# Patient Record
Sex: FEMALE | Race: WHITE | NOT HISPANIC OR LATINO | Employment: OTHER | URBAN - METROPOLITAN AREA
[De-identification: names, ages, dates, MRNs, and addresses within clinical notes are randomized per-mention and may not be internally consistent; named-entity substitution may affect disease eponyms.]

---

## 2017-12-28 ENCOUNTER — TRANSCRIBE ORDERS (OUTPATIENT)
Dept: LAB | Facility: HOSPITAL | Age: 77
End: 2017-12-28

## 2017-12-28 ENCOUNTER — LAB (OUTPATIENT)
Dept: LAB | Facility: HOSPITAL | Age: 77
End: 2017-12-28

## 2017-12-28 DIAGNOSIS — Z01.812 PRE-OPERATIVE LABORATORY EXAMINATION: Primary | ICD-10-CM

## 2017-12-28 DIAGNOSIS — Z01.812 PRE-OPERATIVE LABORATORY EXAMINATION: ICD-10-CM

## 2017-12-28 LAB
ANION GAP SERPL CALCULATED.3IONS-SCNC: 9 MMOL/L (ref 3–11)
BUN BLD-MCNC: 20 MG/DL (ref 9–23)
BUN/CREAT SERPL: 28.6 (ref 7–25)
CALCIUM SPEC-SCNC: 9 MG/DL (ref 8.7–10.4)
CHLORIDE SERPL-SCNC: 109 MMOL/L (ref 99–109)
CO2 SERPL-SCNC: 23 MMOL/L (ref 20–31)
CREAT BLD-MCNC: 0.7 MG/DL (ref 0.6–1.3)
DEPRECATED RDW RBC AUTO: 44.9 FL (ref 37–54)
ERYTHROCYTE [DISTWIDTH] IN BLOOD BY AUTOMATED COUNT: 13.3 % (ref 11.3–14.5)
GFR SERPL CREATININE-BSD FRML MDRD: 81 ML/MIN/1.73
GLUCOSE BLD-MCNC: 121 MG/DL (ref 70–100)
HCT VFR BLD AUTO: 42.3 % (ref 34.5–44)
HGB BLD-MCNC: 14 G/DL (ref 11.5–15.5)
MCH RBC QN AUTO: 30.6 PG (ref 27–31)
MCHC RBC AUTO-ENTMCNC: 33.1 G/DL (ref 32–36)
MCV RBC AUTO: 92.6 FL (ref 80–99)
PLATELET # BLD AUTO: 370 10*3/MM3 (ref 150–450)
PMV BLD AUTO: 10.8 FL (ref 6–12)
POTASSIUM BLD-SCNC: 4 MMOL/L (ref 3.5–5.5)
RBC # BLD AUTO: 4.57 10*6/MM3 (ref 3.89–5.14)
SODIUM BLD-SCNC: 141 MMOL/L (ref 132–146)
WBC NRBC COR # BLD: 8.77 10*3/MM3 (ref 3.5–10.8)

## 2017-12-28 PROCEDURE — 80048 BASIC METABOLIC PNL TOTAL CA: CPT

## 2017-12-28 PROCEDURE — 36415 COLL VENOUS BLD VENIPUNCTURE: CPT | Performed by: SURGERY

## 2017-12-28 PROCEDURE — 85027 COMPLETE CBC AUTOMATED: CPT | Performed by: SURGERY

## 2018-09-06 ENCOUNTER — TRANSCRIBE ORDERS (OUTPATIENT)
Dept: ADMINISTRATIVE | Facility: HOSPITAL | Age: 78
End: 2018-09-06

## 2018-09-06 DIAGNOSIS — Z12.31 VISIT FOR SCREENING MAMMOGRAM: Primary | ICD-10-CM

## 2018-10-11 ENCOUNTER — HOSPITAL ENCOUNTER (OUTPATIENT)
Dept: MAMMOGRAPHY | Facility: HOSPITAL | Age: 78
Discharge: HOME | End: 2018-10-11
Attending: INTERNAL MEDICINE | Admitting: INTERNAL MEDICINE

## 2018-10-11 DIAGNOSIS — Z12.31 VISIT FOR SCREENING MAMMOGRAM: ICD-10-CM

## 2018-10-11 PROCEDURE — 77067 SCR MAMMO BI INCL CAD: CPT

## 2018-10-11 PROCEDURE — 77063 BREAST TOMOSYNTHESIS BI: CPT

## 2018-10-11 PROCEDURE — 77067 SCR MAMMO BI INCL CAD: CPT | Performed by: RADIOLOGY

## 2018-10-11 PROCEDURE — 77063 BREAST TOMOSYNTHESIS BI: CPT | Performed by: RADIOLOGY

## 2018-10-25 ENCOUNTER — TRANSCRIBE ORDERS (OUTPATIENT)
Dept: ADMINISTRATIVE | Facility: HOSPITAL | Age: 78
End: 2018-10-25

## 2018-10-25 DIAGNOSIS — R29.898 RIGHT ARM WEAKNESS: Primary | ICD-10-CM

## 2018-10-29 ENCOUNTER — HOSPITAL ENCOUNTER (OUTPATIENT)
Dept: CT IMAGING | Facility: HOSPITAL | Age: 78
Discharge: HOME | End: 2018-10-29
Attending: INTERNAL MEDICINE | Admitting: INTERNAL MEDICINE

## 2018-10-29 DIAGNOSIS — R29.898 RIGHT ARM WEAKNESS: ICD-10-CM

## 2018-10-29 PROCEDURE — 72125 CT NECK SPINE W/O DYE: CPT

## 2018-11-28 RX ORDER — SULFAMETHOXAZOLE AND TRIMETHOPRIM 800; 160 MG/1; MG/1
TABLET ORAL EVERY 12 HOURS
COMMUNITY
Start: 2018-10-24 | End: 2018-11-30

## 2018-11-28 RX ORDER — MULTIVIT-MIN/IRON/FOLIC ACID/K 18-600-40
2000 CAPSULE ORAL DAILY
COMMUNITY
Start: 2009-11-11 | End: 2021-09-27

## 2018-11-28 RX ORDER — METRONIDAZOLE 7.5 MG/G
LOTION TOPICAL EVERY 12 HOURS SCHEDULED
COMMUNITY
Start: 2017-05-18 | End: 2018-11-30

## 2018-11-28 RX ORDER — SENNOSIDES 8.6 MG
650 CAPSULE ORAL EVERY 12 HOURS PRN
COMMUNITY
Start: 2016-08-29

## 2018-11-28 RX ORDER — HYDROCHLOROTHIAZIDE 12.5 MG/1
12.5 TABLET ORAL DAILY
COMMUNITY
Start: 2018-05-14 | End: 2019-05-13 | Stop reason: SDUPTHER

## 2018-11-28 RX ORDER — MULTIVIT-MIN/IRON/FOLIC/HRB186 3.3 MG-25
1 TABLET ORAL 2 TIMES DAILY
COMMUNITY
Start: 2015-05-13 | End: 2019-11-01

## 2018-11-28 RX ORDER — LOSARTAN POTASSIUM 25 MG/1
25 TABLET ORAL DAILY
COMMUNITY
Start: 2018-05-14 | End: 2019-05-13 | Stop reason: SDUPTHER

## 2018-11-30 ENCOUNTER — OFFICE VISIT (OUTPATIENT)
Dept: NEUROSURGERY | Facility: CLINIC | Age: 78
End: 2018-11-30

## 2018-11-30 VITALS — BODY MASS INDEX: 31.36 KG/M2 | TEMPERATURE: 98.5 F | WEIGHT: 177 LBS | HEIGHT: 63 IN

## 2018-11-30 DIAGNOSIS — G56.03 BILATERAL CARPAL TUNNEL SYNDROME: Primary | ICD-10-CM

## 2018-11-30 DIAGNOSIS — M47.812 CERVICAL SPONDYLOSIS WITHOUT MYELOPATHY: ICD-10-CM

## 2018-11-30 DIAGNOSIS — M25.511 PAIN IN JOINT OF RIGHT SHOULDER: ICD-10-CM

## 2018-11-30 PROCEDURE — 99203 OFFICE O/P NEW LOW 30 MIN: CPT | Performed by: NEUROLOGICAL SURGERY

## 2018-11-30 NOTE — PROGRESS NOTES
Patient: Heidi Youssef  : 1940    Primary Care Provider: Kyle Piedra MD    Requesting Provider: As above        History    Chief Complaint: Bilateral hand numbness.    History of Present Illness: Ms. Youssef is a 78-year-old retired woman who for 6 months has had progressive numbness involving her hands.  In  she underwent right carpal tunnel release by Dr. Hough.  Even after surgery she continued to have some numbness in her fingers but that seems to have increased.  She is also now having symptoms on the left side.  She is right-handed.  Her symptoms bother her more at night.  She's also had some biceps region pain on the right that has settled down since she's backed off of some physical activities at home.  In the past she's had right rotator cuff surgery.  She has some low-grade neck pain.  She describes no radicular symptoms.    Review of Systems   Constitutional: Negative for activity change, appetite change, chills, diaphoresis, fatigue, fever and unexpected weight change.   HENT: Negative for congestion, dental problem, drooling, ear discharge, ear pain, facial swelling, hearing loss, mouth sores, nosebleeds, postnasal drip, rhinorrhea, sinus pressure, sneezing, sore throat, tinnitus, trouble swallowing and voice change.    Eyes: Negative for photophobia, pain, discharge, redness, itching and visual disturbance.   Respiratory: Negative for apnea, cough, choking, chest tightness, shortness of breath, wheezing and stridor.    Cardiovascular: Negative for chest pain, palpitations and leg swelling.   Gastrointestinal: Negative for abdominal distention, abdominal pain, anal bleeding, blood in stool, constipation, diarrhea, nausea, rectal pain and vomiting.   Musculoskeletal: Positive for neck pain. Negative for arthralgias, back pain, gait problem, joint swelling, myalgias and neck stiffness.   Skin: Negative for color change, pallor, rash and wound.   Allergic/Immunologic: Negative for  "environmental allergies, food allergies and immunocompromised state.   Neurological: Positive for weakness and numbness. Negative for dizziness, tremors, seizures, syncope, facial asymmetry, speech difficulty, light-headedness and headaches.   Hematological: Negative for adenopathy. Does not bruise/bleed easily.   Psychiatric/Behavioral: Positive for sleep disturbance. Negative for agitation, behavioral problems, confusion, decreased concentration, dysphoric mood, self-injury and suicidal ideas. The patient is not nervous/anxious and is not hyperactive.        The patient's past medical history, past surgical history, family history, and social history have been reviewed at length in the electronic medical record.    Physical Exam:   Temp 98.5 °F (36.9 °C)   Ht 160 cm (63\")   Wt 80.3 kg (177 lb)   BMI 31.35 kg/m²   CONSTITUTIONAL: Patient is well-nourished, pleasant and appears stated age.  CV: Heart regular rate and rhythm without murmur, rub, or gallop.  PULMONARY: Lungs are clear to ascultation.  MUSCULOSKELETAL:  Neck tenderness to palpation is not observed.   ROM in neck is normal.  Ranging the right shoulder induces discomfort.  She is uncomfortable to palpation in the right bicep region.  Tinel sign is positive bilaterally.  NEUROLOGICAL:  Orientation, memory, attention span, language function, and cognition have been examined and are intact.  Strength is intact in the upper and lower extremities to direct testing.  Muscle tone is normal throughout.  Station and gait are normal.  Sensation is intact to light touch testing throughout.  Deep tendon reflexes are 1+ and symmetrical.  Yareli's Sign is negative bilaterally. No clonus is elicited.  Coordination is intact.      Medical Decision Making    Data Review:   CT of the cervical spine demonstrates some mild diffuse spondylosis.  Electrodiagnostic studies demonstrate moderate median neuropathy at the wrists, worse on the left    Diagnosis:   1.  " Bilateral carpal tunnel syndrome, worse on the left, recurrent on the right.  2.  Cervical spondylosis with mild mechanical neck pain.    Treatment Options:   I have prescribed wrist splints that I like her to wear at night.  If this is not helpful then I would probably consider staged carpal tunnel release beginning on the left.  Once again she's already had intervention done at the right wrist.  She is leaving for [place] very soon and will be back until April.  I will be available.       Diagnosis Plan   1. Bilateral carpal tunnel syndrome   Wrist Hand Orthosis, Wrist Extension Control Cock-up   2. Cervical spondylosis without myelopathy     3. Pain in joint of right shoulder             I, Dr. Wu, personally performed the services described in the documentation, as scribed in my presence, and it is both accurate and complete.  Scribed for Woo Wu MD by Carter Villafana CMA on 11/30/2018 at 2:51 PM

## 2019-05-01 ENCOUNTER — LAB (OUTPATIENT)
Dept: LAB | Facility: HOSPITAL | Age: 79
End: 2019-05-01

## 2019-05-01 ENCOUNTER — OFFICE VISIT (OUTPATIENT)
Dept: INTERNAL MEDICINE | Facility: CLINIC | Age: 79
End: 2019-05-01

## 2019-05-01 VITALS
DIASTOLIC BLOOD PRESSURE: 78 MMHG | WEIGHT: 169 LBS | HEART RATE: 72 BPM | SYSTOLIC BLOOD PRESSURE: 132 MMHG | BODY MASS INDEX: 29.94 KG/M2 | TEMPERATURE: 97.4 F

## 2019-05-01 DIAGNOSIS — R19.5 DARK STOOLS: ICD-10-CM

## 2019-05-01 DIAGNOSIS — R82.90 ABNORMAL FINDING IN URINE: Primary | ICD-10-CM

## 2019-05-01 LAB
BASOPHILS # BLD AUTO: 0.09 10*3/MM3 (ref 0–0.2)
BASOPHILS NFR BLD AUTO: 1.2 % (ref 0–1.5)
BILIRUB BLD-MCNC: NEGATIVE MG/DL
CLARITY, POC: CLEAR
COLOR UR: YELLOW
DEPRECATED RDW RBC AUTO: 43.7 FL (ref 37–54)
EOSINOPHIL # BLD AUTO: 0.07 10*3/MM3 (ref 0–0.4)
EOSINOPHIL NFR BLD AUTO: 0.9 % (ref 0.3–6.2)
ERYTHROCYTE [DISTWIDTH] IN BLOOD BY AUTOMATED COUNT: 12.6 % (ref 12.3–15.4)
GLUCOSE UR STRIP-MCNC: NEGATIVE MG/DL
HCT VFR BLD AUTO: 43 % (ref 34–46.6)
HGB BLD-MCNC: 13.8 G/DL (ref 12–15.9)
IMM GRANULOCYTES # BLD AUTO: 0.03 10*3/MM3 (ref 0–0.05)
IMM GRANULOCYTES NFR BLD AUTO: 0.4 % (ref 0–0.5)
KETONES UR QL: NEGATIVE
LEUKOCYTE EST, POC: NEGATIVE
LYMPHOCYTES # BLD AUTO: 3.03 10*3/MM3 (ref 0.7–3.1)
LYMPHOCYTES NFR BLD AUTO: 40 % (ref 19.6–45.3)
MCH RBC QN AUTO: 30.3 PG (ref 26.6–33)
MCHC RBC AUTO-ENTMCNC: 32.1 G/DL (ref 31.5–35.7)
MCV RBC AUTO: 94.3 FL (ref 79–97)
MONOCYTES # BLD AUTO: 0.59 10*3/MM3 (ref 0.1–0.9)
MONOCYTES NFR BLD AUTO: 7.8 % (ref 5–12)
NEUTROPHILS # BLD AUTO: 3.76 10*3/MM3 (ref 1.7–7)
NEUTROPHILS NFR BLD AUTO: 49.7 % (ref 42.7–76)
NITRITE UR-MCNC: NEGATIVE MG/ML
NRBC BLD AUTO-RTO: 0.1 /100 WBC (ref 0–0.2)
PH UR: 5.5 [PH] (ref 5–8)
PLATELET # BLD AUTO: 446 10*3/MM3 (ref 140–450)
PMV BLD AUTO: 10 FL (ref 6–12)
PROT UR STRIP-MCNC: NEGATIVE MG/DL
RBC # BLD AUTO: 4.56 10*6/MM3 (ref 3.77–5.28)
RBC # UR STRIP: NEGATIVE /UL
SP GR UR: 1.02 (ref 1–1.03)
UROBILINOGEN UR QL: NORMAL
WBC NRBC COR # BLD: 7.57 10*3/MM3 (ref 3.4–10.8)

## 2019-05-01 PROCEDURE — 85025 COMPLETE CBC W/AUTO DIFF WBC: CPT

## 2019-05-01 PROCEDURE — 81003 URINALYSIS AUTO W/O SCOPE: CPT | Performed by: PHYSICIAN ASSISTANT

## 2019-05-01 PROCEDURE — 99214 OFFICE O/P EST MOD 30 MIN: CPT | Performed by: PHYSICIAN ASSISTANT

## 2019-05-01 PROCEDURE — 36415 COLL VENOUS BLD VENIPUNCTURE: CPT

## 2019-05-01 NOTE — PATIENT INSTRUCTIONS
Increase water consumption.  Call if dark urine persists.    CBC and FOBT ordered today.  She is to call if the dark stool persist.

## 2019-05-01 NOTE — PROGRESS NOTES
Patient Care Team:  Kyle Piedra MD as PCP - General  Kyle Piedra MD as PCP - Family Medicine  Kyle Piedra MD as Referring Physician (Internal Medicine)    Chief Complaint;:   Chief Complaint   Patient presents with   • Urinary Tract Infection     dark urine, odor x 1 week         Subjective     HPI  78-year-old white female presents to the office today complaining of odorous urine and dark color.  She does drink a lot of water.  She has had no frequency or dysuria.  UA in the office today was normal.  She denies any new vitamins or supplements.    She did mention over the last several weeks she has had a couple of episodes of dark stool.  No epigastric pain.  She will occasionally have bright red blood due to her hemorrhoids.  Past Medical History:   Diagnosis Date   • Arthritis    • Breast injury    • Hyperlipidemia    • Hypertension        Social History     Socioeconomic History   • Marital status:      Spouse name: Not on file   • Number of children: Not on file   • Years of education: Not on file   • Highest education level: Not on file   Tobacco Use   • Smoking status: Never Smoker   • Smokeless tobacco: Never Used   Substance and Sexual Activity   • Alcohol use: Yes     Comment: occasional   • Drug use: No       Allergies   Allergen Reactions   • Bactrim [Sulfamethoxazole-Trimethoprim] Other (See Comments)     Severe joint pain   • Aspirin Palpitations   • Nsaids Palpitations       Review of Systems:     Review of Systems   Constitutional: Negative.    Gastrointestinal: Positive for blood in stool. Negative for abdominal pain, constipation, diarrhea, nausea, rectal pain, vomiting, GERD and indigestion.   Genitourinary: Negative for dysuria, flank pain, frequency and urgency.       Vital Signs  Vitals:    05/01/19 1530   BP: 132/78   BP Location: Left arm   Patient Position: Sitting   Cuff Size: Adult   Pulse: 72   Temp: 97.4 °F (36.3 °C)   TempSrc: Temporal   Weight:  76.7 kg (169 lb)   PainSc: 0-No pain         Current Outpatient Medications:   •  acetaminophen (TYLENOL) 650 MG 8 hr tablet, Take 650 mg by mouth Every 12 (Twelve) Hours As Needed for Moderate Pain ., Disp: , Rfl:   •  Cholecalciferol (VITAMIN D) 2000 units capsule, Take 2,000 Units by mouth Daily., Disp: , Rfl:   •  Glucosamine-Chondroitin 250-200 MG capsule, Take 1 capsule by mouth 2 (Two) Times a Day., Disp: , Rfl:   •  hydrochlorothiazide (HYDRODIURIL) 12.5 MG tablet, Take 12.5 mg by mouth Daily., Disp: , Rfl:   •  losartan (COZAAR) 25 MG tablet, Take 25 mg by mouth Daily., Disp: , Rfl:     Physical Exam:    Physical Exam   Constitutional: She is oriented to person, place, and time. She appears well-developed and well-nourished.   Neck: Normal range of motion. Neck supple.   Cardiovascular: Normal rate, regular rhythm and normal heart sounds.   Pulmonary/Chest: Effort normal and breath sounds normal.   Abdominal: Soft. Bowel sounds are normal. She exhibits no distension and no mass. There is no tenderness. There is no guarding.   Neurological: She is alert and oriented to person, place, and time.   Psychiatric: She has a normal mood and affect. Her behavior is normal. Judgment and thought content normal.   Nursing note and vitals reviewed.      Procedures      Assessment/Plan   Problem List Items Addressed This Visit     None      Visit Diagnoses     Abnormal finding in urine    -  Primary    She was encouraged to drink more water    Relevant Orders    POC Urinalysis Dipstick, Automated (Completed)    Dark stools        Relevant Orders    CBC w AUTO Differential    POCT Occult blood x 3, stool        Patient Instructions   Increase water consumption.  Call if dark urine persists.    CBC and FOBT ordered today.  She is to call if the dark stool persist.      Plan of care reviewed with patient at the conclusion of today's visit. Education was provided regarding diagnosis, management, and any prescribed or  recommended OTC medications.Patient verbalizes understanding of and agreement with management plan.     Bel Ferguson PA-C

## 2019-05-03 ENCOUNTER — LAB (OUTPATIENT)
Dept: LAB | Facility: HOSPITAL | Age: 79
End: 2019-05-03

## 2019-05-03 DIAGNOSIS — R19.5 DARK STOOLS: ICD-10-CM

## 2019-05-03 LAB — HEMOCCULT STL QL: NEGATIVE

## 2019-05-03 PROCEDURE — 82270 OCCULT BLOOD FECES: CPT

## 2019-05-13 RX ORDER — LOSARTAN POTASSIUM 25 MG/1
25 TABLET ORAL DAILY
Qty: 30 TABLET | Refills: 11 | Status: SHIPPED | OUTPATIENT
Start: 2019-05-13 | End: 2020-05-11

## 2019-05-13 RX ORDER — HYDROCHLOROTHIAZIDE 12.5 MG/1
TABLET ORAL
Qty: 15 TABLET | Refills: 11 | Status: SHIPPED | OUTPATIENT
Start: 2019-05-13 | End: 2020-05-11

## 2019-05-22 PROBLEM — L72.3 SEBACEOUS CYST: Status: ACTIVE | Noted: 2019-05-22

## 2019-05-22 PROBLEM — Z78.9 ALCOHOL USE: Status: ACTIVE | Noted: 2019-05-22

## 2019-05-22 PROBLEM — E78.2 MIXED HYPERLIPIDEMIA: Status: ACTIVE | Noted: 2019-05-22

## 2019-05-22 PROBLEM — M19.90 OSTEOARTHRITIS: Status: ACTIVE | Noted: 2019-05-22

## 2019-05-22 PROBLEM — R20.2 PARESTHESIAS IN LEFT HAND: Status: ACTIVE | Noted: 2019-05-22

## 2019-05-22 PROBLEM — R20.2 PARESTHESIAS IN RIGHT HAND: Status: ACTIVE | Noted: 2019-05-22

## 2019-05-22 PROBLEM — N39.0 UTI (URINARY TRACT INFECTION): Status: ACTIVE | Noted: 2019-05-22

## 2019-05-22 PROBLEM — I10 BENIGN ESSENTIAL HYPERTENSION: Status: ACTIVE | Noted: 2019-05-22

## 2019-05-22 PROBLEM — F10.90 ALCOHOL USE: Status: ACTIVE | Noted: 2019-05-22

## 2019-05-24 ENCOUNTER — OFFICE VISIT (OUTPATIENT)
Dept: INTERNAL MEDICINE | Facility: CLINIC | Age: 79
End: 2019-05-24

## 2019-05-24 ENCOUNTER — LAB (OUTPATIENT)
Dept: LAB | Facility: HOSPITAL | Age: 79
End: 2019-05-24

## 2019-05-24 VITALS
SYSTOLIC BLOOD PRESSURE: 118 MMHG | BODY MASS INDEX: 30.12 KG/M2 | HEIGHT: 63 IN | HEART RATE: 80 BPM | WEIGHT: 170 LBS | TEMPERATURE: 100.1 F | DIASTOLIC BLOOD PRESSURE: 62 MMHG

## 2019-05-24 DIAGNOSIS — N39.0 ACUTE UTI: ICD-10-CM

## 2019-05-24 DIAGNOSIS — R82.998 DARK URINE: Primary | ICD-10-CM

## 2019-05-24 LAB
BILIRUB BLD-MCNC: ABNORMAL MG/DL
CLARITY, POC: ABNORMAL
COLOR UR: ABNORMAL
GLUCOSE UR STRIP-MCNC: NEGATIVE MG/DL
KETONES UR QL: ABNORMAL
LEUKOCYTE EST, POC: ABNORMAL
NITRITE UR-MCNC: POSITIVE MG/ML
PH UR: 5.5 [PH] (ref 5–8)
PROT UR STRIP-MCNC: ABNORMAL MG/DL
RBC # UR STRIP: ABNORMAL /UL
SP GR UR: 1.02 (ref 1–1.03)
UROBILINOGEN UR QL: NORMAL

## 2019-05-24 PROCEDURE — 87186 SC STD MICRODIL/AGAR DIL: CPT

## 2019-05-24 PROCEDURE — 87086 URINE CULTURE/COLONY COUNT: CPT

## 2019-05-24 PROCEDURE — 99213 OFFICE O/P EST LOW 20 MIN: CPT | Performed by: NURSE PRACTITIONER

## 2019-05-24 PROCEDURE — 81003 URINALYSIS AUTO W/O SCOPE: CPT | Performed by: NURSE PRACTITIONER

## 2019-05-24 RX ORDER — CIPROFLOXACIN 500 MG/1
500 TABLET, FILM COATED ORAL 2 TIMES DAILY
Qty: 10 TABLET | Refills: 0 | Status: SHIPPED | OUTPATIENT
Start: 2019-05-24 | End: 2019-09-09

## 2019-05-24 RX ORDER — ONDANSETRON 4 MG/1
4 TABLET, FILM COATED ORAL EVERY 8 HOURS PRN
Qty: 6 TABLET | Refills: 0 | Status: SHIPPED | OUTPATIENT
Start: 2019-05-24 | End: 2019-09-30

## 2019-05-24 NOTE — PROGRESS NOTES
Heidi Youssef  1940  [id number]  Patient Care Team:  Kyle Piedra MD as PCP - General  Kyle Piedra MD as PCP - Family Medicine  Kyle Piedra MD as Referring Physician (Internal Medicine)    Heidi Youssef is a pleasant 78 y.o. female who presents for evaluation of Chills and Nausea    Chief Complaint   Patient presents with   • Chills   • Nausea       HPI:   5 days week history of low back pain, chills, nausea, urinary symptoms.  Was here on May 1 and had negative UA.  She has been traveling.  No abdominal pain.  Dark urine, no hematuria.  Drinking extra water.  Pain developed while on 7-hour flight home from [place] Monday.  That has already resolved.    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       Past Surgical History:   Procedure Laterality Date   • CARPAL TUNNEL RELEASE  12/16/2015    Right   • JOINT REPLACEMENT      rt knee partial replacement   • REPLACEMENT TOTAL KNEE  2014   • TONSILLECTOMY  1952   • TUBAL ABDOMINAL LIGATION Bilateral 1975       Family History   Problem Relation Age of Onset   • Breast cancer Maternal Aunt 45   • Arthritis Mother         OA   • Hypertension Mother    • Diabetes Mother    • Coronary artery disease Mother    • Coronary artery disease Father    • Diabetes Maternal Uncle        Social History     Tobacco Use   Smoking Status Never Smoker   Smokeless Tobacco Never Used       Allergies   Allergen Reactions   • Bactrim [Sulfamethoxazole-Trimethoprim] Other (See Comments)     Severe joint pain   • Aspirin Palpitations   • Nsaids Palpitations       Review of Systems   Constitutional: Positive for chills, fatigue and fever.   HENT: Negative for congestion, ear pain and sinus pressure.    Respiratory: Negative for cough, chest tightness, shortness of breath and wheezing.    Cardiovascular: Negative for chest pain and  "palpitations.   Gastrointestinal: Negative for abdominal pain, blood in stool and constipation.   Musculoskeletal: Positive for back pain.   Skin: Negative for color change.   Allergic/Immunologic: Negative for environmental allergies.   Neurological: Negative for dizziness, speech difficulty and headache.   Psychiatric/Behavioral: Negative for decreased concentration. The patient is not nervous/anxious.        Vitals:    05/24/19 1309   BP: 118/62   BP Location: Left arm   Patient Position: Sitting   Cuff Size: Adult   Pulse: 80   Temp: 100.1 °F (37.8 °C)   TempSrc: Temporal   Weight: 77.1 kg (170 lb)   Height: 160 cm (63\")         Current Outpatient Medications:   •  acetaminophen (TYLENOL) 650 MG 8 hr tablet, Take 650 mg by mouth Every 12 (Twelve) Hours As Needed for Moderate Pain ., Disp: , Rfl:   •  Cholecalciferol (VITAMIN D) 2000 units capsule, Take 2,000 Units by mouth Daily., Disp: , Rfl:   •  Glucosamine-Chondroitin 250-200 MG capsule, Take 1 capsule by mouth 2 (Two) Times a Day., Disp: , Rfl:   •  hydrochlorothiazide (HYDRODIURIL) 12.5 MG tablet, TAKE 1/2 TABLET QD, Disp: 15 tablet, Rfl: 11  •  losartan (COZAAR) 25 MG tablet, Take 1 tablet by mouth Daily., Disp: 30 tablet, Rfl: 11  •  ciprofloxacin (CIPRO) 500 MG tablet, Take 1 tablet by mouth 2 (Two) Times a Day., Disp: 10 tablet, Rfl: 0  •  ondansetron (ZOFRAN) 4 MG tablet, Take 1 tablet by mouth Every 8 (Eight) Hours As Needed for Nausea or Vomiting., Disp: 6 tablet, Rfl: 0    Physical Exam   Constitutional: She appears well-developed and well-nourished.   HENT:   Head: Normocephalic and atraumatic.   Right Ear: External ear normal.   Left Ear: External ear normal.   Mouth/Throat: Oropharynx is clear and moist.   Eyes: Conjunctivae and EOM are normal.   Neck: Normal range of motion. Neck supple.   Cardiovascular: Normal rate, regular rhythm and normal heart sounds.   Pulmonary/Chest: Effort normal and breath sounds normal.   Abdominal: Soft. Bowel " sounds are normal. There is tenderness in the suprapubic area.   Musculoskeletal: Normal range of motion.   Lymphadenopathy:     She has no cervical adenopathy.   Neurological: She is alert.   Skin: Skin is warm and dry.   Psychiatric: She has a normal mood and affect. Her behavior is normal. Thought content normal.       Results Review:    I reviewed the patient's new clinical results.    Assessment/Plan:    Problem List Items Addressed This Visit     None      Visit Diagnoses     Dark urine    -  Primary    Relevant Orders    POC Urinalysis Dipstick, Automated (Completed)    Acute UTI        Relevant Medications    ciprofloxacin (CIPRO) 500 MG tablet    Other Relevant Orders    Urine Culture - Urine, Urine, Clean Catch          Plan of care reviewed with patient at the conclusion of today's visit. Education was provided regarding diagnosis, management and any prescribed or recommended OTC medications.  Patient verbalizes understanding of and agreement with management plan.    Return if symptoms worsen or fail to improve.    *Note that portions of this note were completed with a voice recognition program.  Efforts were made to edit the dictation but occasionally words are transcribed.    DELORES Andersen

## 2019-05-26 LAB — BACTERIA SPEC AEROBE CULT: ABNORMAL

## 2019-05-27 ENCOUNTER — TELEPHONE (OUTPATIENT)
Dept: INTERNAL MEDICINE | Facility: CLINIC | Age: 79
End: 2019-05-27

## 2019-08-26 PROBLEM — E66.9 OBESITY: Status: ACTIVE | Noted: 2019-08-26

## 2019-08-26 PROBLEM — Z00.00 ANNUAL PHYSICAL EXAM: Status: ACTIVE | Noted: 2019-08-26

## 2019-09-09 ENCOUNTER — LAB (OUTPATIENT)
Dept: LAB | Facility: HOSPITAL | Age: 79
End: 2019-09-09

## 2019-09-09 ENCOUNTER — OFFICE VISIT (OUTPATIENT)
Dept: INTERNAL MEDICINE | Facility: CLINIC | Age: 79
End: 2019-09-09

## 2019-09-09 VITALS
HEART RATE: 72 BPM | BODY MASS INDEX: 30.48 KG/M2 | HEIGHT: 63 IN | SYSTOLIC BLOOD PRESSURE: 144 MMHG | DIASTOLIC BLOOD PRESSURE: 78 MMHG | WEIGHT: 172 LBS

## 2019-09-09 DIAGNOSIS — Z00.00 WELL ADULT EXAM: Primary | ICD-10-CM

## 2019-09-09 DIAGNOSIS — Z00.00 WELL ADULT EXAM: ICD-10-CM

## 2019-09-09 DIAGNOSIS — E78.2 MIXED HYPERLIPIDEMIA: ICD-10-CM

## 2019-09-09 DIAGNOSIS — I10 BENIGN ESSENTIAL HYPERTENSION: ICD-10-CM

## 2019-09-09 LAB
ALBUMIN SERPL-MCNC: 4.3 G/DL (ref 3.5–5.2)
ALBUMIN UR-MCNC: 4 MG/DL
ALBUMIN/GLOB SERPL: 1.4 G/DL
ALP SERPL-CCNC: 66 U/L (ref 39–117)
ALT SERPL W P-5'-P-CCNC: 15 U/L (ref 1–33)
ANION GAP SERPL CALCULATED.3IONS-SCNC: 11.5 MMOL/L (ref 5–15)
AST SERPL-CCNC: 16 U/L (ref 1–32)
BASOPHILS # BLD AUTO: 0.08 10*3/MM3 (ref 0–0.2)
BASOPHILS NFR BLD AUTO: 1.3 % (ref 0–1.5)
BILIRUB SERPL-MCNC: 0.4 MG/DL (ref 0.2–1.2)
BUN BLD-MCNC: 22 MG/DL (ref 8–23)
BUN/CREAT SERPL: 31.9 (ref 7–25)
CALCIUM SPEC-SCNC: 9.3 MG/DL (ref 8.6–10.5)
CHLORIDE SERPL-SCNC: 108 MMOL/L (ref 98–107)
CHOLEST SERPL-MCNC: 175 MG/DL (ref 0–200)
CO2 SERPL-SCNC: 20.5 MMOL/L (ref 22–29)
CREAT BLD-MCNC: 0.69 MG/DL (ref 0.57–1)
DEPRECATED RDW RBC AUTO: 45.1 FL (ref 37–54)
EOSINOPHIL # BLD AUTO: 0.13 10*3/MM3 (ref 0–0.4)
EOSINOPHIL NFR BLD AUTO: 2.1 % (ref 0.3–6.2)
ERYTHROCYTE [DISTWIDTH] IN BLOOD BY AUTOMATED COUNT: 13.2 % (ref 12.3–15.4)
GFR SERPL CREATININE-BSD FRML MDRD: 82 ML/MIN/1.73
GLOBULIN UR ELPH-MCNC: 3 GM/DL
GLUCOSE BLD-MCNC: 94 MG/DL (ref 65–99)
HCT VFR BLD AUTO: 43.1 % (ref 34–46.6)
HDLC SERPL-MCNC: 38 MG/DL (ref 40–60)
HGB BLD-MCNC: 13.9 G/DL (ref 12–15.9)
IMM GRANULOCYTES # BLD AUTO: 0.01 10*3/MM3 (ref 0–0.05)
IMM GRANULOCYTES NFR BLD AUTO: 0.2 % (ref 0–0.5)
LDLC SERPL CALC-MCNC: 111 MG/DL (ref 0–100)
LDLC/HDLC SERPL: 2.93 {RATIO}
LYMPHOCYTES # BLD AUTO: 2.39 10*3/MM3 (ref 0.7–3.1)
LYMPHOCYTES NFR BLD AUTO: 38.4 % (ref 19.6–45.3)
MCH RBC QN AUTO: 30.3 PG (ref 26.6–33)
MCHC RBC AUTO-ENTMCNC: 32.3 G/DL (ref 31.5–35.7)
MCV RBC AUTO: 94.1 FL (ref 79–97)
MONOCYTES # BLD AUTO: 0.51 10*3/MM3 (ref 0.1–0.9)
MONOCYTES NFR BLD AUTO: 8.2 % (ref 5–12)
NEUTROPHILS # BLD AUTO: 3.11 10*3/MM3 (ref 1.7–7)
NEUTROPHILS NFR BLD AUTO: 49.8 % (ref 42.7–76)
NRBC BLD AUTO-RTO: 0 /100 WBC (ref 0–0.2)
PLATELET # BLD AUTO: 357 10*3/MM3 (ref 140–450)
PMV BLD AUTO: 10.1 FL (ref 6–12)
POTASSIUM BLD-SCNC: 4 MMOL/L (ref 3.5–5.2)
PROT SERPL-MCNC: 7.3 G/DL (ref 6–8.5)
RBC # BLD AUTO: 4.58 10*6/MM3 (ref 3.77–5.28)
SODIUM BLD-SCNC: 140 MMOL/L (ref 136–145)
TRIGL SERPL-MCNC: 129 MG/DL (ref 0–150)
VLDLC SERPL-MCNC: 25.8 MG/DL (ref 5–40)
WBC NRBC COR # BLD: 6.23 10*3/MM3 (ref 3.4–10.8)

## 2019-09-09 PROCEDURE — 80061 LIPID PANEL: CPT

## 2019-09-09 PROCEDURE — 82043 UR ALBUMIN QUANTITATIVE: CPT

## 2019-09-09 PROCEDURE — G0439 PPPS, SUBSEQ VISIT: HCPCS | Performed by: PHYSICIAN ASSISTANT

## 2019-09-09 PROCEDURE — 85025 COMPLETE CBC W/AUTO DIFF WBC: CPT

## 2019-09-09 PROCEDURE — 80053 COMPREHEN METABOLIC PANEL: CPT

## 2019-09-09 NOTE — PATIENT INSTRUCTIONS
Medicare Wellness  Personal Prevention Plan of Service     Date of Office Visit:  2019  Encounter Provider:  Bel Ferguson PA-C  Place of Service:  [facility]  Patient Name: Heidi Youssef  :  1940    As part of the Medicare Wellness portion of your visit today, we are providing you with this personalized preventive plan of services (PPPS). This plan is based upon recommendations of the [place] Preventive Services Task Force (USPSTF) and the Advisory Committee on Immunization Practices (ACIP).    This lists the preventive care services that should be considered, and provides dates of when you are due. Items listed as completed are up-to-date and do not require any further intervention.    Health Maintenance   Topic Date Due   • ZOSTER VACCINE (1 of 2) 1991   • LIPID PANEL  2018   • MEDICARE ANNUAL WELLNESS  2019   • INFLUENZA VACCINE  2019   • TDAP/TD VACCINES (2 - Td) 10/25/2022   • PNEUMOCOCCAL VACCINES (65+ LOW/MEDIUM RISK)  Completed       No orders of the defined types were placed in this encounter.      Return for Next scheduled follow up, labs today.

## 2019-09-09 NOTE — PROGRESS NOTES
Subsequent Medicare Wellness Visit   The ABC's of the Annual Wellness Visit    Chief Complaint   Patient presents with   • Medicare Wellness-subsequent     She is fasting       HPI:  Heidi Youssef, -1940, is a 78 y.o. female who presents for a Subsequent Medicare Wellness Visit.    Recent Hospitalizations:  No hospitalization(s) within the last year..    Current Medical Providers:  Patient Care Team:  Kyle Piedra MD as PCP - General  Kyle Piedra MD as PCP - Family Medicine  Kyle Piedra MD as Referring Physician (Internal Medicine)    Health Habits and Functional and Cognitive Screening and Depression Screening:  Functional & Cognitive Status 2019   Do you have difficulty preparing food and eating? No   Do you have difficulty bathing yourself, getting dressed or grooming yourself? No   Do you have difficulty using the toilet? No   Do you have difficulty moving around from place to place? No   Do you have trouble with steps or getting out of a bed or a chair? No   Current Diet Well Balanced Diet   Dental Exam Up to date   Eye Exam Up to date   Exercise (times per week) 0 times per week   Current Exercise Activities Include None   Do you need help using the phone?  No   Are you deaf or do you have serious difficulty hearing?  No   Do you need help with transportation? No   Do you need help shopping? No   Do you need help preparing meals?  No   Do you need help with housework?  No   Do you need help with laundry? No   Do you need help taking your medications? No   Do you need help managing money? No   Do you ever drive or ride in a car without wearing a seat belt? No   Have you felt unusual stress, anger or loneliness in the last month? Yes   Who do you live with? Alone   If you need help, do you have trouble finding someone available to you? No   Have you been bothered in the last four weeks by sexual problems? No   Do you have difficulty concentrating, remembering or  making decisions? No       Compared to one year ago, the patient feels her physical health is the same and her mental health is the same.    Depression Screen:  PHQ-2/PHQ-9 Depression Screening 2019   Little interest or pleasure in doing things 0   Feeling down, depressed, or hopeless 0   Total Score 0       MATTHEW Fall Risk Assessment was completed, and patient is at LOW risk for falls.Assessment completed on:2019    ATTENTION  What is the year: correct  What is the month of the year: correct  What is the day of the week?: correct  What is the date?: correct  MEMORY  Repeat address three times, only score third attempt: [address] [place], [place]: 7  HOW MANY ANIMALS DID THE PATIENT NAME  Verbal Fluency -- Animal Names (0-25): 22+  CLOCK DRAWING  Clock Drawing: All Correct  MEMORY RECALL  Tell me what you remember about that name and address we were repeating at the beginnin  ACE TOTAL SCORE  Total ACE Score - <25/30 strongly suggests cognitive impairment; <21/30 almost certainly shows dementia: 30    Past Medical/Family/Social History:  The following portions of the patient's history were reviewed and updated as appropriate: allergies, current medications, past family history, past medical history, past social history and past surgical history.    Allergies   Allergen Reactions   • Bactrim [Sulfamethoxazole-Trimethoprim] Other (See Comments)     Severe joint pain   • Aspirin Palpitations   • Nsaids Palpitations         Current Outpatient Medications:   •  acetaminophen (TYLENOL) 650 MG 8 hr tablet, Take 650 mg by mouth Every 12 (Twelve) Hours As Needed for Moderate Pain ., Disp: , Rfl:   •  Cholecalciferol (VITAMIN D) 2000 units capsule, Take 2,000 Units by mouth Daily., Disp: , Rfl:   •  Glucosamine-Chondroitin 250-200 MG capsule, Take 1 capsule by mouth 2 (Two) Times a Day., Disp: , Rfl:   •  Histamine Dihydrochloride (AUSTRALIAN DREAM ARTHRITIS EX), Apply  topically every  "night at bedtime., Disp: , Rfl:   •  hydrochlorothiazide (HYDRODIURIL) 12.5 MG tablet, TAKE 1/2 TABLET QD, Disp: 15 tablet, Rfl: 11  •  losartan (COZAAR) 25 MG tablet, Take 1 tablet by mouth Daily., Disp: 30 tablet, Rfl: 11  •  ondansetron (ZOFRAN) 4 MG tablet, Take 1 tablet by mouth Every 8 (Eight) Hours As Needed for Nausea or Vomiting., Disp: 6 tablet, Rfl: 0    Aspirin use counseling: Does not need ASA (and currently is not on it)    Current medication list contains no high risk medications.  No harmful drug interactions have been identified.     Family History   Problem Relation Age of Onset   • Breast cancer Maternal Aunt 45   • Arthritis Mother         OA   • Hypertension Mother    • Diabetes Mother    • Coronary artery disease Mother    • Coronary artery disease Father    • Diabetes Maternal Uncle        Social History     Tobacco Use   • Smoking status: Never Smoker   • Smokeless tobacco: Never Used   Substance Use Topics   • Alcohol use: Yes     Frequency: 2-4 times a month     Drinks per session: 1 or 2     Binge frequency: Never     Comment: occasional       Past Surgical History:   Procedure Laterality Date   • CARPAL TUNNEL RELEASE  12/16/2015    Right   • JOINT REPLACEMENT      rt knee partial replacement   • REPLACEMENT TOTAL KNEE  2014   • TONSILLECTOMY  1952   • TUBAL ABDOMINAL LIGATION Bilateral 1975       Patient Active Problem List   Diagnosis   • Alcohol use   • Benign essential hypertension   • Paresthesias in right hand   • Paresthesias in left hand   • UTI (urinary tract infection)   • Mixed hyperlipidemia   • Sebaceous cyst   • Osteoarthritis   • Obesity   • Annual physical exam       Review of Systems    Objective     Vitals:    09/09/19 0958   BP: 144/78   BP Location: Left arm   Patient Position: Sitting   Cuff Size: Adult   Pulse: 72   Weight: 78 kg (172 lb)   Height: 160 cm (63\")   PainSc: 0-No pain       Patient's Body mass index is 30.47 kg/m². BMI is above normal parameters. " Recommendations include: exercise counseling.      No exam data present    The patient has no evidence of cognitve impairment.   ATTENTION  What is the year: correct  What is the month of the year: correct  What is the day of the week?: correct  What is the date?: correct  MEMORY  Repeat address three times, only score third attempt: [address] [place], [place]: 7  HOW MANY ANIMALS DID THE PATIENT NAME  Verbal Fluency -- Animal Names (0-25): 22+  CLOCK DRAWING  Clock Drawing: All Correct  MEMORY RECALL  Tell me what you remember about that name and address we were repeating at the beginnin  ACE TOTAL SCORE  Total ACE Score - <25/30 strongly suggests cognitive impairment; <21/30 almost certainly shows dementia: 30    Physical Exam    Recent Lab Results:          Assessment/Plan   Age-appropriate Screening Schedule:  Refer to the list below for future screening recommendations based on patient's age, sex and/or medical conditions.      Health Maintenance   Topic Date Due   • ZOSTER VACCINE (1 of 2) 1991   • LIPID PANEL  2018   • INFLUENZA VACCINE  2019   • TDAP/TD VACCINES (2 - Td) 10/25/2022   • PNEUMOCOCCAL VACCINES (65+ LOW/MEDIUM RISK)  Completed       Medicare Risks and Personalized Health Plan:  Cardiovascular risk      CMS-Preventive Services Quick Reference  Medicare Preventive Services Addressed:  Annual Wellness Visit (AWV)    Advance Care Planning:  Patient does not have an advance directive - not interested in additional information    Diagnoses and all orders for this visit:    1. Well adult exam (Primary)  Comments:  Fasting for labs.    Normal cognitive assessment ACE score 30/30.  Vaccines up to date.  Orders:  -     CBC & Differential; Future    2. Benign essential hypertension  -     CBC & Differential; Future  -     MicroAlbumin, Urine, Random - Urine, Clean Catch; Future    3. Mixed hyperlipidemia  -     Comprehensive Metabolic Panel; Future  -     Lipid  Panel; Future        An After Visit Summary and PPPS with all of these plans were given to the patient.      Follow Up:  Return for Next scheduled follow up, labs today.

## 2019-09-30 ENCOUNTER — OFFICE VISIT (OUTPATIENT)
Dept: INTERNAL MEDICINE | Facility: CLINIC | Age: 79
End: 2019-09-30

## 2019-09-30 VITALS
HEIGHT: 63 IN | BODY MASS INDEX: 29.77 KG/M2 | SYSTOLIC BLOOD PRESSURE: 142 MMHG | DIASTOLIC BLOOD PRESSURE: 84 MMHG | HEART RATE: 64 BPM | WEIGHT: 168 LBS

## 2019-09-30 DIAGNOSIS — E66.09 CLASS 1 OBESITY DUE TO EXCESS CALORIES WITHOUT SERIOUS COMORBIDITY WITH BODY MASS INDEX (BMI) OF 30.0 TO 30.9 IN ADULT: ICD-10-CM

## 2019-09-30 DIAGNOSIS — G56.03 BILATERAL CARPAL TUNNEL SYNDROME: ICD-10-CM

## 2019-09-30 DIAGNOSIS — M51.36 LUMBAR DEGENERATIVE DISC DISEASE: ICD-10-CM

## 2019-09-30 DIAGNOSIS — E78.2 MIXED HYPERLIPIDEMIA: ICD-10-CM

## 2019-09-30 DIAGNOSIS — I10 BENIGN ESSENTIAL HYPERTENSION: Primary | ICD-10-CM

## 2019-09-30 PROCEDURE — G0008 ADMIN INFLUENZA VIRUS VAC: HCPCS | Performed by: INTERNAL MEDICINE

## 2019-09-30 PROCEDURE — 99214 OFFICE O/P EST MOD 30 MIN: CPT | Performed by: INTERNAL MEDICINE

## 2019-09-30 PROCEDURE — 90653 IIV ADJUVANT VACCINE IM: CPT | Performed by: INTERNAL MEDICINE

## 2020-05-11 RX ORDER — HYDROCHLOROTHIAZIDE 12.5 MG/1
TABLET ORAL
Qty: 15 TABLET | Refills: 4 | Status: SHIPPED | OUTPATIENT
Start: 2020-05-11 | End: 2020-09-28 | Stop reason: SDUPTHER

## 2020-05-11 RX ORDER — LOSARTAN POTASSIUM 25 MG/1
25 TABLET ORAL DAILY
Qty: 30 TABLET | Refills: 4 | Status: SHIPPED | OUTPATIENT
Start: 2020-05-11 | End: 2020-09-28 | Stop reason: SDUPTHER

## 2020-09-10 ENCOUNTER — LAB (OUTPATIENT)
Dept: LAB | Facility: HOSPITAL | Age: 80
End: 2020-09-10

## 2020-09-10 ENCOUNTER — OFFICE VISIT (OUTPATIENT)
Dept: INTERNAL MEDICINE | Facility: CLINIC | Age: 80
End: 2020-09-10

## 2020-09-10 VITALS
BODY MASS INDEX: 31.08 KG/M2 | DIASTOLIC BLOOD PRESSURE: 74 MMHG | WEIGHT: 175.4 LBS | HEART RATE: 72 BPM | SYSTOLIC BLOOD PRESSURE: 138 MMHG | OXYGEN SATURATION: 98 % | HEIGHT: 63 IN | TEMPERATURE: 97.4 F

## 2020-09-10 DIAGNOSIS — Z12.31 BREAST CANCER SCREENING BY MAMMOGRAM: ICD-10-CM

## 2020-09-10 DIAGNOSIS — E55.9 VITAMIN D DEFICIENCY: ICD-10-CM

## 2020-09-10 DIAGNOSIS — I10 BENIGN ESSENTIAL HYPERTENSION: ICD-10-CM

## 2020-09-10 DIAGNOSIS — G89.29 CHRONIC BILATERAL LOW BACK PAIN WITH RIGHT-SIDED SCIATICA: ICD-10-CM

## 2020-09-10 DIAGNOSIS — M54.2 NECK PAIN: ICD-10-CM

## 2020-09-10 DIAGNOSIS — R53.83 FATIGUE, UNSPECIFIED TYPE: ICD-10-CM

## 2020-09-10 DIAGNOSIS — E78.2 MIXED HYPERLIPIDEMIA: ICD-10-CM

## 2020-09-10 DIAGNOSIS — M54.41 CHRONIC BILATERAL LOW BACK PAIN WITH RIGHT-SIDED SCIATICA: ICD-10-CM

## 2020-09-10 DIAGNOSIS — R20.2 PARESTHESIA: ICD-10-CM

## 2020-09-10 DIAGNOSIS — Z00.00 MEDICARE ANNUAL WELLNESS VISIT, SUBSEQUENT: Primary | ICD-10-CM

## 2020-09-10 LAB
25(OH)D3 SERPL-MCNC: 49.9 NG/ML (ref 30–100)
ALBUMIN SERPL-MCNC: 4.4 G/DL (ref 3.5–5.2)
ALBUMIN UR-MCNC: 8.3 MG/DL
ALBUMIN/GLOB SERPL: 1.5 G/DL
ALP SERPL-CCNC: 72 U/L (ref 39–117)
ALT SERPL W P-5'-P-CCNC: 15 U/L (ref 1–33)
ANION GAP SERPL CALCULATED.3IONS-SCNC: 10.7 MMOL/L (ref 5–15)
AST SERPL-CCNC: 19 U/L (ref 1–32)
BASOPHILS # BLD AUTO: 0.1 10*3/MM3 (ref 0–0.2)
BASOPHILS NFR BLD AUTO: 1.5 % (ref 0–1.5)
BILIRUB SERPL-MCNC: 0.6 MG/DL (ref 0–1.2)
BUN SERPL-MCNC: 17 MG/DL (ref 8–23)
BUN/CREAT SERPL: 30.4 (ref 7–25)
CALCIUM SPEC-SCNC: 9.2 MG/DL (ref 8.6–10.5)
CHLORIDE SERPL-SCNC: 108 MMOL/L (ref 98–107)
CHOLEST SERPL-MCNC: 190 MG/DL (ref 0–200)
CO2 SERPL-SCNC: 21.3 MMOL/L (ref 22–29)
CREAT SERPL-MCNC: 0.56 MG/DL (ref 0.57–1)
DEPRECATED RDW RBC AUTO: 42.4 FL (ref 37–54)
EOSINOPHIL # BLD AUTO: 0.13 10*3/MM3 (ref 0–0.4)
EOSINOPHIL NFR BLD AUTO: 1.9 % (ref 0.3–6.2)
ERYTHROCYTE [DISTWIDTH] IN BLOOD BY AUTOMATED COUNT: 12.5 % (ref 12.3–15.4)
GFR SERPL CREATININE-BSD FRML MDRD: 105 ML/MIN/1.73
GLOBULIN UR ELPH-MCNC: 3 GM/DL
GLUCOSE SERPL-MCNC: 89 MG/DL (ref 65–99)
HCT VFR BLD AUTO: 44.1 % (ref 34–46.6)
HDLC SERPL-MCNC: 34 MG/DL (ref 40–60)
HGB BLD-MCNC: 14.6 G/DL (ref 12–15.9)
IMM GRANULOCYTES # BLD AUTO: 0.01 10*3/MM3 (ref 0–0.05)
IMM GRANULOCYTES NFR BLD AUTO: 0.1 % (ref 0–0.5)
LDLC SERPL CALC-MCNC: 122 MG/DL (ref 0–100)
LDLC/HDLC SERPL: 3.6 {RATIO}
LYMPHOCYTES # BLD AUTO: 2.47 10*3/MM3 (ref 0.7–3.1)
LYMPHOCYTES NFR BLD AUTO: 36 % (ref 19.6–45.3)
MCH RBC QN AUTO: 30.5 PG (ref 26.6–33)
MCHC RBC AUTO-ENTMCNC: 33.1 G/DL (ref 31.5–35.7)
MCV RBC AUTO: 92.3 FL (ref 79–97)
MONOCYTES # BLD AUTO: 0.55 10*3/MM3 (ref 0.1–0.9)
MONOCYTES NFR BLD AUTO: 8 % (ref 5–12)
NEUTROPHILS NFR BLD AUTO: 3.61 10*3/MM3 (ref 1.7–7)
NEUTROPHILS NFR BLD AUTO: 52.5 % (ref 42.7–76)
NRBC BLD AUTO-RTO: 0 /100 WBC (ref 0–0.2)
PLATELET # BLD AUTO: 360 10*3/MM3 (ref 140–450)
PMV BLD AUTO: 10.4 FL (ref 6–12)
POTASSIUM SERPL-SCNC: 4 MMOL/L (ref 3.5–5.2)
PROT SERPL-MCNC: 7.4 G/DL (ref 6–8.5)
RBC # BLD AUTO: 4.78 10*6/MM3 (ref 3.77–5.28)
SODIUM SERPL-SCNC: 140 MMOL/L (ref 136–145)
T4 FREE SERPL-MCNC: 1.1 NG/DL (ref 0.93–1.7)
TRIGL SERPL-MCNC: 168 MG/DL (ref 0–150)
TSH SERPL DL<=0.05 MIU/L-ACNC: 2.29 UIU/ML (ref 0.27–4.2)
VIT B12 BLD-MCNC: 242 PG/ML (ref 211–946)
VLDLC SERPL-MCNC: 33.6 MG/DL (ref 5–40)
WBC # BLD AUTO: 6.87 10*3/MM3 (ref 3.4–10.8)

## 2020-09-10 PROCEDURE — G0008 ADMIN INFLUENZA VIRUS VAC: HCPCS | Performed by: PHYSICIAN ASSISTANT

## 2020-09-10 PROCEDURE — 82306 VITAMIN D 25 HYDROXY: CPT

## 2020-09-10 PROCEDURE — 84443 ASSAY THYROID STIM HORMONE: CPT

## 2020-09-10 PROCEDURE — 80053 COMPREHEN METABOLIC PANEL: CPT

## 2020-09-10 PROCEDURE — 82043 UR ALBUMIN QUANTITATIVE: CPT

## 2020-09-10 PROCEDURE — 80061 LIPID PANEL: CPT

## 2020-09-10 PROCEDURE — 82607 VITAMIN B-12: CPT

## 2020-09-10 PROCEDURE — 84439 ASSAY OF FREE THYROXINE: CPT

## 2020-09-10 PROCEDURE — 85025 COMPLETE CBC W/AUTO DIFF WBC: CPT

## 2020-09-10 PROCEDURE — G0439 PPPS, SUBSEQ VISIT: HCPCS | Performed by: PHYSICIAN ASSISTANT

## 2020-09-10 PROCEDURE — 90694 VACC AIIV4 NO PRSRV 0.5ML IM: CPT | Performed by: PHYSICIAN ASSISTANT

## 2020-09-10 RX ORDER — MV-MIN/FA/VIT K/LUTEIN/ZEAXANT 200MCG-5MG
2 CAPSULE ORAL DAILY
COMMUNITY

## 2020-09-10 NOTE — PATIENT INSTRUCTIONS
Advance Directive    Advance directives are legal documents that let you make choices ahead of time about your health care and medical treatment in case you become unable to communicate for yourself. Advance directives are a way for you to communicate your wishes to family, friends, and health care providers. This can help convey your decisions about end-of-life care if you become unable to communicate.  Discussing and writing advance directives should happen over time rather than all at once. Advance directives can be changed depending on your situation and what you want, even after you have signed the advance directives.  If you do not have an advance directive, some states assign family decision makers to act on your behalf based on how closely you are related to them. Each state has its own laws regarding advance directives. You may want to check with your health care provider, , or state representative about the laws in your state. There are different types of advance directives, such as:  · Medical power of .  · Living will.  · Do not resuscitate (DNR) or do not attempt resuscitation (DNAR) order.  Health care proxy and medical power of   A health care proxy, also called a health care agent, is a person who is appointed to make medical decisions for you in cases in which you are unable to make the decisions yourself. Generally, people choose someone they know well and trust to represent their preferences. Make sure to ask this person for an agreement to act as your proxy. A proxy may have to exercise judgment in the event of a medical decision for which your wishes are not known.  A medical power of  is a legal document that names your health care proxy. Depending on the laws in your state, after the document is written, it may also need to be:  · Signed.  · Notarized.  · Dated.  · Copied.  · Witnessed.  · Incorporated into your medical record.  You may also want to appoint  someone to manage your financial affairs in a situation in which you are unable to do so. This is called a durable power of  for finances. It is a separate legal document from the durable power of  for health care. You may choose the same person or someone different from your health care proxy to act as your agent in financial matters.  If you do not appoint a proxy, or if there is a concern that the proxy is not acting in your best interests, a court-appointed guardian may be designated to act on your behalf.  Living will  A living will is a set of instructions documenting your wishes about medical care when you cannot express them yourself. Health care providers should keep a copy of your living will in your medical record. You may want to give a copy to family members or friends. To alert caregivers in case of an emergency, you can place a card in your wallet to let them know that you have a living will and where they can find it. A living will is used if you become:  · Terminally ill.  · Incapacitated.  · Unable to communicate or make decisions.  Items to consider in your living will include:  · The use or non-use of life-sustaining equipment, such as dialysis machines and breathing machines (ventilators).  · A DNR or DNAR order, which is the instruction not to use cardiopulmonary resuscitation (CPR) if breathing or heartbeat stops.  · The use or non-use of tube feeding.  · Withholding of food and fluids.  · Comfort (palliative) care when the goal becomes comfort rather than a cure.  · Organ and tissue donation.  A living will does not give instructions for distributing your money and property if you should pass away. It is recommended that you seek the advice of a  when writing a will. Decisions about taxes, beneficiaries, and asset distribution will be legally binding. This process can relieve your family and friends of any concerns surrounding disputes or questions that may come up about  the distribution of your assets.  DNR or DNAR  A DNR or DNAR order is a request not to have CPR in the event that your heart stops beating or you stop breathing. If a DNR or DNAR order has not been made and shared, a health care provider will try to help any patient whose heart has stopped or who has stopped breathing. If you plan to have surgery, talk with your health care provider about how your DNR or DNAR order will be followed if problems occur.  Summary  · Advance directives are the legal documents that allow you to make choices ahead of time about your health care and medical treatment in case you become unable to communicate for yourself.  · The process of discussing and writing advance directives should happen over time. You can change the advance directives, even after you have signed them.  · Advance directives include DNR or DNAR orders, living orr, and designating an agent as your medical power of .  This information is not intended to replace advice given to you by your health care provider. Make sure you discuss any questions you have with your health care provider.  Document Released: 2009 Document Revised: 2020 Document Reviewed: 2017  [employer] Patient Education ©  [employer]    Medicare Wellness  Personal Prevention Plan of Service     Date of Office Visit:  09/10/2020  Encounter Provider:  Deja Benoit PA-C  Place of Service:  [facility] INTERNAL MEDICINE  Patient Name: Heidi Youssef  :  1940    As part of the Medicare Wellness portion of your visit today, we are providing you with this personalized preventive plan of services (PPPS). This plan is based upon recommendations of the [place] Preventive Services Task Force (USPSTF) and the Advisory Committee on Immunization Practices (ACIP).    This lists the preventive care services that should be considered, and provides dates of when you are due. Items listed as completed  are up-to-date and do not require any further intervention.    Health Maintenance   Topic Date Due   • ZOSTER VACCINE (1 of 2) 11/02/1991   • HEPATITIS C SCREENING  11/28/2018   • INFLUENZA VACCINE  08/01/2020   • MEDICARE ANNUAL WELLNESS  09/09/2020   • LIPID PANEL  09/09/2020   • TDAP/TD VACCINES (2 - Td) 10/25/2022   • Pneumococcal Vaccine Once at 65 Years Old  Completed       Orders Placed This Encounter   Procedures   • Mammo Screening Digital Tomosynthesis Bilateral With CAD     Standing Status:   Future     Standing Expiration Date:   9/10/2021     Order Specific Question:   Reason for Exam:     Answer:   SCREENING FOR BREAST CANCER   • Fluad Quad >65 years   • Comprehensive Metabolic Panel     Standing Status:   Future   • Lipid Panel     Standing Status:   Future   • MicroAlbumin, Urine, Random - Urine, Clean Catch     Standing Status:   Future   • Vitamin D 25 Hydroxy     Standing Status:   Future   • Vitamin B12     Standing Status:   Future   • TSH     Standing Status:   Future   • T4, Free     Standing Status:   Future   • Ambulatory Referral to Physical Therapy Evaluate and treat     Referral Priority:   Routine     Referral Type:   Therapy     Referral Reason:   Specialty Services Required     Referral Location:   [facility]     Requested Specialty:   Physical Therapy     Number of Visits Requested:   1   • CBC & Differential     Standing Status:   Future     Order Specific Question:   Manual Differential     Answer:   No       No follow-ups on file.

## 2020-09-10 NOTE — PROGRESS NOTES
The ABCs of the Annual Wellness Visit  Subsequent Medicare Wellness Visit    Chief Complaint   Patient presents with   • Medicare Wellness-subsequent     She is fasting       Subjective   History of Present Illness:  Heidi Youssef is a 79 y.o. female who presents for a Subsequent Medicare Wellness Visit. Had multiple eye issues this past year.  Sees Dr. Wiggins and Azar.       HEALTH RISK ASSESSMENT    Recent Hospitalizations:  No hospitalization(s) within the last year.    Current Medical Providers:  Patient Care Team:  Kyle Piedra MD as PCP - General  Kyle Piedra MD as PCP - Family Medicine  Aroldo Wiggins MD as PCP - Claims Attributed  Kyle Piedra MD as Referring Physician (Internal Medicine)  Shabbir Askew MD as Consulting Physician (Ophthalmology)  Charanjit Rousseau as Consulting Physician (Orthopedic Surgery)    Smoking Status:  Social History     Tobacco Use   Smoking Status Never Smoker   Smokeless Tobacco Never Used       Alcohol Consumption:  Social History     Substance and Sexual Activity   Alcohol Use Yes   • Frequency: Monthly or less   • Drinks per session: 1 or 2   • Binge frequency: Never    Comment: occasional       Depression Screen:   PHQ-2/PHQ-9 Depression Screening 9/10/2020   Little interest or pleasure in doing things 0   Feeling down, depressed, or hopeless 0   Total Score 0       Fall Risk Screen:  STEADI Fall Risk Assessment was completed, and patient is at LOW risk for falls.Assessment completed on:9/10/2020    Health Habits and Functional and Cognitive Screening:  Functional & Cognitive Status 9/10/2020   Do you have difficulty preparing food and eating? No   Do you have difficulty bathing yourself, getting dressed or grooming yourself? No   Do you have difficulty using the toilet? No   Do you have difficulty moving around from place to place? No   Do you have trouble with steps or getting out of a bed or a chair? No   Current  Diet Well Balanced Diet   Dental Exam Up to date   Eye Exam Up to date   Exercise (times per week) 0 times per week   Current Exercise Activities Include None   Do you need help using the phone?  No   Are you deaf or do you have serious difficulty hearing?  No   Do you need help with transportation? No   Do you need help shopping? No   Do you need help preparing meals?  No   Do you need help with housework?  Yes   Do you need help with laundry? No   Do you need help taking your medications? No   Do you need help managing money? No   Do you ever drive or ride in a car without wearing a seat belt? No   Have you felt unusual stress, anger or loneliness in the last month? -   Who do you live with? Alone   If you need help, do you have trouble finding someone available to you? No   Have you been bothered in the last four weeks by sexual problems? No   Do you have difficulty concentrating, remembering or making decisions? No         Does the patient have evidence of cognitive impairment? No    Asprin use counseling:Does not need ASA (and currently is not on it)    Age-appropriate Screening Schedule:  Refer to the list below for future screening recommendations based on patient's age, sex and/or medical conditions. Orders for these recommended tests are listed in the plan section. The patient has been provided with a written plan.    Health Maintenance   Topic Date Due   • ZOSTER VACCINE (1 of 2) 11/02/1991   • INFLUENZA VACCINE  08/01/2020   • LIPID PANEL  09/09/2020   • TDAP/TD VACCINES (2 - Td) 10/25/2022          The following portions of the patient's history were reviewed and updated as appropriate: allergies, current medications, past family history, past medical history, past social history, past surgical history and problem list.    Outpatient Medications Prior to Visit   Medication Sig Dispense Refill   • acetaminophen (TYLENOL) 650 MG 8 hr tablet Take 650 mg by mouth Every 12 (Twelve) Hours As Needed for  Moderate Pain .     • Cholecalciferol (VITAMIN D) 2000 units capsule Take 2,000 Units by mouth Daily.     • Histamine Dihydrochloride (AUSTRALIAN DREAM ARTHRITIS EX) Apply  topically every night at bedtime.     • hydroCHLOROthiazide (HYDRODIURIL) 12.5 MG tablet TAKE 1/2 TABLET BY MOUTH EVERY DAY 15 tablet 4   • losartan (COZAAR) 25 MG tablet TAKE 1 TABLET BY MOUTH DAILY 30 tablet 4   • Misc Natural Products (GLUCOSAMINE CHONDROITIN TRIPLE) tablet Take 2 tablets by mouth Daily.     • Multiple Vitamins-Minerals (PRESERVISION AREDS 2+MULTI VIT) capsule Take 2 capsules by mouth Daily.       No facility-administered medications prior to visit.        Patient Active Problem List   Diagnosis   • Alcohol use   • Benign essential hypertension   • Paresthesias in right hand   • Paresthesias in left hand   • UTI (urinary tract infection)   • Mixed hyperlipidemia   • Sebaceous cyst   • Osteoarthritis   • Obesity   • Annual physical exam   • Chronic bilateral low back pain with right-sided sciatica   • Neck pain   • Breast cancer screening by mammogram   • Fatigue   • Medicare annual wellness visit, subsequent       Advanced Care Planning:  ACP discussion was held with the patient during this visit. Patient does not have an advance directive, information provided.    Review of Systems   Constitutional: Negative for chills, fatigue and fever.   HENT: Negative for congestion, ear pain and sinus pressure.    Eyes: Positive for visual disturbance.   Respiratory: Positive for shortness of breath. Negative for cough, chest tightness and wheezing.    Cardiovascular: Negative for chest pain and palpitations.   Gastrointestinal: Negative for abdominal pain, blood in stool and constipation.   Musculoskeletal: Positive for back pain.   Skin: Negative for color change.   Allergic/Immunologic: Negative for environmental allergies.   Neurological: Negative for dizziness, speech difficulty and headaches.   Psychiatric/Behavioral: Negative for  "confusion. The patient is not nervous/anxious.        Compared to one year ago, the patient feels her physical health is worse.  Compared to one year ago, the patient feels her mental health is the same.    Reviewed chart for potential of high risk medication in the elderly: yes  Reviewed chart for potential of harmful drug interactions in the elderly:yes    Objective         Vitals:    09/10/20 1004   BP: 138/74   BP Location: Left arm   Patient Position: Sitting   Cuff Size: Adult   Pulse: 72   Temp: 97.4 °F (36.3 °C)   TempSrc: Temporal   SpO2: 98%   Weight: 79.6 kg (175 lb 6.4 oz)   Height: 160 cm (62.99\")   PainSc:   1   PainLoc: Generalized       Body mass index is 31.08 kg/m².  Discussed the patient's BMI with her. The BMI is above average; BMI management plan is completed.    Physical Exam   Constitutional: She is oriented to person, place, and time. She appears well-developed and well-nourished.   HENT:   Head: Normocephalic and atraumatic.   Eyes: Pupils are equal, round, and reactive to light. Conjunctivae and EOM are normal.   Neck: Normal range of motion. Neck supple.   Neurological: She is alert and oriented to person, place, and time.   Psychiatric: She has a normal mood and affect. Her behavior is normal. Judgment and thought content normal.   Nursing note and vitals reviewed.            Assessment/Plan   Medicare Risks and Personalized Health Plan  CMS Preventative Services Quick Reference  Inactivity/Sedentary    The above risks/problems have been discussed with the patient.  Pertinent information has been shared with the patient in the After Visit Summary.  Follow up plans and orders are seen below in the Assessment/Plan Section.    Diagnoses and all orders for this visit:    1. Medicare annual wellness visit, subsequent (Primary)  Assessment & Plan:  Fasting labs today.  Mammogram ordered.  Declines DEXA  Colonoscopy 2025  Flu shot today      2. Mixed hyperlipidemia  -     Lipid Panel; " Future    3. Benign essential hypertension  -     CBC & Differential; Future  -     Comprehensive Metabolic Panel; Future  -     MicroAlbumin, Urine, Random - Urine, Clean Catch; Future    4. Vitamin D deficiency  -     Vitamin D 25 Hydroxy; Future    5. Chronic bilateral low back pain with right-sided sciatica  -     Cancel: Ambulatory Referral to Physical Therapy Evaluate and treat  -     Ambulatory Referral to Physical Therapy Evaluate and treat    6. Neck pain  -     Cancel: Ambulatory Referral to Physical Therapy Evaluate and treat  -     Ambulatory Referral to Physical Therapy Evaluate and treat    7. Breast cancer screening by mammogram  -     Mammo Screening Digital Tomosynthesis Bilateral With CAD; Future    8. Fatigue, unspecified type  -     TSH; Future  -     T4, Free; Future    9. Paresthesia  -     Vitamin B12; Future    Other orders  -     Fluad Quad >65 years    Follow Up:  Return in about 1 week (around 9/17/2020) for Recheck.     An After Visit Summary and PPPS were given to the patient.

## 2020-09-14 ENCOUNTER — TELEPHONE (OUTPATIENT)
Dept: INTERNAL MEDICINE | Facility: CLINIC | Age: 80
End: 2020-09-14

## 2020-09-14 NOTE — TELEPHONE ENCOUNTER
She can come in for an EKG today or in the morning.  Have her sit down and try and relax and see if deep breathing helps.  Drink some water.  If it increases and/or she has chest pain, have her go to ER

## 2020-09-14 NOTE — TELEPHONE ENCOUNTER
Patient called in stating that she has an irregular pulse. She was wondering if Peggy wanted her to come in this afternoon to discuss it. Please advise.    Patient Callback is: [phone]

## 2020-09-14 NOTE — TELEPHONE ENCOUNTER
Called and spoke with patient. She has not had chest pain and it has not gotten worse. Her daughter has checked on her. She understands that if it gets worse or chest pain starts to go to the ER. She reports her BP has been normal today. Appointment scheduled for tomorrow.

## 2020-09-15 ENCOUNTER — OFFICE VISIT (OUTPATIENT)
Dept: INTERNAL MEDICINE | Facility: CLINIC | Age: 80
End: 2020-09-15

## 2020-09-15 VITALS
DIASTOLIC BLOOD PRESSURE: 72 MMHG | WEIGHT: 175.4 LBS | BODY MASS INDEX: 31.08 KG/M2 | HEART RATE: 79 BPM | TEMPERATURE: 96.9 F | HEIGHT: 63 IN | SYSTOLIC BLOOD PRESSURE: 130 MMHG

## 2020-09-15 DIAGNOSIS — I49.9 IRREGULAR HEARTBEAT: Primary | ICD-10-CM

## 2020-09-15 PROCEDURE — 93000 ELECTROCARDIOGRAM COMPLETE: CPT | Performed by: NURSE PRACTITIONER

## 2020-09-15 PROCEDURE — 99214 OFFICE O/P EST MOD 30 MIN: CPT | Performed by: NURSE PRACTITIONER

## 2020-09-15 NOTE — PATIENT INSTRUCTIONS
SUSPECTED  Atrial Fibrillation    Atrial fibrillation is a type of heartbeat that is irregular or fast (rapid). If you have this condition, your heart beats without any order. This makes it hard for your heart to pump blood in a normal way. Having this condition gives you more risk for stroke, heart failure, and other heart problems.  Atrial fibrillation may start all of a sudden and then stop on its own, or it may become a long-lasting problem.  What are the causes?  This condition may be caused by heart conditions, such as:  · High blood pressure.  · Heart failure.  · Heart valve disease.  · Heart surgery.  Other causes include:  · Pneumonia.  · Obstructive sleep apnea.  · Lung cancer.  · Thyroid disease.  · Drinking too much alcohol.  Sometimes the cause is not known.  What increases the risk?  You are more likely to develop this condition if:  · You smoke.  · You are older.  · You have diabetes.  · You are overweight.  · You have a family history of this condition.  · You exercise often and hard.  What are the signs or symptoms?  Common symptoms of this condition include:  · A feeling like your heart is beating very fast.  · Chest pain.  · Feeling short of breath.  · Feeling light-headed or weak.  · Getting tired easily.  Follow these instructions at home:  Medicines  · Take over-the-counter and prescription medicines only as told by your doctor.  · If your doctor gives you a blood-thinning medicine, take it exactly as told. Taking too much of it can cause bleeding. Taking too little of it does not protect you against clots. Clots can cause a stroke.  Lifestyle         · Do not use any tobacco products. These include cigarettes, chewing tobacco, and e-cigarettes. If you need help quitting, ask your doctor.  · Do not drink alcohol.  · Do not drink beverages that have caffeine. These include coffee, soda, and tea.  · Follow diet instructions as told by your doctor.  · Exercise regularly as told by your  "doctor.  General instructions  · If you have a condition that causes breathing to stop for a short period of time (apnea), treat it as told by your doctor.  · Keep a healthy weight. Do not use diet pills unless your doctor says they are safe for you. Diet pills may make heart problems worse.  · Keep all follow-up visits as told by your doctor. This is important.  Contact a doctor if:  · You notice a change in the speed, rhythm, or strength of your heartbeat.  · You are taking a blood-thinning medicine and you see more bruising.  · You get tired more easily when you move or exercise.  · You have a sudden change in weight.  Get help right away if:    · You have pain in your chest or your belly (abdomen).  · You have trouble breathing.  · You have blood in your vomit, poop, or pee (urine).  · You have any signs of a stroke. \"BE FAST\" is an easy way to remember the main warning signs:  ? B - Balance. Signs are dizziness, sudden trouble walking, or loss of balance.  ? E - Eyes. Signs are trouble seeing or a change in how you see.  ? F - Face. Signs are sudden weakness or loss of feeling in the face, or the face or eyelid drooping on one side.  ? A - Arms. Signs are weakness or loss of feeling in an arm. This happens suddenly and usually on one side of the body.  ? S - Speech. Signs are sudden trouble speaking, slurred speech, or trouble understanding what people say.  ? T - Time. Time to call emergency services. Write down what time symptoms started.  · You have other signs of a stroke, such as:  ? A sudden, very bad headache with no known cause.  ? Feeling sick to your stomach (nausea).  ? Throwing up (vomiting).  ? Jerky movements you cannot control (seizure).  These symptoms may be an emergency. Do not wait to see if the symptoms will go away. Get medical help right away. Call your local emergency services (911 in the [place] Do not drive yourself to the hospital.  Summary  · Atrial fibrillation is a type of heartbeat " that is irregular or fast (rapid).  · You are at higher risk of this condition if you smoke, are older, have diabetes, or are overweight.  · Follow your doctor's instructions about medicines, diet, exercise, and follow-up visits.  · Get help right away if you think that you have signs of a stroke.  This information is not intended to replace advice given to you by your health care provider. Make sure you discuss any questions you have with your health care provider.  Document Released: 09/26/2009 Document Revised: 02/21/2019 Document Reviewed: 02/08/2019  [employer] Patient Education © 2020 [employer]

## 2020-09-15 NOTE — PROGRESS NOTES
Heidi Youssef  1940  [id number]  Patient Care Team:  Kyle Piedra MD as PCP - General  Kyle Piedra MD as PCP - Family Medicine  Aroldo Wiggins MD as PCP - Claims Attributed  Kyle Piedra MD as Referring Physician (Internal Medicine)  Shabbir Askew MD as Consulting Physician (Ophthalmology)  Charanjit Rousseau as Consulting Physician (Orthopedic Surgery)    Heidi Youssef is a pleasant 79 y.o. female who presents for evaluation of Irregular Heart Beat      Chief Complaint   Patient presents with   • Irregular Heart Beat       HPI:   Irregular heartbeat yest accompanied by fatigue and sob with walking around the house.  So tired she napped for 30 min.  Her radial pulse was irregular, not fast.  Her daughter came and checked bp and it detected irregular rhythm aslo , HR 80s and bp.  No leg swelling. No recent syncopal episodes.  avg bp 110/60 and was in this range yesterday also.  She admits she is anxious here today, her  passed 8 yrs ago and had long hx of afib and multiple CVAs.  Has had several episodes like this in the past year.   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   • Ocular histoplasmosis 1992   • Osteoarthritis      Past Surgical History:   Procedure Laterality Date   • CARPAL TUNNEL RELEASE  12/16/2015    Right   • JOINT REPLACEMENT      rt knee partial replacement   • REPLACEMENT TOTAL KNEE  2014   • TONSILLECTOMY  1952   • TUBAL ABDOMINAL LIGATION Bilateral 1975     Family History   Problem Relation Age of Onset   • Breast cancer Maternal Aunt 45   • Arthritis Mother         OA   • Hypertension Mother    • Diabetes Mother    • Coronary artery disease Mother    • Coronary artery disease Father    • Diabetes Maternal Uncle      Social History     Tobacco Use   Smoking Status Never Smoker   Smokeless Tobacco Never Used  "    Allergies   Allergen Reactions   • Bactrim [Sulfamethoxazole-Trimethoprim] Other (See Comments)     Severe joint pain   • Aspirin Palpitations   • Nsaids Palpitations       Current Outpatient Medications:   •  acetaminophen (TYLENOL) 650 MG 8 hr tablet, Take 650 mg by mouth Every 12 (Twelve) Hours As Needed for Moderate Pain ., Disp: , Rfl:   •  Cholecalciferol (VITAMIN D) 2000 units capsule, Take 2,000 Units by mouth Daily., Disp: , Rfl:   •  Histamine Dihydrochloride (AUSTRALIAN DREAM ARTHRITIS EX), Apply  topically every night at bedtime., Disp: , Rfl:   •  hydroCHLOROthiazide (HYDRODIURIL) 12.5 MG tablet, TAKE 1/2 TABLET BY MOUTH EVERY DAY, Disp: 15 tablet, Rfl: 4  •  losartan (COZAAR) 25 MG tablet, TAKE 1 TABLET BY MOUTH DAILY, Disp: 30 tablet, Rfl: 4  •  Misc Natural Products (GLUCOSAMINE CHONDROITIN TRIPLE) tablet, Take 2 tablets by mouth Daily., Disp: , Rfl:   •  Multiple Vitamins-Minerals (PRESERVISION AREDS 2+MULTI VIT) capsule, Take 2 capsules by mouth Daily., Disp: , Rfl:   •  rivaroxaban (Xarelto) 20 MG tablet, Take 1 tablet by mouth Daily., Disp: 30 tablet, Rfl: 2    Review of Systems   Constitutional: Positive for fatigue. Negative for chills and fever.   HENT: Negative for congestion, ear pain and sinus pressure.    Respiratory: Positive for shortness of breath. Negative for cough, chest tightness and wheezing.    Cardiovascular: Positive for palpitations. Negative for chest pain.   Gastrointestinal: Negative for abdominal pain, blood in stool and constipation.   Skin: Negative for color change.   Allergic/Immunologic: Negative for environmental allergies.   Neurological: Negative for dizziness, speech difficulty and headache.   Psychiatric/Behavioral: Negative for decreased concentration. The patient is nervous/anxious.      /72 Comment: edilma  Pulse 79   Temp 96.9 °F (36.1 °C) (Temporal)   Ht 160 cm (62.99\")   Wt 79.6 kg (175 lb 6.4 oz)   BMI 31.08 kg/m²       Physical " Exam  Constitutional:       Appearance: Normal appearance.   HENT:      Head: Normocephalic.      Comments: *wearing mask  Eyes:      Pupils: Pupils are equal, round, and reactive to light.   Cardiovascular:      Rate and Rhythm: Normal rate and regular rhythm.      Pulses: Normal pulses.   Pulmonary:      Effort: Pulmonary effort is normal.      Breath sounds: Normal breath sounds.   Neurological:      Mental Status: She is alert and oriented to person, place, and time.   Psychiatric:         Mood and Affect: Mood normal.         Behavior: Behavior normal.         Thought Content: Thought content normal.         Judgment: Judgment normal.         ECG 12 Lead    Date/Time: 9/15/2020 9:38 AM  Performed by: Bethany Bello APRN  Authorized by: Bethany Bello APRN   Comparison: compared with previous ECG from 6/7/2016  Similar to previous ECG  Rhythm: sinus rhythm  Rate: normal  BPM: 76    Clinical impression: normal ECG          Results Review:  I reviewed the patient's new clinical results.    Assessment/Plan:  Heidi was seen today for irregular heart beat.    Diagnoses and all orders for this visit:    Irregular heartbeat  Comments:  suspect PAF, refer cards, start xaralto 20mg daily, hold on adding beta blocker at this time  Orders:  -     Ambulatory Referral to [facility] [place] [facility] [place]  -     Holter Monitor - 72 Hour Up To 21 Days; Future  -     ECG 12 Lead    Other orders  -     rivaroxaban (Xarelto) 20 MG tablet; Take 1 tablet by mouth Daily.       Patient Instructions   SUSPECTED  Atrial Fibrillation    Atrial fibrillation is a type of heartbeat that is irregular or fast (rapid). If you have this condition, your heart beats without any order. This makes it hard for your heart to pump blood in a normal way. Having this condition gives you more risk for stroke, heart failure, and other heart problems.  Atrial fibrillation may start all of a sudden and then stop on its own, or it may  become a long-lasting problem.  What are the causes?  This condition may be caused by heart conditions, such as:  · High blood pressure.  · Heart failure.  · Heart valve disease.  · Heart surgery.  Other causes include:  · Pneumonia.  · Obstructive sleep apnea.  · Lung cancer.  · Thyroid disease.  · Drinking too much alcohol.  Sometimes the cause is not known.  What increases the risk?  You are more likely to develop this condition if:  · You smoke.  · You are older.  · You have diabetes.  · You are overweight.  · You have a family history of this condition.  · You exercise often and hard.  What are the signs or symptoms?  Common symptoms of this condition include:  · A feeling like your heart is beating very fast.  · Chest pain.  · Feeling short of breath.  · Feeling light-headed or weak.  · Getting tired easily.  Follow these instructions at home:  Medicines  · Take over-the-counter and prescription medicines only as told by your doctor.  · If your doctor gives you a blood-thinning medicine, take it exactly as told. Taking too much of it can cause bleeding. Taking too little of it does not protect you against clots. Clots can cause a stroke.  Lifestyle         · Do not use any tobacco products. These include cigarettes, chewing tobacco, and e-cigarettes. If you need help quitting, ask your doctor.  · Do not drink alcohol.  · Do not drink beverages that have caffeine. These include coffee, soda, and tea.  · Follow diet instructions as told by your doctor.  · Exercise regularly as told by your doctor.  General instructions  · If you have a condition that causes breathing to stop for a short period of time (apnea), treat it as told by your doctor.  · Keep a healthy weight. Do not use diet pills unless your doctor says they are safe for you. Diet pills may make heart problems worse.  · Keep all follow-up visits as told by your doctor. This is important.  Contact a doctor if:  · You notice a change in the speed,  "rhythm, or strength of your heartbeat.  · You are taking a blood-thinning medicine and you see more bruising.  · You get tired more easily when you move or exercise.  · You have a sudden change in weight.  Get help right away if:    · You have pain in your chest or your belly (abdomen).  · You have trouble breathing.  · You have blood in your vomit, poop, or pee (urine).  · You have any signs of a stroke. \"BE FAST\" is an easy way to remember the main warning signs:  ? B - Balance. Signs are dizziness, sudden trouble walking, or loss of balance.  ? E - Eyes. Signs are trouble seeing or a change in how you see.  ? F - Face. Signs are sudden weakness or loss of feeling in the face, or the face or eyelid drooping on one side.  ? A - Arms. Signs are weakness or loss of feeling in an arm. This happens suddenly and usually on one side of the body.  ? S - Speech. Signs are sudden trouble speaking, slurred speech, or trouble understanding what people say.  ? T - Time. Time to call emergency services. Write down what time symptoms started.  · You have other signs of a stroke, such as:  ? A sudden, very bad headache with no known cause.  ? Feeling sick to your stomach (nausea).  ? Throwing up (vomiting).  ? Jerky movements you cannot control (seizure).  These symptoms may be an emergency. Do not wait to see if the symptoms will go away. Get medical help right away. Call your local emergency services (911 in the [place] Do not drive yourself to the hospital.  Summary  · Atrial fibrillation is a type of heartbeat that is irregular or fast (rapid).  · You are at higher risk of this condition if you smoke, are older, have diabetes, or are overweight.  · Follow your doctor's instructions about medicines, diet, exercise, and follow-up visits.  · Get help right away if you think that you have signs of a stroke.  This information is not intended to replace advice given to you by your health care provider. Make sure you discuss any " questions you have with your health care provider.  Document Released: 09/26/2009 Document Revised: 02/21/2019 Document Reviewed: 02/08/2019  [employer] Patient Education © 2020 [employer]      Plan of care reviewed with patient at the conclusion of today's visit. Education was provided regarding diagnosis, management and any prescribed or recommended OTC medications.  Patient verbalizes understanding of and agreement with management plan.    Return for Next scheduled follow up.    *Note that portions of this note were completed with a voice recognition program.  Efforts were made to edit the dictation but occasionally words are transcribed.    Bethany Bello, APRN

## 2020-09-17 ENCOUNTER — OFFICE VISIT (OUTPATIENT)
Dept: INTERNAL MEDICINE | Facility: CLINIC | Age: 80
End: 2020-09-17

## 2020-09-17 VITALS
SYSTOLIC BLOOD PRESSURE: 136 MMHG | HEART RATE: 76 BPM | TEMPERATURE: 97.5 F | WEIGHT: 176.8 LBS | HEIGHT: 63 IN | BODY MASS INDEX: 31.33 KG/M2 | DIASTOLIC BLOOD PRESSURE: 84 MMHG

## 2020-09-17 DIAGNOSIS — I10 BENIGN ESSENTIAL HYPERTENSION: Primary | ICD-10-CM

## 2020-09-17 DIAGNOSIS — E66.09 CLASS 1 OBESITY DUE TO EXCESS CALORIES WITH SERIOUS COMORBIDITY AND BODY MASS INDEX (BMI) OF 31.0 TO 31.9 IN ADULT: ICD-10-CM

## 2020-09-17 DIAGNOSIS — M15.9 PRIMARY OSTEOARTHRITIS INVOLVING MULTIPLE JOINTS: ICD-10-CM

## 2020-09-17 DIAGNOSIS — E78.2 MIXED HYPERLIPIDEMIA: ICD-10-CM

## 2020-09-17 PROCEDURE — 99214 OFFICE O/P EST MOD 30 MIN: CPT | Performed by: INTERNAL MEDICINE

## 2020-09-17 NOTE — PROGRESS NOTES
[facility]     Heidi Youssef  1940   [id number]      Patient Care Team:  Kyle Piedra MD as PCP - General  Kyle Piedra MD as PCP - Family Medicine  Aroldo Wiggins MD as PCP - Claims Attributed  Kyle Piedra MD as Referring Physician (Internal Medicine)  Shabbir Askew MD as Consulting Physician (Ophthalmology)  Charanjit Rousseau as Consulting Physician (Orthopedic Surgery)    Chief Complaint::   Chief Complaint   Patient presents with   • Hyperlipidemia   • Hypertension        HPI  Ms. Youssef is now 79.  She comes in for follow-up of her hypertension, hyperlipidemia, obesity and arthritis.  Earlier this week she had an episode of irregular heartbeat, weakness and shortness of breath.  She was evaluated here in the office and had a normal EKG but the episode was highly suspicious for paroxysmal atrial fibrillation so she was started on Xarelto.  She has an appointment for a heart monitor.  She has had no further episodes.  Her major complaints now are arthritic pain in her knees her back and neck.  She also has carpal tunnel symptoms in her hands which are about the same as they were last year.  She is carefully taken precautions against COVID.  There is no fever or cough.  She admits that she has difficulty losing weight.  She is unable to exercise much because of knee and back pain.  She also admits that she is been eating well.    Chronic Conditions:      Patient Active Problem List   Diagnosis   • Alcohol use   • Benign essential hypertension   • Paresthesias in right hand   • Paresthesias in left hand   • UTI (urinary tract infection)   • Mixed hyperlipidemia   • Sebaceous cyst   • Osteoarthritis   • Obesity   • Annual physical exam   • Chronic bilateral low back pain with right-sided sciatica   • Neck pain   • Breast cancer screening by mammogram   • Fatigue   • Medicare annual wellness visit, subsequent        Past Medical History:    Diagnosis Date   • Arthritis    • Breast injury    • Hyperlipidemia    • Hypertension    • 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   • Ocular histoplasmosis 1992   • Osteoarthritis        Past Surgical History:   Procedure Laterality Date   • CARPAL TUNNEL RELEASE  12/16/2015    Right   • JOINT REPLACEMENT      rt knee partial replacement   • REPLACEMENT TOTAL KNEE  2014   • TONSILLECTOMY  1952   • TUBAL ABDOMINAL LIGATION Bilateral 1975       Family History   Problem Relation Age of Onset   • Breast cancer Maternal Aunt 45   • Arthritis Mother         OA   • Hypertension Mother    • Diabetes Mother    • Coronary artery disease Mother    • Coronary artery disease Father    • Diabetes Maternal Uncle        Social History     Socioeconomic History   • Marital status:      Spouse name: Not on file   • Number of children: Not on file   • Years of education: Not on file   • Highest education level: Not on file   Tobacco Use   • Smoking status: Never Smoker   • Smokeless tobacco: Never Used   Substance and Sexual Activity   • Alcohol use: Yes     Frequency: Monthly or less     Drinks per session: 1 or 2     Binge frequency: Never     Comment: occasional   • Drug use: No   • Sexual activity: Defer       Allergies   Allergen Reactions   • Bactrim [Sulfamethoxazole-Trimethoprim] Other (See Comments)     Severe joint pain   • Aspirin Palpitations   • Nsaids Palpitations         Current Outpatient Medications:   •  acetaminophen (TYLENOL) 650 MG 8 hr tablet, Take 650 mg by mouth Every 12 (Twelve) Hours As Needed for Moderate Pain ., Disp: , Rfl:   •  Cholecalciferol (VITAMIN D) 2000 units capsule, Take 2,000 Units by mouth Daily., Disp: , Rfl:   •  Histamine Dihydrochloride (AUSTRALIAN DREAM ARTHRITIS EX), Apply  topically every night at bedtime., Disp: , Rfl:   •  hydroCHLOROthiazide (HYDRODIURIL) 12.5 MG tablet, TAKE 1/2 TABLET BY MOUTH EVERY DAY, Disp: 15 tablet, Rfl: 4  •  losartan (COZAAR) 25 MG tablet,  "TAKE 1 TABLET BY MOUTH DAILY, Disp: 30 tablet, Rfl: 4  •  Misc Natural Products (GLUCOSAMINE CHONDROITIN TRIPLE) tablet, Take 2 tablets by mouth Daily., Disp: , Rfl:   •  Multiple Vitamins-Minerals (PRESERVISION AREDS 2+MULTI VIT) capsule, Take 2 capsules by mouth Daily., Disp: , Rfl:   •  rivaroxaban (Xarelto) 20 MG tablet, Take 1 tablet by mouth Daily., Disp: 30 tablet, Rfl: 2    Review of Systems   Constitutional: Positive for fatigue. Negative for chills and fever.   HENT: Negative for congestion, ear pain and sinus pressure.    Respiratory: Positive for shortness of breath. Negative for cough, chest tightness and wheezing.    Cardiovascular: Negative for chest pain and palpitations.   Gastrointestinal: Negative for abdominal pain, blood in stool and constipation.   Skin: Negative for color change.   Allergic/Immunologic: Negative for environmental allergies.   Neurological: Negative for dizziness, speech difficulty and headache.   Psychiatric/Behavioral: Negative for decreased concentration. The patient is not nervous/anxious.         Vital Signs  Vitals:    09/17/20 1037   BP: 136/84   BP Location: Left arm   Patient Position: Sitting   Cuff Size: Adult   Pulse: 76   Temp: 97.5 °F (36.4 °C)   Weight: 80.2 kg (176 lb 12.8 oz)   Height: 160 cm (62.99\")   PainSc:   4   PainLoc: Shoulder  Comment: Collar bone       Physical Exam  Vitals signs and nursing note reviewed.   Constitutional:       Appearance: She is well-developed. She is obese.   HENT:      Head: Normocephalic and atraumatic.      Right Ear: External ear normal.      Left Ear: External ear normal.      Nose: Nose normal.      Mouth/Throat:      Pharynx: No oropharyngeal exudate.   Eyes:      Conjunctiva/sclera: Conjunctivae normal.      Pupils: Pupils are equal, round, and reactive to light.   Neck:      Musculoskeletal: Normal range of motion and neck supple.      Thyroid: No thyromegaly.      Vascular: No JVD.   Cardiovascular:      Rate and Rhythm: " Normal rate and regular rhythm.      Heart sounds: Normal heart sounds. No murmur. No friction rub. No gallop.    Pulmonary:      Effort: Pulmonary effort is normal. No respiratory distress.      Breath sounds: Normal breath sounds. No wheezing or rales.   Abdominal:      General: Bowel sounds are normal. There is no distension.      Palpations: Abdomen is soft. There is no mass.      Tenderness: There is no abdominal tenderness. There is no guarding or rebound.      Hernia: No hernia is present.   Musculoskeletal: Normal range of motion.         General: No tenderness.   Lymphadenopathy:      Cervical: No cervical adenopathy.   Skin:     General: Skin is warm and dry.      Findings: No erythema or rash.   Neurological:      Mental Status: She is alert and oriented to person, place, and time.      Cranial Nerves: No cranial nerve deficit.      Sensory: No sensory deficit.      Motor: No abnormal muscle tone.      Coordination: Coordination normal.      Deep Tendon Reflexes: Reflexes normal.   Psychiatric:         Behavior: Behavior normal.         Thought Content: Thought content normal.         Judgment: Judgment normal.        Procedures    ACE III MINI             Assessment/Plan:    Heidi was seen today for hyperlipidemia and hypertension.    Diagnoses and all orders for this visit:    Benign essential hypertension    Mixed hyperlipidemia    Class 1 obesity due to excess calories with serious comorbidity and body mass index (BMI) of 31.0 to 31.9 in adult    Primary osteoarthritis involving multiple joints    Plan    Colonoscopy was performed 6/24/2015, due at 10 years.  She is up-to-date on mammography.    Blood pressure is well controlled on losartan and hydrochlorothiazide.  I agree that recent symptoms are very suspicious for either atrial fibrillation or coronary artery disease.  She will continue Xarelto until she has her cardiac work-up.    Her LDL is.  This is probably related to weight gain.  For now  she will continue working on reducing calories, fat in her diet and losing weight.    BMI is 31.  See instructions above.    She takes Tylenol infrequently for arthritis pain.  She is apparently allergic to aspirin and reluctant to take NSAIDs.    Plan of care reviewed with patient at the conclusion of today's visit. Education was provided regarding diagnosis, management, and any prescribed or recommended OTC medications.Patient verbalizes understanding of and agreement with management plan.         Kyle Piedra MD

## 2020-09-17 NOTE — PROGRESS NOTES
[facility]  [facility]      09/21/2020         Heidi Youssef  [address] [place] [address]  [unfilled]    1940    Kyle Piedra MD    Heidi Youssef is a 79 y.o. female.      Subjective:     Chief Complaint:  Irregular Heart Beat and Establish Care       HPI   79-year-old female with history of hypertension, hyperlipidemia who presents today for evaluation of palpitations at the request of DELORES Tom.  Patient reports several episodes of an irregular heartbeat.  Last week patient was walking around her house and felt palpitations, fatigue and shortness of breath.  She felt so tired she had taken a nap.  She reports that her radial pulse was irregular but not fast.  Blood pressure cuff also detected a regular rhythm.  She reports heart rates were in the 80s. She believes it lasted intermittently for several hours. Last episode was Friday night and felt very fatigued Saturday.  She was started on Xarelto by her primary care provider due to likelihood of it being atrial fibrillation. No associated dizziness or lightheadedness.  She does note some associated dyspnea on exertion.  Denies any chest pain.  She does report occasionally she will get some intermittent left shoulder discomfort which she believes is related to her arthritis and has been going on for quite some time.  She denies any exertional chest pain.  She admits to being mostly sedentary since the death of her , mostly secondary to severe arthritis    Cardiac risks: HTN, hyperlipidemia, age, obesity  Patient Active Problem List   Diagnosis   • Alcohol use   • Benign essential hypertension   • Paresthesias in right hand   • Paresthesias in left hand   • UTI (urinary tract infection)   • Mixed hyperlipidemia   • Sebaceous cyst   • Osteoarthritis   • Obesity   • Annual physical exam   • Chronic bilateral low back pain with right-sided sciatica   • Neck pain   • Breast cancer screening by mammogram    • Fatigue   • Medicare annual wellness visit, subsequent       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   • Ocular histoplasmosis 1992   • Osteoarthritis        Past Surgical History:   Procedure Laterality Date   • CARPAL TUNNEL RELEASE  12/16/2015    Right   • JOINT REPLACEMENT      rt knee partial replacement   • REPLACEMENT TOTAL KNEE  2014   • TONSILLECTOMY  1952   • TUBAL ABDOMINAL LIGATION Bilateral 1975       Family History   Problem Relation Age of Onset   • Breast cancer Maternal Aunt 45   • Arthritis Mother         OA   • Hypertension Mother    • Diabetes Mother    • Heart attack Mother 75   • Coronary artery disease Father 49   • Diabetes Maternal Uncle    • Atrial fibrillation Brother         Late 60s s/p ablation       Social History     Socioeconomic History   • Marital status:      Spouse name: Not on file   • Number of children: Not on file   • Years of education: Not on file   • Highest education level: Not on file   Tobacco Use   • Smoking status: Never Smoker   • Smokeless tobacco: Never Used   Substance and Sexual Activity   • Alcohol use: Yes     Frequency: Monthly or less     Drinks per session: 1 or 2     Binge frequency: Never     Comment: occasional   • Drug use: No   • Sexual activity: Defer   Social History Narrative    Caffeine 1 serving of coffee in the morning 1 serving tea occasional tea       Allergies   Allergen Reactions   • Bactrim [Sulfamethoxazole-Trimethoprim] Other (See Comments)     Severe joint pain   • Aspirin Palpitations   • Nsaids Palpitations         Current Outpatient Medications:   •  acetaminophen (TYLENOL) 650 MG 8 hr tablet, Take 650 mg by mouth Every 12 (Twelve) Hours As Needed for Moderate Pain ., Disp: , Rfl:   •  Cholecalciferol (VITAMIN D) 2000 units capsule, Take 2,000 Units by mouth Daily., Disp: , Rfl:   •  Histamine  Dihydrochloride (AUSTRALIAN DREAM ARTHRITIS EX), Apply  topically every night at bedtime., Disp: , Rfl:   •  hydroCHLOROthiazide (HYDRODIURIL) 12.5 MG tablet, TAKE 1/2 TABLET BY MOUTH EVERY DAY, Disp: 15 tablet, Rfl: 4  •  losartan (COZAAR) 25 MG tablet, TAKE 1 TABLET BY MOUTH DAILY, Disp: 30 tablet, Rfl: 4  •  Misc Natural Products (GLUCOSAMINE CHONDROITIN TRIPLE) tablet, Take 2 tablets by mouth Daily., Disp: , Rfl:   •  Multiple Vitamins-Minerals (PRESERVISION AREDS 2+MULTI VIT) capsule, Take 2 capsules by mouth Daily., Disp: , Rfl:   •  rivaroxaban (Xarelto) 20 MG tablet, Take 1 tablet by mouth Daily., Disp: 30 tablet, Rfl: 2    The following portions of the patient's history were reviewed today and updated as appropriate: allergies, current medications, past family history, past medical history, past social history, past surgical history and problem list     Review of Systems   Constitution: Positive for malaise/fatigue. Negative for chills and fever.   HENT: Negative.    Eyes: Negative.    Cardiovascular: Positive for dyspnea on exertion. Negative for chest pain, claudication, cyanosis, irregular heartbeat, leg swelling, near-syncope, orthopnea, palpitations, paroxysmal nocturnal dyspnea and syncope.   Respiratory: Negative for cough, shortness of breath and snoring.    Endocrine: Negative.    Hematologic/Lymphatic: Does not bruise/bleed easily.   Skin: Negative for poor wound healing.   Musculoskeletal: Positive for arthritis and back pain.   Gastrointestinal: Negative for abdominal pain, heartburn, hematemesis, melena, nausea and vomiting.   Genitourinary: Negative.  Negative for hematuria.   Neurological: Negative.    Psychiatric/Behavioral: Negative.    Allergic/Immunologic: Negative.        Objective:     Vitals:    09/21/20 1054 09/21/20 1056 09/21/20 1057   BP: 140/72 142/78 135/71   BP Location: Right arm Left arm Left arm   Patient Position: Sitting Standing Sitting   Cuff Size: Adult Adult Adult  "  Pulse: 71 77 71   Resp:   18   Temp:   97.8 °F (36.6 °C)   TempSrc:   Temporal   SpO2: 98% 98% 98%   Weight:   80.7 kg (178 lb)   Height:   160 cm (62.99\")       Body mass index is 31.54 kg/m².    Vitals signs reviewed.   Constitutional:       General: Not in acute distress.     Appearance: Well-developed.   Eyes:      Conjunctiva/sclera: Conjunctivae normal.      Pupils: Pupils are equal, round, and reactive to light.   HENT:      Head: Normocephalic.   Neck:      Musculoskeletal: Neck supple.      Thyroid: No thyromegaly.      Vascular: No JVD.   Pulmonary:      Effort: Pulmonary effort is normal. No respiratory distress.      Breath sounds: Normal breath sounds.   Chest:      Chest wall: Not tender to palpatation.   Cardiovascular:      Normal rate. Occasional ectopic beats. Regular rhythm.      Murmurs: There is a grade 2/6 systolic murmur.      No gallop.   Pulses:     Intact distal pulses.   Abdominal:      General: Bowel sounds are normal.      Palpations: Abdomen is soft.   Musculoskeletal: Normal range of motion.   Skin:     General: Skin is warm and dry.   Neurological:      Mental Status: Alert and oriented to person, place, and time.   Psychiatric:         Behavior: Behavior normal.         Thought Content: Thought content normal.         Lab and Diagnostic Review:  Results for orders placed or performed in visit on 09/10/20   Comprehensive Metabolic Panel    Specimen: Blood   Result Value Ref Range    Glucose 89 65 - 99 mg/dL    BUN 17 8 - 23 mg/dL    Creatinine 0.56 (L) 0.57 - 1.00 mg/dL    Sodium 140 136 - 145 mmol/L    Potassium 4.0 3.5 - 5.2 mmol/L    Chloride 108 (H) 98 - 107 mmol/L    CO2 21.3 (L) 22.0 - 29.0 mmol/L    Calcium 9.2 8.6 - 10.5 mg/dL    Total Protein 7.4 6.0 - 8.5 g/dL    Albumin 4.40 3.50 - 5.20 g/dL    ALT (SGPT) 15 1 - 33 U/L    AST (SGOT) 19 1 - 32 U/L    Alkaline Phosphatase 72 39 - 117 U/L    Total Bilirubin 0.6 0.0 - 1.2 mg/dL    eGFR Non African Amer 105 >60 mL/min/1.73    " Globulin 3.0 gm/dL    A/G Ratio 1.5 g/dL    BUN/Creatinine Ratio 30.4 (H) 7.0 - 25.0    Anion Gap 10.7 5.0 - 15.0 mmol/L   Lipid Panel    Specimen: Blood   Result Value Ref Range    Total Cholesterol 190 0 - 200 mg/dL    Triglycerides 168 (H) 0 - 150 mg/dL    HDL Cholesterol 34 (L) 40 - 60 mg/dL    LDL Cholesterol  122 (H) 0 - 100 mg/dL    VLDL Cholesterol 33.6 5 - 40 mg/dL    LDL/HDL Ratio 3.60    MicroAlbumin, Urine, Random - Urine, Clean Catch    Specimen: Urine, Clean Catch   Result Value Ref Range    Microalbumin, Urine 8.3 mg/dL   Vitamin D 25 Hydroxy    Specimen: Blood   Result Value Ref Range    25 Hydroxy, Vitamin D 49.9 30.0 - 100.0 ng/ml   Vitamin B12    Specimen: Blood   Result Value Ref Range    Vitamin B-12 242 211 - 946 pg/mL   TSH    Specimen: Blood   Result Value Ref Range    TSH 2.290 0.270 - 4.200 uIU/mL   T4, Free    Specimen: Blood   Result Value Ref Range    Free T4 1.10 0.93 - 1.70 ng/dL   CBC Auto Differential    Specimen: Blood   Result Value Ref Range    WBC 6.87 3.40 - 10.80 10*3/mm3    RBC 4.78 3.77 - 5.28 10*6/mm3    Hemoglobin 14.6 12.0 - 15.9 g/dL    Hematocrit 44.1 34.0 - 46.6 %    MCV 92.3 79.0 - 97.0 fL    MCH 30.5 26.6 - 33.0 pg    MCHC 33.1 31.5 - 35.7 g/dL    RDW 12.5 12.3 - 15.4 %    RDW-SD 42.4 37.0 - 54.0 fl    MPV 10.4 6.0 - 12.0 fL    Platelets 360 140 - 450 10*3/mm3    Neutrophil % 52.5 42.7 - 76.0 %    Lymphocyte % 36.0 19.6 - 45.3 %    Monocyte % 8.0 5.0 - 12.0 %    Eosinophil % 1.9 0.3 - 6.2 %    Basophil % 1.5 0.0 - 1.5 %    Immature Grans % 0.1 0.0 - 0.5 %    Neutrophils, Absolute 3.61 1.70 - 7.00 10*3/mm3    Lymphocytes, Absolute 2.47 0.70 - 3.10 10*3/mm3    Monocytes, Absolute 0.55 0.10 - 0.90 10*3/mm3    Eosinophils, Absolute 0.13 0.00 - 0.40 10*3/mm3    Basophils, Absolute 0.10 0.00 - 0.20 10*3/mm3    Immature Grans, Absolute 0.01 0.00 - 0.05 10*3/mm3    nRBC 0.0 0.0 - 0.2 /100 WBC     EKG 9/15/20 Normal sinus rhythm    Assessment and Plan:   1.  Palpitations  Highly suspicious for AF  She was started on Xarelto by her PCP  - Mobile Cardiac Outpatient Telemetry; Future  - Adult Transthoracic Echo Complete W/ Cont if Necessary Per Protocol; Future    2. Dyspnea on exertion  - Adult Transthoracic Echo Complete W/ Cont if Necessary Per Protocol; Future    3. Benign essential hypertension  Appears controlled  Continue to monitor  - Adult Transthoracic Echo Complete W/ Cont if Necessary Per Protocol; Future    4. Heart murmur  Will check echo.  Reviewed echo from 2016 with which showed no significant valve disease    RV 6 weeks        It has been a pleasure to participate in the care of this patient.  Patient was instructed to call the [facility] Center with any questions, concerns, or worsening symptoms.    *Please note that portions of this note were completed with a voice recognition program. Efforts were made to edit the dictations, but occasionally words are mistranscribed.

## 2020-09-21 ENCOUNTER — HOSPITAL ENCOUNTER (OUTPATIENT)
Dept: CARDIOLOGY | Facility: HOSPITAL | Age: 80
Discharge: HOME | End: 2020-09-21

## 2020-09-21 ENCOUNTER — OFFICE VISIT (OUTPATIENT)
Dept: CARDIOLOGY | Facility: HOSPITAL | Age: 80
End: 2020-09-21

## 2020-09-21 ENCOUNTER — APPOINTMENT (OUTPATIENT)
Dept: CARDIOLOGY | Facility: HOSPITAL | Age: 80
End: 2020-09-21

## 2020-09-21 VITALS
DIASTOLIC BLOOD PRESSURE: 71 MMHG | TEMPERATURE: 97.8 F | RESPIRATION RATE: 18 BRPM | HEART RATE: 71 BPM | HEIGHT: 63 IN | SYSTOLIC BLOOD PRESSURE: 135 MMHG | WEIGHT: 178 LBS | OXYGEN SATURATION: 98 % | BODY MASS INDEX: 31.54 KG/M2

## 2020-09-21 DIAGNOSIS — R00.2 PALPITATIONS: ICD-10-CM

## 2020-09-21 DIAGNOSIS — R00.2 PALPITATIONS: Primary | ICD-10-CM

## 2020-09-21 DIAGNOSIS — I10 BENIGN ESSENTIAL HYPERTENSION: ICD-10-CM

## 2020-09-21 DIAGNOSIS — R01.1 HEART MURMUR: ICD-10-CM

## 2020-09-21 DIAGNOSIS — R06.09 DYSPNEA ON EXERTION: ICD-10-CM

## 2020-09-21 PROCEDURE — 99214 OFFICE O/P EST MOD 30 MIN: CPT | Performed by: NURSE PRACTITIONER

## 2020-09-21 NOTE — PROGRESS NOTES
[facility] Heart Monitor Documentation    Heidi TREVIÑO Mikael  1940  [id number]  09/21/20    DELORES Arora    [] ZIO XT Patch  Model [id number] Prescribed for N/A Days    · Serial Number: (N + 9 Digits) N   · Apply-By Date on Box:   · USPS Tracking Number:   · USPS Tracking        [] [employer] MINI PLUS Mobile Cardiac Telemetry  Model BGMINIPLUS Prescribed for 30 Days    · Serial Number: (BGM + 7 Digits) [id number]  · Shipped-By Date on Box: 43171838  · UPS Tracking Number: [id number]  · UPS Tracking      [] [employer] MINI Holter Monitor  Model BGMINIEL Prescribed for N/A Days    · Serial Number: (7 Digits)   · Shipped-By Date on Box:   · UPS Tracking Number: [id number]  · UPS Tracking        This monitor was applied to the patient's chest and checked for proper functioning.  Ms. Heidi Youssef was instructed in the proper use of this monitor.  She was given the opportunity to ask questions and left the office with the device 's instruction manual.    Nidia Gerardo MA, 11:27 EDT, 09/21/20                  [facility]

## 2020-09-23 ENCOUNTER — TELEPHONE (OUTPATIENT)
Dept: INTERNAL MEDICINE | Facility: CLINIC | Age: 80
End: 2020-09-23

## 2020-09-23 NOTE — TELEPHONE ENCOUNTER
Mammogram ordered 9/10/20  Pt. Declined to schedule due to other things going on. Can order be canceled?

## 2020-09-28 ENCOUNTER — TELEPHONE (OUTPATIENT)
Dept: INTERNAL MEDICINE | Facility: CLINIC | Age: 80
End: 2020-09-28

## 2020-09-28 RX ORDER — HYDROCHLOROTHIAZIDE 12.5 MG/1
TABLET ORAL
Qty: 15 TABLET | Refills: 11 | Status: SHIPPED | OUTPATIENT
Start: 2020-09-28 | End: 2020-11-04

## 2020-09-28 RX ORDER — LOSARTAN POTASSIUM 25 MG/1
25 TABLET ORAL DAILY
Qty: 30 TABLET | Refills: 11 | Status: SHIPPED | OUTPATIENT
Start: 2020-09-28 | End: 2021-09-13 | Stop reason: SDUPTHER

## 2020-09-28 NOTE — TELEPHONE ENCOUNTER
I placed 2 weeks samples of xarelto on [facility] (they are 10 mg tabs, so she will need to take 2 a day)

## 2020-09-28 NOTE — TELEPHONE ENCOUNTER
PATIENT REQUESTING A CALL BACK REGARDING    rivaroxaban (Xarelto) 20 MG tablet    PATIENT IS GOING TO RUN OUT OF  THIS RX BEFORE HEART MONITOR IS COMPLETE. DOES SHE NEED TO COME BY OFFICE OR DOES A RX NEED TO BE CALLED IN FOR THIS MEDICATION    PATIENT ALSO NEEDS  REFILLS ON    hydroCHLOROthiazide (HYDRODIURIL) 12.5 MG tablet    losartan (COZAAR) 25 MG tablet    PHARMACY    [id number] [place], [place] - [address] AT [facility] [address]  [phone] [facility] [phone] FX    PATIENT CALL BACK    [phone]

## 2020-10-12 ENCOUNTER — HOSPITAL ENCOUNTER (OUTPATIENT)
Dept: CARDIOLOGY | Facility: HOSPITAL | Age: 80
Discharge: HOME | End: 2020-10-12
Admitting: NURSE PRACTITIONER

## 2020-10-12 VITALS — HEIGHT: 63 IN | WEIGHT: 178 LBS | BODY MASS INDEX: 31.54 KG/M2

## 2020-10-12 DIAGNOSIS — R06.09 DYSPNEA ON EXERTION: ICD-10-CM

## 2020-10-12 DIAGNOSIS — R00.2 PALPITATIONS: ICD-10-CM

## 2020-10-12 DIAGNOSIS — I10 BENIGN ESSENTIAL HYPERTENSION: ICD-10-CM

## 2020-10-12 LAB
BH CV ECHO MEAS - AO MAX PG (FULL): -0.29 MMHG
BH CV ECHO MEAS - AO MAX PG: 7 MMHG
BH CV ECHO MEAS - AO MEAN PG (FULL): 0 MMHG
BH CV ECHO MEAS - AO MEAN PG: 4 MMHG
BH CV ECHO MEAS - AO ROOT AREA (BSA CORRECTED): 1.7
BH CV ECHO MEAS - AO ROOT AREA: 7.5 CM^2
BH CV ECHO MEAS - AO ROOT DIAM: 3.1 CM
BH CV ECHO MEAS - AO V2 MAX: 136 CM/SEC
BH CV ECHO MEAS - AO V2 MEAN: 93.4 CM/SEC
BH CV ECHO MEAS - AO V2 VTI: 30.7 CM
BH CV ECHO MEAS - AVA(I,A): 3.3 CM^2
BH CV ECHO MEAS - AVA(I,D): 3.3 CM^2
BH CV ECHO MEAS - AVA(V,A): 3.1 CM^2
BH CV ECHO MEAS - AVA(V,D): 3.1 CM^2
BH CV ECHO MEAS - BSA(HAYCOCK): 1.9 M^2
BH CV ECHO MEAS - BSA: 1.8 M^2
BH CV ECHO MEAS - BZI_BMI: 31.5 KILOGRAMS/M^2
BH CV ECHO MEAS - BZI_METRIC_HEIGHT: 160 CM
BH CV ECHO MEAS - BZI_METRIC_WEIGHT: 80.7 KG
BH CV ECHO MEAS - EDV(CUBED): 50.7 ML
BH CV ECHO MEAS - EDV(MOD-SP2): 67.1 ML
BH CV ECHO MEAS - EDV(MOD-SP4): 98.4 ML
BH CV ECHO MEAS - EDV(TEICH): 58.1 ML
BH CV ECHO MEAS - EF(CUBED): 69.2 %
BH CV ECHO MEAS - EF(MOD-BP): 61.9 %
BH CV ECHO MEAS - EF(MOD-SP2): 55.1 %
BH CV ECHO MEAS - EF(MOD-SP4): 65.9 %
BH CV ECHO MEAS - EF(TEICH): 61.6 %
BH CV ECHO MEAS - ESV(CUBED): 15.6 ML
BH CV ECHO MEAS - ESV(MOD-SP2): 30.1 ML
BH CV ECHO MEAS - ESV(MOD-SP4): 33.6 ML
BH CV ECHO MEAS - ESV(TEICH): 22.3 ML
BH CV ECHO MEAS - FS: 32.4 %
BH CV ECHO MEAS - IVS/LVPW: 1.1
BH CV ECHO MEAS - IVSD: 0.9 CM
BH CV ECHO MEAS - LA DIMENSION: 3.4 CM
BH CV ECHO MEAS - LA/AO: 1.1
BH CV ECHO MEAS - LAD MAJOR: 4.7 CM
BH CV ECHO MEAS - LAT PEAK E' VEL: 9.2 CM/SEC
BH CV ECHO MEAS - LATERAL E/E' RATIO: 11.4
BH CV ECHO MEAS - LV DIASTOLIC VOL/BSA (35-75): 53.5 ML/M^2
BH CV ECHO MEAS - LV IVRT: 0.09 SEC
BH CV ECHO MEAS - LV MASS(C)D: 89.5 GRAMS
BH CV ECHO MEAS - LV MASS(C)DI: 48.6 GRAMS/M^2
BH CV ECHO MEAS - LV MAX PG: 7.3 MMHG
BH CV ECHO MEAS - LV MEAN PG: 4 MMHG
BH CV ECHO MEAS - LV SYSTOLIC VOL/BSA (12-30): 18.3 ML/M^2
BH CV ECHO MEAS - LV V1 MAX: 135 CM/SEC
BH CV ECHO MEAS - LV V1 MEAN: 86.1 CM/SEC
BH CV ECHO MEAS - LV V1 VTI: 32 CM
BH CV ECHO MEAS - LVIDD: 3.7 CM
BH CV ECHO MEAS - LVIDS: 2.5 CM
BH CV ECHO MEAS - LVLD AP2: 6.8 CM
BH CV ECHO MEAS - LVLD AP4: 7.3 CM
BH CV ECHO MEAS - LVLS AP2: 5.9 CM
BH CV ECHO MEAS - LVLS AP4: 5.6 CM
BH CV ECHO MEAS - LVOT AREA (M): 3.1 CM^2
BH CV ECHO MEAS - LVOT AREA: 3.1 CM^2
BH CV ECHO MEAS - LVOT DIAM: 2 CM
BH CV ECHO MEAS - LVPWD: 0.8 CM
BH CV ECHO MEAS - MED PEAK E' VEL: 7.2 CM/SEC
BH CV ECHO MEAS - MEDIAL E/E' RATIO: 14.5
BH CV ECHO MEAS - MV A MAX VEL: 91.8 CM/SEC
BH CV ECHO MEAS - MV DEC SLOPE: 397 CM/SEC^2
BH CV ECHO MEAS - MV DEC TIME: 0.22 SEC
BH CV ECHO MEAS - MV E MAX VEL: 105 CM/SEC
BH CV ECHO MEAS - MV E/A: 1.1
BH CV ECHO MEAS - MV P1/2T MAX VEL: 113 CM/SEC
BH CV ECHO MEAS - MV P1/2T: 83.4 MSEC
BH CV ECHO MEAS - MVA P1/2T LCG: 1.9 CM^2
BH CV ECHO MEAS - MVA(P1/2T): 2.6 CM^2
BH CV ECHO MEAS - PA ACC TIME: 0.18 SEC
BH CV ECHO MEAS - PA MAX PG: 6.6 MMHG
BH CV ECHO MEAS - PA PR(ACCEL): -0.2 MMHG
BH CV ECHO MEAS - PA V2 MAX: 128 CM/SEC
BH CV ECHO MEAS - RAP SYSTOLE: 8 MMHG
BH CV ECHO MEAS - RVSP: 38 MMHG
BH CV ECHO MEAS - SI(AO): 125.9 ML/M^2
BH CV ECHO MEAS - SI(CUBED): 19 ML/M^2
BH CV ECHO MEAS - SI(LVOT): 54.6 ML/M^2
BH CV ECHO MEAS - SI(MOD-SP2): 20.1 ML/M^2
BH CV ECHO MEAS - SI(MOD-SP4): 35.2 ML/M^2
BH CV ECHO MEAS - SI(TEICH): 19.5 ML/M^2
BH CV ECHO MEAS - SV(AO): 231.7 ML
BH CV ECHO MEAS - SV(CUBED): 35 ML
BH CV ECHO MEAS - SV(LVOT): 100.5 ML
BH CV ECHO MEAS - SV(MOD-SP2): 37 ML
BH CV ECHO MEAS - SV(MOD-SP4): 64.8 ML
BH CV ECHO MEAS - SV(TEICH): 35.8 ML
BH CV ECHO MEAS - TAPSE (>1.6): 1.7 CM
BH CV ECHO MEAS - TR MAX PG: 30 MMHG
BH CV ECHO MEAS - TR MAX VEL: 274 CM/SEC
BH CV ECHO MEASUREMENTS AVERAGE E/E' RATIO: 12.8
BH CV VAS BP LEFT ARM: NORMAL MMHG
BH CV XLRA - RV BASE: 3.3 CM
BH CV XLRA - RV LENGTH: 5.4 CM
BH CV XLRA - RV MID: 2.1 CM
BH CV XLRA - TDI S': 12.7 CM/SEC
LEFT ATRIUM VOLUME INDEX: 23.5 ML/M^2
LEFT ATRIUM VOLUME: 43.2 ML
LV EF 2D ECHO EST: 70 %

## 2020-10-12 PROCEDURE — 93306 TTE W/DOPPLER COMPLETE: CPT | Performed by: INTERNAL MEDICINE

## 2020-10-12 PROCEDURE — 93306 TTE W/DOPPLER COMPLETE: CPT

## 2020-10-14 ENCOUNTER — TELEPHONE (OUTPATIENT)
Dept: INTERNAL MEDICINE | Facility: CLINIC | Age: 80
End: 2020-10-14

## 2020-10-14 NOTE — TELEPHONE ENCOUNTER
PATIENT CALLED REGARDING rivaroxaban (Xarelto) 20 MG tablet, PATIENT WILL BE OUT OF MEDICATION THAT DR. IRIZARRY HAS BEEN GIVING HER ON Monday. HOWEVER, HER APPOINTMENT WITH CARDIOLOGIST ISN'T UNTIL NOV 4TH AND SHE WOULD LIKE TO KNOW IF SHE SHOULD STAY ON MEDICATION SHE WILL NEED SOME MORE. PATIENT STATED SHE WAS GIVEN THE MEDICINE AT THE OFFICE AND WANTS TO KNOW IF SHE CAN STOP AND PICK SOME UP.    PLEASE ADVISE.    Heidi Youssef    [phone]

## 2020-10-14 NOTE — TELEPHONE ENCOUNTER
We do not have samples. Please send in RX for patient. Patient has been informed that RX will be sent

## 2020-11-03 NOTE — PROGRESS NOTES
[facility]  Heart and Valve Center  Telemedicine note    11/04/2020         Heidi Youssef  [address] [place] [address]  [unfilled]    1940    Kyle Piedra MD    Heidi Youssef is a 80 y.o. female.      Subjective:     Chief Complaint:  Palpitations and Follow-up       This was an audio and telephone enabled telemedicine encounter.    HPI   79-year-old female with history of hypertension and hyperlipidemia who presents today as a telemedicine follow-up for palpitations. Her daughter is also present.  Echo showed normal left ventricular systolic function, moderate mitral annular calcification with no significant MR or MS. she wore a 30-day MCOT which was negative for significant arrhythmias.  Triggered events were PACs, PVCs and normal sinus rhythm. She reports ongoing palpitations associated with shortness of breath. She reports that sometimes episodes will last intermittently for hours. She does note that this typically causes her severe shortness of breath and has difficulty walking across the room. Denies chest pain. She notes that her BP monitor will also notify her that her HR is irregular    Cardiac risks: HTN, hyperlipidemia, age, obesity, family history  Patient Active Problem List   Diagnosis   • Alcohol use   • Benign essential hypertension   • Paresthesias in right hand   • Paresthesias in left hand   • UTI (urinary tract infection)   • Mixed hyperlipidemia   • Sebaceous cyst   • Osteoarthritis   • Obesity   • Annual physical exam   • Chronic bilateral low back pain with right-sided sciatica   • Neck pain   • Breast cancer screening by mammogram   • Fatigue   • Medicare annual wellness visit, subsequent       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   • Ocular histoplasmosis 1992   • Osteoarthritis        Past Surgical History:   Procedure Laterality  Date   • CARPAL TUNNEL RELEASE  12/16/2015    Right   • JOINT REPLACEMENT      rt knee partial replacement   • REPLACEMENT TOTAL KNEE  2014   • TONSILLECTOMY  1952   • TUBAL ABDOMINAL LIGATION Bilateral 1975       Family History   Problem Relation Age of Onset   • Breast cancer Maternal Aunt 45   • Arthritis Mother         OA   • Hypertension Mother    • Diabetes Mother    • Heart attack Mother 75   • Coronary artery disease Father 49   • Diabetes Maternal Uncle    • Atrial fibrillation Brother         Late 60s s/p ablation       Social History     Socioeconomic History   • Marital status:      Spouse name: Not on file   • Number of children: Not on file   • Years of education: Not on file   • Highest education level: Not on file   Tobacco Use   • Smoking status: Never Smoker   • Smokeless tobacco: Never Used   Substance and Sexual Activity   • Alcohol use: Yes     Frequency: Monthly or less     Drinks per session: 1 or 2     Binge frequency: Never     Comment: occasional   • Drug use: No   • Sexual activity: Defer   Social History Narrative    Caffeine 1 serving of coffee in the morning 1 serving tea occasional tea       Allergies   Allergen Reactions   • Bactrim [Sulfamethoxazole-Trimethoprim] Other (See Comments)     Severe joint pain   • Aspirin Palpitations   • Nsaids Palpitations         Current Outpatient Medications:   •  acetaminophen (TYLENOL) 650 MG 8 hr tablet, Take 650 mg by mouth Every 12 (Twelve) Hours As Needed for Moderate Pain ., Disp: , Rfl:   •  Cholecalciferol (VITAMIN D) 2000 units capsule, Take 2,000 Units by mouth Daily., Disp: , Rfl:   •  Histamine Dihydrochloride (AUSTRALIAN DREAM ARTHRITIS EX), Apply  topically every night at bedtime., Disp: , Rfl:   •  losartan (COZAAR) 25 MG tablet, Take 1 tablet by mouth Daily., Disp: 30 tablet, Rfl: 11  •  Misc Natural Products (GLUCOSAMINE CHONDROITIN TRIPLE) tablet, Take 2 tablets by mouth Daily., Disp: , Rfl:   •  Multiple Vitamins-Minerals  "(PRESERVISION AREDS 2+MULTI VIT) capsule, Take 2 capsules by mouth Daily., Disp: , Rfl:   •  metoprolol tartrate (LOPRESSOR) 25 MG tablet, Take 1 tablet by mouth 2 (Two) Times a Day., Disp: 60 tablet, Rfl: 3    The following portions of the patient's history were reviewed today and updated as appropriate: allergies, current medications, past family history, past medical history, past social history, past surgical history and problem list     Review of Systems   Constitution: Positive for malaise/fatigue. Negative for chills and fever.   HENT: Negative.    Eyes: Negative.    Cardiovascular: Positive for dyspnea on exertion and palpitations. Negative for chest pain, claudication, cyanosis, irregular heartbeat, leg swelling, near-syncope, orthopnea, paroxysmal nocturnal dyspnea and syncope.   Respiratory: Negative for cough, shortness of breath and snoring.    Endocrine: Negative.    Hematologic/Lymphatic: Does not bruise/bleed easily.   Skin: Negative for poor wound healing.   Musculoskeletal: Positive for arthritis and back pain.   Gastrointestinal: Negative for abdominal pain, heartburn, hematemesis, melena, nausea and vomiting.   Genitourinary: Negative.  Negative for hematuria.   Neurological: Negative.    Psychiatric/Behavioral: Negative.    Allergic/Immunologic: Negative.        Objective:     Vitals:    11/04/20 1027   BP: 123/76   BP Location: Right arm   Patient Position: Sitting   Cuff Size: Adult   Pulse: 80   Weight: 78.5 kg (173 lb)   Height: 160 cm (62.99\")       Body mass index is 30.66 kg/m².    Vitals signs reviewed.   Constitutional:       Appearance: Normal and healthy appearance. Not in distress.   Pulmonary:      Effort: Pulmonary effort is normal. No respiratory distress.   Neurological:      Mental Status: Alert and oriented to person, place and time.   Psychiatric:         Attention and Perception: Attention normal.         Mood and Affect: Mood normal.         Speech: Speech normal.         " Behavior: Behavior normal.         Lab and Diagnostic Review:  Echo 10/12/20  · Left ventricular systolic function is normal. Estimated left ventricular EF = 70%  · Moderate mitral annular calcification is present. No significant mitral regurgitation or mitral stenosis is present.  · Estimated right ventricular systolic pressure from tricuspid regurgitation is mildly elevated (38 mmHg).  30-day MCOT showed an average heart rate of 73, minimum to 51 and max heart of 139 bpm.  Triggered events were PACs PVCs and normal sinus rhythm.  There were rare PACs and PVCs.    Assessment and Plan:   1. Palpitations  Monitor shows episodes of symptomatic PVCs and PACs.  She continues to be very symptomatic.  However, I have reassured her that her PVC and PAC burden is low and there is no evidence of atrial fibrillation.  I told her she could go ahead and stop the Xarelto since she does not have a history of A. Fib and has no other suspicious findings such as left atrial enlargement or frequent PACs.  Due to her ongoing symptoms we will go ahead and start her on metoprolol 25 mg daily.  We will stop her hydrochlorothiazide due to risk for hypotension.  - Stress Test With Myocardial Perfusion (1 Day); Future    2. Dyspnea on exertion  She has significant symptoms that cannot be explained from her heart monitor or echo.  We will proceed with ischemic evaluation due to possible anginal equivalent  - Stress Test With Myocardial Perfusion (1 Day); Future    3. Benign essential hypertension  Well-controlled.  Stop hydrochlorothiazide since starting metoprolol.  Continue to monitor closely and call with any dizziness, lightheadedness or low blood pressures  - Stress Test With Myocardial Perfusion (1 Day); Future    4. Family history of premature CAD  - Stress Test With Myocardial Perfusion (1 Day); Future    Teleheath visit in one month  This visit has been scheduled as a video visit to comply with patient safety concerns in accordance  with CDC recommendations. Total time of discussion was 30 minutes.      It has been a pleasure to participate in the care of this patient.  Patient was instructed to call the [facility] with any questions, concerns, or worsening symptoms.    *Please note that portions of this note were completed with a voice recognition program. Efforts were made to edit the dictations, but occasionally words are mistranscribed.

## 2020-11-04 ENCOUNTER — TELEMEDICINE (OUTPATIENT)
Dept: CARDIOLOGY | Facility: HOSPITAL | Age: 80
End: 2020-11-04

## 2020-11-04 VITALS
HEART RATE: 80 BPM | BODY MASS INDEX: 30.65 KG/M2 | HEIGHT: 63 IN | DIASTOLIC BLOOD PRESSURE: 76 MMHG | SYSTOLIC BLOOD PRESSURE: 123 MMHG | WEIGHT: 173 LBS

## 2020-11-04 DIAGNOSIS — R06.09 DYSPNEA ON EXERTION: ICD-10-CM

## 2020-11-04 DIAGNOSIS — R00.2 PALPITATIONS: Primary | ICD-10-CM

## 2020-11-04 DIAGNOSIS — I10 BENIGN ESSENTIAL HYPERTENSION: ICD-10-CM

## 2020-11-04 DIAGNOSIS — Z82.49 FAMILY HISTORY OF PREMATURE CAD: ICD-10-CM

## 2020-11-04 PROCEDURE — 99214 OFFICE O/P EST MOD 30 MIN: CPT | Performed by: NURSE PRACTITIONER

## 2020-11-04 PROCEDURE — 93228 REMOTE 30 DAY ECG REV/REPORT: CPT | Performed by: INTERNAL MEDICINE

## 2020-12-02 NOTE — PROGRESS NOTES
[facility]  [facility]  Telemedicine note    12/03/2020         Heidi Youssef  [address] [place] [address]  [unfilled]    1940    Kyle Piedra MD    Heidi Youssef is a 80 y.o. female.      Subjective:     Chief Complaint:  Follow-up and Palpitations       This was an audio and telephone enabled telemedicine encounter.    HPI   79-year-old female with history of hypertension and hyperlipidemia who presents today as a telemedicine follow-up for palpitations and shortness of breath. Her daughter is also present.  Sshe wore a 30-day MCOT which was negative for significant arrhythmias.  Triggered events were PACs, PVCs and normal sinus rhythm.  PACs and PVCs <1% burden.  She continues to have ongoing symptoms of palpitations and shortness of breath.  Her echo was unremarkable.  She was started on metoprolol tartrate 25 mg twice daily and her HCTZ was stopped. She reports that her symptoms have improved with metoprolol. She still has intermittent episodes of shortness of breath and fatigue. She will occasionally see irregular heart beat on her BP monitor when she feels these symptoms. She is scheduled to have a stress test 12/11. She reports that she had one episode of right sided chest and shoulder pain at rest associated with shortness of breath. Otherwise, no other chest pain. Overall she feels better on the metoprolol still feels a general decline since the summer that she is very concerned about        Cardiac risks: HTN, hyperlipidemia, age, obesity, family history  Patient Active Problem List   Diagnosis   • Alcohol use   • Benign essential hypertension   • Paresthesias in right hand   • Paresthesias in left hand   • UTI (urinary tract infection)   • Mixed hyperlipidemia   • Sebaceous cyst   • Osteoarthritis   • Obesity   • Annual physical exam   • Chronic bilateral low back pain with right-sided sciatica   • Neck pain   • Breast cancer screening by mammogram   •  Fatigue   • Medicare annual wellness visit, subsequent       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   • Ocular histoplasmosis 1992   • Osteoarthritis        Past Surgical History:   Procedure Laterality Date   • CARPAL TUNNEL RELEASE  12/16/2015    Right   • JOINT REPLACEMENT      rt knee partial replacement   • REPLACEMENT TOTAL KNEE  2014   • TONSILLECTOMY  1952   • TUBAL ABDOMINAL LIGATION Bilateral 1975       Family History   Problem Relation Age of Onset   • Breast cancer Maternal Aunt 45   • Arthritis Mother         OA   • Hypertension Mother    • Diabetes Mother    • Heart attack Mother 75   • Coronary artery disease Father 49   • Diabetes Maternal Uncle    • Atrial fibrillation Brother         Late 60s s/p ablation       Social History     Socioeconomic History   • Marital status:      Spouse name: Not on file   • Number of children: Not on file   • Years of education: Not on file   • Highest education level: Not on file   Tobacco Use   • Smoking status: Never Smoker   • Smokeless tobacco: Never Used   Substance and Sexual Activity   • Alcohol use: Yes     Frequency: Monthly or less     Drinks per session: 1 or 2     Binge frequency: Never     Comment: occasional   • Drug use: No   • Sexual activity: Defer   Social History Narrative    Caffeine 1 serving of coffee in the morning 1 serving tea occasional tea       Allergies   Allergen Reactions   • Bactrim [Sulfamethoxazole-Trimethoprim] Other (See Comments)     Severe joint pain   • Aspirin Palpitations   • Nsaids Palpitations         Current Outpatient Medications:   •  acetaminophen (TYLENOL) 650 MG 8 hr tablet, Take 650 mg by mouth Every 12 (Twelve) Hours As Needed for Moderate Pain ., Disp: , Rfl:   •  Cholecalciferol (VITAMIN D) 2000 units capsule, Take 2,000 Units by mouth Daily., Disp: , Rfl:   •  Histamine Dihydrochloride  "(Norwegian DREAM ARTHRITIS EX), Apply  topically every night at bedtime., Disp: , Rfl:   •  losartan (COZAAR) 25 MG tablet, Take 1 tablet by mouth Daily., Disp: 30 tablet, Rfl: 11  •  metoprolol tartrate (LOPRESSOR) 25 MG tablet, Take 1 tablet by mouth 2 (Two) Times a Day., Disp: 60 tablet, Rfl: 3  •  Misc Natural Products (GLUCOSAMINE CHONDROITIN TRIPLE) tablet, Take 2 tablets by mouth Daily., Disp: , Rfl:   •  Multiple Vitamins-Minerals (PRESERVISION AREDS 2+MULTI VIT) capsule, Take 2 capsules by mouth Daily., Disp: , Rfl:     The following portions of the patient's history were reviewed today and updated as appropriate: allergies, current medications, past family history, past medical history, past social history, past surgical history and problem list     Review of Systems   Constitution: Positive for malaise/fatigue. Negative for chills and fever.   HENT: Negative.    Eyes: Negative.    Cardiovascular: Positive for dyspnea on exertion. Negative for chest pain, claudication, cyanosis, irregular heartbeat, leg swelling, near-syncope, orthopnea, palpitations, paroxysmal nocturnal dyspnea and syncope.   Respiratory: Negative for cough, shortness of breath and snoring.    Endocrine: Negative.    Hematologic/Lymphatic: Does not bruise/bleed easily.   Skin: Negative for poor wound healing.   Musculoskeletal: Positive for arthritis and back pain.   Gastrointestinal: Negative for abdominal pain, heartburn, hematemesis, melena, nausea and vomiting.   Genitourinary: Negative.  Negative for hematuria.   Neurological: Negative.    Psychiatric/Behavioral: Negative.    Allergic/Immunologic: Negative.        Objective:     Vitals:    12/03/20 1019   BP: 136/78   BP Location: Left arm   Patient Position: Sitting   Pulse: 67   Weight: 78.9 kg (174 lb)   Height: 160 cm (62.99\")       Body mass index is 30.83 kg/m².    Vitals signs reviewed.   Constitutional:       Appearance: Normal and healthy appearance. Not in distress. "   Pulmonary:      Effort: Pulmonary effort is normal. No respiratory distress.   Neurological:      Mental Status: Alert and oriented to person, place and time.   Psychiatric:         Attention and Perception: Attention normal.         Mood and Affect: Mood normal.         Speech: Speech normal.         Behavior: Behavior normal.         Lab and Diagnostic Review:  Echo 10/12/20  · Left ventricular systolic function is normal. Estimated left ventricular EF = 70%  · Moderate mitral annular calcification is present. No significant mitral regurgitation or mitral stenosis is present.  · Estimated right ventricular systolic pressure from tricuspid regurgitation is mildly elevated (38 mmHg).  30-day MCOT showed an average heart rate of 73, minimum to 51 and max heart of 139 bpm.  Triggered events were PACs PVCs and normal sinus rhythm.  There were rare PACs and PVCs.    Assessment and Plan:   1. Palpitations  Monitor showed episodes of symptomatic PVCs and PACs, both were low burden.   Continue metoprolol which seems to be helping her symptoms      2. Dyspnea on exertion  She has significant symptoms that cannot be explained from her heart monitor or echo.  Stress test scheduled for 12/11      3. Benign essential hypertension  Well-controlled.  Continue to montior          This visit has been scheduled as a video visit to comply with patient safety concerns in accordance with CDC recommendations. Total time of discussion was 15 minutes.      It has been a pleasure to participate in the care of this patient.  Patient was instructed to call the [facility] with any questions, concerns, or worsening symptoms.    *Please note that portions of this note were completed with a voice recognition program. Efforts were made to edit the dictations, but occasionally words are mistranscribed.

## 2020-12-03 ENCOUNTER — TELEMEDICINE (OUTPATIENT)
Dept: CARDIOLOGY | Facility: HOSPITAL | Age: 80
End: 2020-12-03

## 2020-12-03 VITALS
HEART RATE: 67 BPM | WEIGHT: 174 LBS | DIASTOLIC BLOOD PRESSURE: 78 MMHG | HEIGHT: 63 IN | BODY MASS INDEX: 30.83 KG/M2 | SYSTOLIC BLOOD PRESSURE: 136 MMHG

## 2020-12-03 PROCEDURE — 99213 OFFICE O/P EST LOW 20 MIN: CPT | Performed by: NURSE PRACTITIONER

## 2020-12-08 ENCOUNTER — APPOINTMENT (OUTPATIENT)
Dept: PREADMISSION TESTING | Facility: HOSPITAL | Age: 80
End: 2020-12-08

## 2020-12-08 PROCEDURE — C9803 HOPD COVID-19 SPEC COLLECT: HCPCS

## 2020-12-08 PROCEDURE — U0004 COV-19 TEST NON-CDC HGH THRU: HCPCS

## 2020-12-09 LAB — SARS-COV-2 RNA RESP QL NAA+PROBE: NOT DETECTED

## 2020-12-11 ENCOUNTER — HOSPITAL ENCOUNTER (OUTPATIENT)
Dept: CARDIOLOGY | Facility: HOSPITAL | Age: 80
Discharge: HOME | End: 2020-12-11
Admitting: NURSE PRACTITIONER

## 2020-12-11 VITALS
WEIGHT: 174 LBS | SYSTOLIC BLOOD PRESSURE: 176 MMHG | BODY MASS INDEX: 30.83 KG/M2 | HEART RATE: 67 BPM | DIASTOLIC BLOOD PRESSURE: 88 MMHG | HEIGHT: 63 IN

## 2020-12-11 DIAGNOSIS — R00.2 PALPITATIONS: ICD-10-CM

## 2020-12-11 DIAGNOSIS — R06.09 DYSPNEA ON EXERTION: ICD-10-CM

## 2020-12-11 DIAGNOSIS — I10 BENIGN ESSENTIAL HYPERTENSION: ICD-10-CM

## 2020-12-11 DIAGNOSIS — Z82.49 FAMILY HISTORY OF PREMATURE CAD: ICD-10-CM

## 2020-12-11 LAB
BH CV STRESS BP STAGE 1: NORMAL
BH CV STRESS BP STAGE 3: NORMAL
BH CV STRESS COMMENTS STAGE 1: NORMAL
BH CV STRESS DOSE REGADENOSON STAGE 1: 0.4
BH CV STRESS DURATION MIN STAGE 1: 1
BH CV STRESS DURATION MIN STAGE 2: 1
BH CV STRESS DURATION MIN STAGE 3: 1
BH CV STRESS DURATION MIN STAGE 4: 1
BH CV STRESS DURATION SEC STAGE 1: 0
BH CV STRESS DURATION SEC STAGE 2: 0
BH CV STRESS DURATION SEC STAGE 3: 0
BH CV STRESS DURATION SEC STAGE 4: 0
BH CV STRESS HR STAGE 1: 81
BH CV STRESS HR STAGE 2: 90
BH CV STRESS HR STAGE 3: 90
BH CV STRESS HR STAGE 4: 88
BH CV STRESS O2 STAGE 1: 98
BH CV STRESS O2 STAGE 2: 99
BH CV STRESS O2 STAGE 3: 99
BH CV STRESS O2 STAGE 4: 99
BH CV STRESS PROTOCOL 1: NORMAL
BH CV STRESS RECOVERY BP: NORMAL MMHG
BH CV STRESS RECOVERY HR: 75 BPM
BH CV STRESS RECOVERY O2: 100 %
BH CV STRESS STAGE 1: 1
BH CV STRESS STAGE 2: 2
BH CV STRESS STAGE 3: 3
BH CV STRESS STAGE 4: 4
LV EF NUC BP: 74 %
MAXIMAL PREDICTED HEART RATE: 141 BPM
PERCENT MAX PREDICTED HR: 65.96 %
STRESS BASELINE BP: NORMAL MMHG
STRESS BASELINE HR: 72 BPM
STRESS O2 SAT REST: 98 %
STRESS PERCENT HR: 78 %
STRESS POST ESTIMATED WORKLOAD: 1 METS
STRESS POST EXERCISE DUR MIN: 4 MIN
STRESS POST EXERCISE DUR SEC: 0 SEC
STRESS POST O2 SAT PEAK: 99 %
STRESS POST PEAK BP: NORMAL MMHG
STRESS POST PEAK HR: 93 BPM
STRESS TARGET HR: 120 BPM

## 2020-12-11 PROCEDURE — 93018 CV STRESS TEST I&R ONLY: CPT | Performed by: INTERNAL MEDICINE

## 2020-12-11 PROCEDURE — A9500 TC99M SESTAMIBI: HCPCS | Performed by: NURSE PRACTITIONER

## 2020-12-11 PROCEDURE — 25010000002 REGADENOSON 0.4 MG/5ML SOLUTION: Performed by: NURSE PRACTITIONER

## 2020-12-11 PROCEDURE — 93017 CV STRESS TEST TRACING ONLY: CPT

## 2020-12-11 PROCEDURE — 0 TECHNETIUM SESTAMIBI: Performed by: NURSE PRACTITIONER

## 2020-12-11 PROCEDURE — 78452 HT MUSCLE IMAGE SPECT MULT: CPT

## 2020-12-11 PROCEDURE — 78452 HT MUSCLE IMAGE SPECT MULT: CPT | Performed by: INTERNAL MEDICINE

## 2020-12-11 RX ADMIN — REGADENOSON 0.4 MG: 0.08 INJECTION, SOLUTION INTRAVENOUS at 10:22

## 2020-12-11 RX ADMIN — TECHNETIUM TC 99M SESTAMIBI 1 DOSE: 1 INJECTION INTRAVENOUS at 10:20

## 2020-12-11 RX ADMIN — TECHNETIUM TC 99M SESTAMIBI 1 DOSE: 1 INJECTION INTRAVENOUS at 08:35

## 2020-12-14 ENCOUNTER — TELEPHONE (OUTPATIENT)
Dept: CARDIOLOGY | Facility: HOSPITAL | Age: 80
End: 2020-12-14

## 2020-12-14 DIAGNOSIS — R06.09 DYSPNEA ON EXERTION: Primary | ICD-10-CM

## 2020-12-14 NOTE — TELEPHONE ENCOUNTER
Called patient with stress test results, which were within normal limits. She continues to have episodes of shortness of breath. Palpitations have improved with metoprolol.  She reports a history of histoplasmosis and wonders if it possibly could be her lungs.  I discussed referring her to cardiology for a second opinion.  Right now she would like to pursue further evaluation of her lungs.  Recommend chest x-ray and PFTs.  She would like to start with a chest x-ray and consider PFTs at a later time.  Will call once I receive chest x-ray results

## 2020-12-15 ENCOUNTER — HOSPITAL ENCOUNTER (OUTPATIENT)
Dept: GENERAL RADIOLOGY | Facility: HOSPITAL | Age: 80
Discharge: HOME | End: 2020-12-15
Admitting: NURSE PRACTITIONER

## 2020-12-15 DIAGNOSIS — R06.09 DYSPNEA ON EXERTION: ICD-10-CM

## 2020-12-15 PROCEDURE — 71046 X-RAY EXAM CHEST 2 VIEWS: CPT

## 2020-12-16 ENCOUNTER — TELEPHONE (OUTPATIENT)
Dept: CARDIOLOGY | Facility: HOSPITAL | Age: 80
End: 2020-12-16

## 2020-12-16 DIAGNOSIS — R06.09 DYSPNEA ON EXERTION: Primary | ICD-10-CM

## 2020-12-16 DIAGNOSIS — J44.9 CHRONIC OBSTRUCTIVE PULMONARY DISEASE, UNSPECIFIED COPD TYPE (HCC): ICD-10-CM

## 2020-12-16 NOTE — TELEPHONE ENCOUNTER
Called patient with CXR results which showed possible COPD. She reports that her palpitations have been stable on the metoprolol. She continues to have RAMOS and episodes of SOB sometimes at night. Denies snoring or hx of sleep apnea. She is a nonsmoker, but was exposed to second hand smoke. 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 She does says that she wonders if she had COVID in the spring. Recommend that she see pulmonary. If no significant explanation of ongoing symptoms, I advised her to call.  Patient verbalized understanding with no further questions or concerns

## 2021-01-06 ENCOUNTER — OFFICE VISIT (OUTPATIENT)
Dept: INTERNAL MEDICINE | Facility: CLINIC | Age: 81
End: 2021-01-06

## 2021-01-06 VITALS
TEMPERATURE: 96.9 F | DIASTOLIC BLOOD PRESSURE: 78 MMHG | SYSTOLIC BLOOD PRESSURE: 134 MMHG | BODY MASS INDEX: 31.01 KG/M2 | HEART RATE: 60 BPM | WEIGHT: 175 LBS | HEIGHT: 63 IN

## 2021-01-06 DIAGNOSIS — L72.3 SEBACEOUS CYST: Primary | ICD-10-CM

## 2021-01-06 DIAGNOSIS — I10 BENIGN ESSENTIAL HYPERTENSION: ICD-10-CM

## 2021-01-06 PROCEDURE — 99213 OFFICE O/P EST LOW 20 MIN: CPT | Performed by: NURSE PRACTITIONER

## 2021-01-06 RX ORDER — CEPHALEXIN 500 MG/1
500 CAPSULE ORAL 3 TIMES DAILY
Qty: 21 CAPSULE | Refills: 0 | Status: SHIPPED | OUTPATIENT
Start: 2021-01-06 | End: 2021-09-13

## 2021-01-06 NOTE — PROGRESS NOTES
Heidi Youssef  1940  [id number]  Patient Care Team:  Kyle Piedra MD as PCP - General  Kyle Piedra MD as PCP - Family Medicine  Kyle Piedra MD as Referring Physician (Internal Medicine)  Shabbir Askew MD as Consulting Physician (Ophthalmology)  Charanjit Rousseau as Consulting Physician (Orthopedic Surgery)    Heidi Youssef is a pleasant 80 y.o. female who presents for evaluation of Cyst (red, swollen cyst on lower back )    Chief Complaint   Patient presents with   • Cyst     red, swollen cyst on lower back        HPI:   Cyst on back recurrent.  Last epidsode 3 yrs ago; was scheudled to be removed inpatient but it had resolved by the time she was scheduled  Flared last week. Was uncomfortable by Monday.  Used heat on it Monday which helped, now down by 50%, no drainage.    Home bp; recent cards eval. Off hctz and on losartan 25.  avg home 118/60s  Symptomatic PVCs:  Better on metoprolol  Sob some better as well but has pulm f/u shcedled after inidental finding of COPD. 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   • Ocular histoplasmosis 1992   • Osteoarthritis      Past Surgical History:   Procedure Laterality Date   • CARPAL TUNNEL RELEASE  12/16/2015    Right   • JOINT REPLACEMENT      rt knee partial replacement   • REPLACEMENT TOTAL KNEE  2014   • TONSILLECTOMY  1952   • TUBAL ABDOMINAL LIGATION Bilateral 1975     Family History   Problem Relation Age of Onset   • Breast cancer Maternal Aunt 45   • Arthritis Mother         OA   • Hypertension Mother    • Diabetes Mother    • Heart attack Mother 75   • Coronary artery disease Father 49   • Diabetes Maternal Uncle    • Atrial fibrillation Brother         Late 60s s/p ablation     Social History     Tobacco Use   Smoking Status Never Smoker   Smokeless Tobacco Never Used  "    Allergies   Allergen Reactions   • Bactrim [Sulfamethoxazole-Trimethoprim] Other (See Comments)     Severe joint pain   • Aspirin Palpitations   • Nsaids Palpitations       Current Outpatient Medications:   •  acetaminophen (TYLENOL) 650 MG 8 hr tablet, Take 650 mg by mouth Every 12 (Twelve) Hours As Needed for Moderate Pain ., Disp: , Rfl:   •  Cholecalciferol (VITAMIN D) 2000 units capsule, Take 2,000 Units by mouth Daily., Disp: , Rfl:   •  Histamine Dihydrochloride (AUSTRALIAN DREAM ARTHRITIS EX), Apply  topically every night at bedtime., Disp: , Rfl:   •  losartan (COZAAR) 25 MG tablet, Take 1 tablet by mouth Daily., Disp: 30 tablet, Rfl: 11  •  metoprolol tartrate (LOPRESSOR) 25 MG tablet, Take 1 tablet by mouth 2 (Two) Times a Day., Disp: 60 tablet, Rfl: 3  •  Misc Natural Products (GLUCOSAMINE CHONDROITIN TRIPLE) tablet, Take 2 tablets by mouth Daily., Disp: , Rfl:   •  Multiple Vitamins-Minerals (PRESERVISION AREDS 2+MULTI VIT) capsule, Take 2 capsules by mouth Daily., Disp: , Rfl:   •  cephalexin (Keflex) 500 MG capsule, Take 1 capsule by mouth 3 (Three) Times a Day., Disp: 21 capsule, Rfl: 0    Review of Systems   Constitutional: Negative for chills, fatigue and fever.   HENT: Negative for congestion, ear pain and sinus pressure.    Respiratory: Negative for cough, chest tightness, shortness of breath and wheezing.    Cardiovascular: Negative for chest pain and palpitations.   Gastrointestinal: Negative for abdominal pain, blood in stool and constipation.   Skin: Negative for color change.   Allergic/Immunologic: Negative for environmental allergies.   Neurological: Negative for dizziness, speech difficulty and headache.   Psychiatric/Behavioral: Negative for decreased concentration. The patient is not nervous/anxious.      /78   Pulse 60   Temp 96.9 °F (36.1 °C) (Temporal)   Ht 160 cm (62.99\")   Wt 79.4 kg (175 lb)   BMI 31.01 kg/m²     Physical Exam  Vitals signs reviewed. "   Constitutional:       Appearance: She is well-developed.   Pulmonary:      Effort: Pulmonary effort is normal.   Skin:     General: Skin is warm and dry.          Neurological:      Mental Status: She is alert.   Psychiatric:         Behavior: Behavior normal.         Procedures    Results Review:  None    PHQ-9 Total Score: 0    Assessment/Plan:  Diagnoses and all orders for this visit:    1. Sebaceous cyst (Primary)  Comments:  recurrent, continue warm moist compressess, refer to derm, only start abx if worse  Orders:  -     Ambulatory Referral to Dermatology    2. Benign essential hypertension  Comments:  continue same meds and monitor at home    Other orders  -     cephalexin (Keflex) 500 MG capsule; Take 1 capsule by mouth 3 (Three) Times a Day.  Dispense: 21 capsule; Refill: 0       There are no Patient Instructions on file for this visit.  Plan of care reviewed with patient at the conclusion of today's visit. Education was provided regarding diagnosis, management and any prescribed or recommended OTC medications.  Patient verbalizes understanding of and agreement with management plan.    No follow-ups on file.    *Note that portions of this note were completed with a voice recognition program.  Efforts were made to edit the dictation but occasionally words are transcribed.    DELORES Andersen

## 2021-01-21 ENCOUNTER — OFFICE VISIT (OUTPATIENT)
Dept: PULMONOLOGY | Facility: CLINIC | Age: 81
End: 2021-01-21

## 2021-01-21 VITALS
HEART RATE: 74 BPM | SYSTOLIC BLOOD PRESSURE: 138 MMHG | TEMPERATURE: 97.1 F | HEIGHT: 63 IN | OXYGEN SATURATION: 94 % | DIASTOLIC BLOOD PRESSURE: 82 MMHG | BODY MASS INDEX: 31.54 KG/M2 | WEIGHT: 178 LBS

## 2021-01-21 DIAGNOSIS — R06.02 SHORTNESS OF BREATH: Primary | ICD-10-CM

## 2021-01-21 PROCEDURE — 99204 OFFICE O/P NEW MOD 45 MIN: CPT | Performed by: INTERNAL MEDICINE

## 2021-01-21 NOTE — PROGRESS NOTES
New Patient Pulmonary Office Visit      Patient Name: Heidi Youssef    Referring Physician: Haylie Harris A*    Chief Complaint:    Chief Complaint   Patient presents with   • COPD       History of Present Illness: Heidi Youssef is a 80 y.o. female who is here today to establish care with Pulmonary.  Patient has a past medical history significant for hypertension.  She was having episodes of shortness of breath and was evaluated by cardiology she underwent a stress test which showed no signs of ischemia.  She also had some palpitations started on beta-blocker.  She did undergo an echo back in October 2020 showed a mildly elevated RVSP at 38 mmHg, with a EF of 70%, no significant valvular abnormalities.  She states that the shortness of breath began back in September.  At which point time she was found to have multiple heart palpitations.  She was started on metoprolol since then the palpitations have stopped and her shortness of breath is entirely resolved at this point time.  She denies any cough and she is a never smoker.      Review of Systems:   Review of Systems   Constitutional: Negative for activity change, appetite change, chills and diaphoresis.   HENT: Negative for congestion, postnasal drip, sinus pressure and voice change.    Eyes: Negative for blurred vision.   Respiratory: Positive for shortness of breath. Negative for cough and wheezing.    Cardiovascular: Negative for chest pain.   Gastrointestinal: Negative for abdominal pain.   Musculoskeletal: Negative for myalgias.   Skin: Negative for color change and dry skin.   Allergic/Immunologic: Negative for environmental allergies.   Neurological: Negative for weakness and confusion.   Hematological: Negative for adenopathy.   Psychiatric/Behavioral: Negative for sleep disturbance and depressed mood.       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   • Ocular histoplasmosis 1992   • Osteoarthritis        Past Surgical History:   Past Surgical History:   Procedure Laterality Date   • CARPAL TUNNEL RELEASE  12/16/2015    Right   • JOINT REPLACEMENT      rt knee partial replacement   • REPLACEMENT TOTAL KNEE  2014   • TONSILLECTOMY  1952   • TUBAL ABDOMINAL LIGATION Bilateral 1975       Family History:   Family History   Problem Relation Age of Onset   • Breast cancer Maternal Aunt 45   • Arthritis Mother         OA   • Hypertension Mother    • Diabetes Mother    • Heart attack Mother 75   • Coronary artery disease Father 49   • Diabetes Maternal Uncle    • Atrial fibrillation Brother         Late 60s s/p ablation       Social History:   Social History     Socioeconomic History   • Marital status:      Spouse name: Not on file   • Number of children: Not on file   • Years of education: Not on file   • Highest education level: Not on file   Tobacco Use   • Smoking status: Never Smoker   • Smokeless tobacco: Never Used   Substance and Sexual Activity   • Alcohol use: Yes     Frequency: Monthly or less     Drinks per session: 1 or 2     Binge frequency: Never     Comment: occasional   • Drug use: No   • Sexual activity: Defer   Social History Narrative    Caffeine 1 serving of coffee in the morning 1 serving tea occasional tea       Medications:     Current Outpatient Medications:   •  acetaminophen (TYLENOL) 650 MG 8 hr tablet, Take 650 mg by mouth Every 12 (Twelve) Hours As Needed for Moderate Pain ., Disp: , Rfl:   •  cephalexin (Keflex) 500 MG capsule, Take 1 capsule by mouth 3 (Three) Times a Day., Disp: 21 capsule, Rfl: 0  •  Cholecalciferol (VITAMIN D) 2000 units capsule, Take 2,000 Units by mouth Daily., Disp: , Rfl:   •  Histamine Dihydrochloride (AUSTRALIAN DREAM ARTHRITIS EX), Apply  topically every night at bedtime., Disp: , Rfl:   •  losartan (COZAAR) 25 MG tablet, Take 1 tablet by mouth Daily., Disp:  "30 tablet, Rfl: 11  •  metoprolol tartrate (LOPRESSOR) 25 MG tablet, Take 1 tablet by mouth 2 (Two) Times a Day., Disp: 60 tablet, Rfl: 3  •  Misc Natural Products (GLUCOSAMINE CHONDROITIN TRIPLE) tablet, Take 2 tablets by mouth Daily., Disp: , Rfl:   •  Multiple Vitamins-Minerals (PRESERVISION AREDS 2+MULTI VIT) capsule, Take 2 capsules by mouth Daily., Disp: , Rfl:     Allergies:   Allergies   Allergen Reactions   • Bactrim [Sulfamethoxazole-Trimethoprim] Other (See Comments)     Severe joint pain   • Aspirin Palpitations   • Nsaids Palpitations       Physical Exam:  Vital Signs:   Vitals:    01/21/21 1344   BP: 138/82   Pulse: 74   Temp: 97.1 °F (36.2 °C)   SpO2: 94%  Comment: resting at room air   Weight: 80.7 kg (178 lb)   Height: 160 cm (62.99\")       Physical Exam  Vitals signs and nursing note reviewed.   Constitutional:       General: She is not in acute distress.     Appearance: She is well-developed and normal weight. She is not ill-appearing or toxic-appearing.   HENT:      Head: Normocephalic and atraumatic.   Cardiovascular:      Rate and Rhythm: Normal rate and regular rhythm.      Pulses: Normal pulses.      Heart sounds: Normal heart sounds. No murmur. No friction rub. No gallop.    Pulmonary:      Effort: Pulmonary effort is normal. No respiratory distress.      Breath sounds: Normal breath sounds. No wheezing, rhonchi or rales.   Musculoskeletal:      Right lower leg: No edema.      Left lower leg: No edema.   Skin:     General: Skin is warm and dry.   Neurological:      Mental Status: She is alert and oriented to person, place, and time.         Results Review:   - I personally reviewed the pts imaging from chest x-ray 12/15/2020 shows no acute cardiopulmonary process  - I personally reviewed the pts Echo report from 10/12/2020 shows a EF of 70%, no valvular maladies, and a elevated RVSP of 38 mmHg.  - I personally reviewed the pts stress test report from 12/2020 which showed low risk for " ischemia.    Assessment / Plan:   1. Shortness of breath (Primary)  -At this point time the patient shortness of breath is completely resolved, this could have been secondary to the palpitation she was having with just adjusting to being on a beta-blocker.  Her chest x-ray is unremarkable, she does not smoke so I think it is very likely that she would have COPD.  She did have some mild asthma, I did offer to do a set of pulmonary function testing on her so we can get a better evaluation she is felt that since her shortness of breath has resolved she did not want to undergo the pulmonary function testing.  Given that the chest x-ray was normal I do not think a CT scan would provide us any more information at this time especially in the setting that she is not having ongoing shortness of breath.  If her shortness of breath were to return I would be happy to reevaluate her and get a set of pulmonary function testing with further imaging pending that testing.      Follow Up:   Return if symptoms worsen or fail to improve.     CATARINA Bruno, DO  Pulmonary and Critical Care Medicine  Note Electronically Signed    Please note that portions of this note may have been completed with a voice recognition program. Efforts were made to edit the dictations, but occasionally words are mistranscribed.

## 2021-09-13 ENCOUNTER — LAB (OUTPATIENT)
Dept: LAB | Facility: HOSPITAL | Age: 81
End: 2021-09-13

## 2021-09-13 ENCOUNTER — OFFICE VISIT (OUTPATIENT)
Dept: INTERNAL MEDICINE | Facility: CLINIC | Age: 81
End: 2021-09-13

## 2021-09-13 VITALS
DIASTOLIC BLOOD PRESSURE: 70 MMHG | RESPIRATION RATE: 16 BRPM | BODY MASS INDEX: 31.04 KG/M2 | WEIGHT: 175.2 LBS | HEART RATE: 72 BPM | SYSTOLIC BLOOD PRESSURE: 128 MMHG | HEIGHT: 63 IN | TEMPERATURE: 98 F | OXYGEN SATURATION: 98 %

## 2021-09-13 DIAGNOSIS — R20.2 PARESTHESIA: ICD-10-CM

## 2021-09-13 DIAGNOSIS — Z00.00 MEDICARE ANNUAL WELLNESS VISIT, SUBSEQUENT: Primary | ICD-10-CM

## 2021-09-13 DIAGNOSIS — L72.3 SEBACEOUS CYST: ICD-10-CM

## 2021-09-13 DIAGNOSIS — E55.9 VITAMIN D DEFICIENCY: ICD-10-CM

## 2021-09-13 DIAGNOSIS — I10 BENIGN ESSENTIAL HYPERTENSION: ICD-10-CM

## 2021-09-13 DIAGNOSIS — E78.2 MIXED HYPERLIPIDEMIA: ICD-10-CM

## 2021-09-13 DIAGNOSIS — E66.09 CLASS 1 OBESITY DUE TO EXCESS CALORIES WITH SERIOUS COMORBIDITY AND BODY MASS INDEX (BMI) OF 31.0 TO 31.9 IN ADULT: ICD-10-CM

## 2021-09-13 DIAGNOSIS — I49.9 IRREGULAR HEARTBEAT: ICD-10-CM

## 2021-09-13 DIAGNOSIS — E11.3292: ICD-10-CM

## 2021-09-13 LAB
25(OH)D3 SERPL-MCNC: 49.3 NG/ML
BASOPHILS # BLD AUTO: 0.08 10*3/MM3 (ref 0–0.2)
BASOPHILS NFR BLD AUTO: 1.3 % (ref 0–1.5)
DEPRECATED RDW RBC AUTO: 44.8 FL (ref 37–54)
EOSINOPHIL # BLD AUTO: 0.08 10*3/MM3 (ref 0–0.4)
EOSINOPHIL NFR BLD AUTO: 1.3 % (ref 0.3–6.2)
ERYTHROCYTE [DISTWIDTH] IN BLOOD BY AUTOMATED COUNT: 13.1 % (ref 12.3–15.4)
HCT VFR BLD AUTO: 44.6 % (ref 34–46.6)
HGB BLD-MCNC: 14.1 G/DL (ref 12–15.9)
IMM GRANULOCYTES # BLD AUTO: 0.01 10*3/MM3 (ref 0–0.05)
IMM GRANULOCYTES NFR BLD AUTO: 0.2 % (ref 0–0.5)
LYMPHOCYTES # BLD AUTO: 2.08 10*3/MM3 (ref 0.7–3.1)
LYMPHOCYTES NFR BLD AUTO: 32.9 % (ref 19.6–45.3)
MCH RBC QN AUTO: 29.6 PG (ref 26.6–33)
MCHC RBC AUTO-ENTMCNC: 31.6 G/DL (ref 31.5–35.7)
MCV RBC AUTO: 93.5 FL (ref 79–97)
MONOCYTES # BLD AUTO: 0.51 10*3/MM3 (ref 0.1–0.9)
MONOCYTES NFR BLD AUTO: 8.1 % (ref 5–12)
NEUTROPHILS NFR BLD AUTO: 3.57 10*3/MM3 (ref 1.7–7)
NEUTROPHILS NFR BLD AUTO: 56.2 % (ref 42.7–76)
NRBC BLD AUTO-RTO: 0 /100 WBC (ref 0–0.2)
PLATELET # BLD AUTO: 351 10*3/MM3 (ref 140–450)
PMV BLD AUTO: 10.1 FL (ref 6–12)
RBC # BLD AUTO: 4.77 10*6/MM3 (ref 3.77–5.28)
VIT B12 BLD-MCNC: 234 PG/ML (ref 211–946)
WBC # BLD AUTO: 6.33 10*3/MM3 (ref 3.4–10.8)

## 2021-09-13 PROCEDURE — 82043 UR ALBUMIN QUANTITATIVE: CPT

## 2021-09-13 PROCEDURE — G0439 PPPS, SUBSEQ VISIT: HCPCS | Performed by: PHYSICIAN ASSISTANT

## 2021-09-13 PROCEDURE — 82306 VITAMIN D 25 HYDROXY: CPT

## 2021-09-13 PROCEDURE — 82607 VITAMIN B-12: CPT

## 2021-09-13 PROCEDURE — 80061 LIPID PANEL: CPT

## 2021-09-13 PROCEDURE — 84439 ASSAY OF FREE THYROXINE: CPT

## 2021-09-13 PROCEDURE — 85025 COMPLETE CBC W/AUTO DIFF WBC: CPT

## 2021-09-13 PROCEDURE — 84443 ASSAY THYROID STIM HORMONE: CPT

## 2021-09-13 PROCEDURE — 80053 COMPREHEN METABOLIC PANEL: CPT

## 2021-09-13 RX ORDER — LOSARTAN POTASSIUM 25 MG/1
25 TABLET ORAL DAILY
Qty: 30 TABLET | Refills: 11 | Status: SHIPPED | OUTPATIENT
Start: 2021-09-13 | End: 2022-09-06

## 2021-09-13 NOTE — PATIENT INSTRUCTIONS
Medicare Wellness  Personal Prevention Plan of Service     Date of Office Visit:  2021  Encounter Provider:  Deja Benoit PA-C  Place of Service:  [facility]  Patient Name: Heidi Youssef  :  1940    As part of the Medicare Wellness portion of your visit today, we are providing you with this personalized preventive plan of services (PPPS). This plan is based upon recommendations of the [place] Preventive Services Task Force (USPSTF) and the Advisory Committee on Immunization Practices (ACIP).    This lists the preventive care services that should be considered, and provides dates of when you are due. Items listed as completed are up-to-date and do not require any further intervention.    Health Maintenance   Topic Date Due   • ZOSTER VACCINE (1 of 2) Never done   • DXA SCAN  2018   • HEPATITIS C SCREENING  Never done   • HEMOGLOBIN A1C  Never done   • DIABETIC EYE EXAM  2018   • ANNUAL WELLNESS VISIT  09/10/2021   • LIPID PANEL  09/10/2021   • URINE MICROALBUMIN  09/10/2021   • INFLUENZA VACCINE  10/01/2021   • TDAP/TD VACCINES (2 - Td or Tdap) 10/25/2022   • COVID-19 Vaccine  Completed   • Pneumococcal Vaccine 65+  Completed       Orders Placed This Encounter   Procedures   • Comprehensive Metabolic Panel     Standing Status:   Future     Standing Expiration Date:   2022     Order Specific Question:   Release to patient     Answer:   Immediate   • Lipid Panel     Standing Status:   Future     Standing Expiration Date:   2022   • TSH     Standing Status:   Future     Standing Expiration Date:   2022     Order Specific Question:   Release to patient     Answer:   Immediate   • T4, Free     Standing Status:   Future     Standing Expiration Date:   2022     Order Specific Question:   Release to patient     Answer:   Immediate   • Vitamin B12     Standing Status:   Future     Standing Expiration Date:   2022     Order  Specific Question:   Release to patient     Answer:   Immediate   • Vitamin D 25 Hydroxy     Standing Status:   Future     Standing Expiration Date:   9/13/2022     Order Specific Question:   Release to patient     Answer:   Immediate   • MicroAlbumin, Urine, Random - Urine, Clean Catch     Standing Status:   Future     Standing Expiration Date:   9/13/2022     Order Specific Question:   Release to patient     Answer:   Immediate   • CBC & Differential     Standing Status:   Future     Standing Expiration Date:   9/13/2022     Order Specific Question:   Manual Differential     Answer:   No       No follow-ups on file.          Advance Directive    Advance directives are legal documents that let you make choices ahead of time about your health care and medical treatment in case you become unable to communicate for yourself. Advance directives are a way for you to make known your wishes to family, friends, and health care providers. This can let others know about your end-of-life care if you become unable to communicate.  Discussing and writing advance directives should happen over time rather than all at once. Advance directives can be changed depending on your situation and what you want, even after you have signed the advance directives.  There are different types of advance directives, such as:  · Medical power of .  · Living will.  · Do not resuscitate (DNR) or do not attempt resuscitation (DNAR) order.  Health care proxy and medical power of   A health care proxy is also called a health care agent. This is a person who is appointed to make medical decisions for you in cases where you are unable to make the decisions yourself. Generally, people choose someone they know well and trust to represent their preferences. Make sure to ask this person for an agreement to act as your proxy. A proxy may have to exercise judgment in the event of a medical decision for which your wishes are not known.  A  medical power of  is a legal document that names your health care proxy. Depending on the laws in your state, after the document is written, it may also need to be:  · Signed.  · Notarized.  · Dated.  · Copied.  · Witnessed.  · Incorporated into your medical record.  You may also want to appoint someone to manage your money in a situation in which you are unable to do so. This is called a durable power of  for finances. It is a separate legal document from the durable power of  for health care. You may choose the same person or someone different from your health care proxy to act as your agent in money matters.  If you do not appoint a proxy, or if there is a concern that the proxy is not acting in your best interests, a court may appoint a guardian to act on your behalf.  Living will  A living will is a set of instructions that state your wishes about medical care when you cannot express them yourself. Health care providers should keep a copy of your living will in your medical record. You may want to give a copy to family members or friends. To alert caregivers in case of an emergency, you can place a card in your wallet to let them know that you have a living will and where they can find it. A living will is used if you become:  · Terminally ill.  · Disabled.  · Unable to communicate or make decisions.  Items to consider in your living will include:  · To use or not to use life-support equipment, such as dialysis machines and breathing machines (ventilators).  · A DNR or DNAR order. This tells health care providers not to use cardiopulmonary resuscitation (CPR) if breathing or heartbeat stops.  · To use or not to use tube feeding.  · To be given or not to be given food and fluids.  · Comfort (palliative) care when the goal becomes comfort rather than a cure.  · Donation of organs and tissues.  A living will does not give instructions for distributing your money and property if you should  pass away.  DNR or DNAR  A DNR or DNAR order is a request not to have CPR in the event that your heart stops beating or you stop breathing. If a DNR or DNAR order has not been made and shared, a health care provider will try to help any patient whose heart has stopped or who has stopped breathing. If you plan to have surgery, talk with your health care provider about how your DNR or DNAR order will be followed if problems occur.  What if I do not have an advance directive?  If you do not have an advance directive, some states assign family decision makers to act on your behalf based on how closely you are related to them. Each state has its own laws about advance directives. You may want to check with your health care provider, , or state representative about the laws in your state.  Summary  · Advance directives are the legal documents that allow you to make choices ahead of time about your health care and medical treatment in case you become unable to tell others about your care.  · The process of discussing and writing advance directives should happen over time. You can change the advance directives, even after you have signed them.  · Advance directives include DNR or DNAR orders, living orr, and designating an agent as your medical power of .  This information is not intended to replace advice given to you by your health care provider. Make sure you discuss any questions you have with your health care provider.  Document Revised: 01/28/2021 Document Reviewed: 07/16/2020  [employer] Patient Education © 2021 [employer]      BMI for Adults  What is BMI?  Body mass index (BMI) is a number that is calculated from a person's weight and height. BMI can help estimate how much of a person's weight is composed of fat. BMI does not measure body fat directly. Rather, it is an alternative to procedures that directly measure body fat, which can be difficult and expensive.  BMI can help identify people who  "may be at higher risk for certain medical problems.  What are BMI measurements used for?  BMI is used as a screening tool to identify possible weight problems. It helps determine whether a person is obese, overweight, a healthy weight, or underweight.  BMI is useful for:  · Identifying a weight problem that may be related to a medical condition or may increase the risk for medical problems.  · Promoting changes, such as changes in diet and exercise, to help reach a healthy weight. BMI screening can be repeated to see if these changes are working.  How is BMI calculated?  BMI involves measuring your weight in relation to your height. Both height and weight are measured, and the BMI is calculated from those numbers. This can be done either in English (U.S[place] or metric measurements. Note that charts and online BMI calculators are available to help you find your BMI quickly and easily without having to do these calculations yourself.  To calculate your BMI in English (U.S.) measurements:    1. Measure your weight in pounds (lb).  2. Multiply the number of pounds by 703.  ? For example, for a person who weighs 180 lb, multiply that number by 703, which equals 126,540.  3. Measure your height in inches. Then multiply that number by itself to get a measurement called \"inches squared.\"  ? For example, for a person who is 70 inches tall, the \"inches squared\" measurement is 70 inches x 70 inches, which equals 4,900 inches squared.  4. Divide the total from step 2 (number of lb x 703) by the total from step 3 (inches squared): 126,540 ÷ 4,900 = 25.8. This is your BMI.    To calculate your BMI in metric measurements:  1. Measure your weight in kilograms (kg).  2. Measure your height in meters (m). Then multiply that number by itself to get a measurement called \"meters squared.\"  ? For example, for a person who is 1.75 m tall, the \"meters squared\" measurement is 1.75 m x 1.75 m, which is equal to 3.1 meters squared.  3. Divide " the number of kilograms (your weight) by the meters squared number. In this example: 70 ÷ 3.1 = 22.6. This is your BMI.  What do the results mean?  BMI charts are used to identify whether you are underweight, normal weight, overweight, or obese. The following guidelines will be used:  · Underweight: BMI less than 18.5.  · Normal weight: BMI between 18.5 and 24.9.  · Overweight: BMI between 25 and 29.9.  · Obese: BMI of 30 or above.  Keep these notes in mind:  · Weight includes both fat and muscle, so someone with a muscular build, such as an athlete, may have a BMI that is higher than 24.9. In cases like these, BMI is not an accurate measure of body fat.  · To determine if excess body fat is the cause of a BMI of 25 or higher, further assessments may need to be done by a health care provider.  · BMI is usually interpreted in the same way for men and women.  Where to find more information  For more information about BMI, including tools to quickly calculate your BMI, go to these websites:  · Centers for Disease Control and Prevention: www.cdc.gov  · American Heart Association: www.heart.org  · National Heart, Lung, and Blood [place]: www.nhlbi.nih.gov  Summary  · Body mass index (BMI) is a number that is calculated from a person's weight and height.  · BMI may help estimate how much of a person's weight is composed of fat. BMI can help identify those who may be at higher risk for certain medical problems.  · BMI can be measured using English measurements or metric measurements.  · BMI charts are used to identify whether you are underweight, normal weight, overweight, or obese.  This information is not intended to replace advice given to you by your health care provider. Make sure you discuss any questions you have with your health care provider.  Document Revised: 09/09/2020 Document Reviewed: 07/17/2020  [employer] Patient Education © 2021 [employer]      Medicare Wellness  Personal Prevention Plan of Service      Date of Office Visit:  2021  Encounter Provider:  Deja Benoit PA-C  Place of Service:  [facility]  Patient Name: Heidi Youssef  :  1940    As part of the Medicare Wellness portion of your visit today, we are providing you with this personalized preventive plan of services (PPPS). This plan is based upon recommendations of the [place] Preventive Services Task Force (USPSTF) and the Advisory Committee on Immunization Practices (ACIP).    This lists the preventive care services that should be considered, and provides dates of when you are due. Items listed as completed are up-to-date and do not require any further intervention.    Health Maintenance   Topic Date Due   • ZOSTER VACCINE (1 of 2) Never done   • DXA SCAN  2018   • HEPATITIS C SCREENING  Never done   • HEMOGLOBIN A1C  Never done   • DIABETIC EYE EXAM  2018   • ANNUAL WELLNESS VISIT  09/10/2021   • INFLUENZA VACCINE  10/01/2021   • LIPID PANEL  2022   • URINE MICROALBUMIN  2022   • TDAP/TD VACCINES (2 - Td or Tdap) 10/25/2022   • COVID-19 Vaccine  Completed   • Pneumococcal Vaccine 65+  Completed       Orders Placed This Encounter   Procedures   • Comprehensive Metabolic Panel     Standing Status:   Future     Number of Occurrences:   1     Standing Expiration Date:   2022     Order Specific Question:   Release to patient     Answer:   Immediate   • Lipid Panel     Standing Status:   Future     Number of Occurrences:   1     Standing Expiration Date:   2022   • TSH     Standing Status:   Future     Number of Occurrences:   1     Standing Expiration Date:   2022     Order Specific Question:   Release to patient     Answer:   Immediate   • T4, Free     Standing Status:   Future     Number of Occurrences:   1     Standing Expiration Date:   2022     Order Specific Question:   Release to patient     Answer:   Immediate   • Vitamin B12     Standing  Status:   Future     Number of Occurrences:   1     Standing Expiration Date:   9/13/2022     Order Specific Question:   Release to patient     Answer:   Immediate   • Vitamin D 25 Hydroxy     Standing Status:   Future     Number of Occurrences:   1     Standing Expiration Date:   9/13/2022     Order Specific Question:   Release to patient     Answer:   Immediate   • MicroAlbumin, Urine, Random - Urine, Clean Catch     Standing Status:   Future     Number of Occurrences:   1     Standing Expiration Date:   9/13/2022     Order Specific Question:   Release to patient     Answer:   Immediate   • Ambulatory Referral to Dermatology     Referral Priority:   Routine     Referral Type:   Consultation     Referral Reason:   Specialty Services Required     Requested Specialty:   Dermatology     Number of Visits Requested:   1   • Ambulatory Referral to [facility]     Referral Priority:   Routine     Referral Type:   Consultation     Referral Reason:   Specialty Services Required     Referred to Provider:   Haylie Harris APRN     Number of Visits Requested:   1   • ECG 12 Lead     This order was created via procedure documentation     Order Specific Question:   Release to patient     Answer:   Immediate   • CBC & Differential     Standing Status:   Future     Number of Occurrences:   1     Standing Expiration Date:   9/13/2022     Order Specific Question:   Manual Differential     Answer:   No       Return for NEXT SCHEDULED FOLLOW UP.          BMI for Adults  What is BMI?  Body mass index (BMI) is a number that is calculated from a person's weight and height. BMI can help estimate how much of a person's weight is composed of fat. BMI does not measure body fat directly. Rather, it is an alternative to procedures that directly measure body fat, which can be difficult and expensive.  BMI can help identify people who may be at higher risk for certain medical problems.  What are BMI  "measurements used for?  BMI is used as a screening tool to identify possible weight problems. It helps determine whether a person is obese, overweight, a healthy weight, or underweight.  BMI is useful for:  · Identifying a weight problem that may be related to a medical condition or may increase the risk for medical problems.  · Promoting changes, such as changes in diet and exercise, to help reach a healthy weight. BMI screening can be repeated to see if these changes are working.  How is BMI calculated?  BMI involves measuring your weight in relation to your height. Both height and weight are measured, and the BMI is calculated from those numbers. This can be done either in English (U.S.) or metric measurements. Note that charts and online BMI calculators are available to help you find your BMI quickly and easily without having to do these calculations yourself.  To calculate your BMI in English (U.S.) measurements:    5. Measure your weight in pounds (lb).  6. Multiply the number of pounds by 703.  ? For example, for a person who weighs 180 lb, multiply that number by 703, which equals 126,540.  7. Measure your height in inches. Then multiply that number by itself to get a measurement called \"inches squared.\"  ? For example, for a person who is 70 inches tall, the \"inches squared\" measurement is 70 inches x 70 inches, which equals 4,900 inches squared.  8. Divide the total from step 2 (number of lb x 703) by the total from step 3 (inches squared): 126,540 ÷ 4,900 = 25.8. This is your BMI.    To calculate your BMI in metric measurements:  4. Measure your weight in kilograms (kg).  5. Measure your height in meters (m). Then multiply that number by itself to get a measurement called \"meters squared.\"  ? For example, for a person who is 1.75 m tall, the \"meters squared\" measurement is 1.75 m x 1.75 m, which is equal to 3.1 meters squared.  6. Divide the number of kilograms (your weight) by the meters squared number. " In this example: 70 ÷ 3.1 = 22.6. This is your BMI.  What do the results mean?  BMI charts are used to identify whether you are underweight, normal weight, overweight, or obese. The following guidelines will be used:  · Underweight: BMI less than 18.5.  · Normal weight: BMI between 18.5 and 24.9.  · Overweight: BMI between 25 and 29.9.  · Obese: BMI of 30 or above.  Keep these notes in mind:  · Weight includes both fat and muscle, so someone with a muscular build, such as an athlete, may have a BMI that is higher than 24.9. In cases like these, BMI is not an accurate measure of body fat.  · To determine if excess body fat is the cause of a BMI of 25 or higher, further assessments may need to be done by a health care provider.  · BMI is usually interpreted in the same way for men and women.  Where to find more information  For more information about BMI, including tools to quickly calculate your BMI, go to these websites:  · Centers for Disease Control and Prevention: www.cdc.gov  · American Heart Association: www.heart.org  · National Heart, Lung, and Blood [place]: www.nhlbi.nih.gov  Summary  · Body mass index (BMI) is a number that is calculated from a person's weight and height.  · BMI may help estimate how much of a person's weight is composed of fat. BMI can help identify those who may be at higher risk for certain medical problems.  · BMI can be measured using English measurements or metric measurements.  · BMI charts are used to identify whether you are underweight, normal weight, overweight, or obese.  This information is not intended to replace advice given to you by your health care provider. Make sure you discuss any questions you have with your health care provider.  Document Revised: 09/09/2020 Document Reviewed: 07/17/2020  [employer] Patient Education © 2021 [employer]      Advance Directive    Advance directives are legal documents that let you make choices ahead of time about your health care and  medical treatment in case you become unable to communicate for yourself. Advance directives are a way for you to make known your wishes to family, friends, and health care providers. This can let others know about your end-of-life care if you become unable to communicate.  Discussing and writing advance directives should happen over time rather than all at once. Advance directives can be changed depending on your situation and what you want, even after you have signed the advance directives.  There are different types of advance directives, such as:  · Medical power of .  · Living will.  · Do not resuscitate (DNR) or do not attempt resuscitation (DNAR) order.  Health care proxy and medical power of   A health care proxy is also called a health care agent. This is a person who is appointed to make medical decisions for you in cases where you are unable to make the decisions yourself. Generally, people choose someone they know well and trust to represent their preferences. Make sure to ask this person for an agreement to act as your proxy. A proxy may have to exercise judgment in the event of a medical decision for which your wishes are not known.  A medical power of  is a legal document that names your health care proxy. Depending on the laws in your state, after the document is written, it may also need to be:  · Signed.  · Notarized.  · Dated.  · Copied.  · Witnessed.  · Incorporated into your medical record.  You may also want to appoint someone to manage your money in a situation in which you are unable to do so. This is called a durable power of  for finances. It is a separate legal document from the durable power of  for health care. You may choose the same person or someone different from your health care proxy to act as your agent in money matters.  If you do not appoint a proxy, or if there is a concern that the proxy is not acting in your best interests, a court may  appoint a guardian to act on your behalf.  Living will  A living will is a set of instructions that state your wishes about medical care when you cannot express them yourself. Health care providers should keep a copy of your living will in your medical record. You may want to give a copy to family members or friends. To alert caregivers in case of an emergency, you can place a card in your wallet to let them know that you have a living will and where they can find it. A living will is used if you become:  · Terminally ill.  · Disabled.  · Unable to communicate or make decisions.  Items to consider in your living will include:  · To use or not to use life-support equipment, such as dialysis machines and breathing machines (ventilators).  · A DNR or DNAR order. This tells health care providers not to use cardiopulmonary resuscitation (CPR) if breathing or heartbeat stops.  · To use or not to use tube feeding.  · To be given or not to be given food and fluids.  · Comfort (palliative) care when the goal becomes comfort rather than a cure.  · Donation of organs and tissues.  A living will does not give instructions for distributing your money and property if you should pass away.  DNR or DNAR  A DNR or DNAR order is a request not to have CPR in the event that your heart stops beating or you stop breathing. If a DNR or DNAR order has not been made and shared, a health care provider will try to help any patient whose heart has stopped or who has stopped breathing. If you plan to have surgery, talk with your health care provider about how your DNR or DNAR order will be followed if problems occur.  What if I do not have an advance directive?  If you do not have an advance directive, some states assign family decision makers to act on your behalf based on how closely you are related to them. Each state has its own laws about advance directives. You may want to check with your health care provider, , or state  representative about the laws in your state.  Summary  · Advance directives are the legal documents that allow you to make choices ahead of time about your health care and medical treatment in case you become unable to tell others about your care.  · The process of discussing and writing advance directives should happen over time. You can change the advance directives, even after you have signed them.  · Advance directives include DNR or DNAR orders, living orr, and designating an agent as your medical power of .  This information is not intended to replace advice given to you by your health care provider. Make sure you discuss any questions you have with your health care provider.  Document Revised: 01/28/2021 Document Reviewed: 07/16/2020  [employer] Patient Education © 2021 [employer]

## 2021-09-13 NOTE — PROGRESS NOTES
QUICK REFERENCE INFORMATION:  The ABCs of the Annual Wellness Visit    Subsequent Medicare Wellness Visit     HEALTH RISK ASSESSMENT    : 1940    Recent Hospitalizations:  No hospitalization(s) within the last year..  ccc      Current Medical Providers:  Patient Care Team:  Kyle Piedra MD as PCP - General  Kyle Piedra MD as PCP - Family Medicine  Kyle Piedra MD as Referring Physician (Internal Medicine)  Shabbir Askew MD as Consulting Physician (Ophthalmology)  Charanjit Rousseau as Consulting Physician (Orthopedic Surgery)  Aroldo Wiggins MD as Consulting Physician (Ophthalmology)  Haylie Harris APRN as Nurse Practitioner (Nurse Practitioner)        Smoking Status:  Social History     Tobacco Use   Smoking Status Never Smoker   Smokeless Tobacco Never Used       Alcohol Consumption:  Social History     Substance and Sexual Activity   Alcohol Use Yes    Comment: occasional       Depression Screen:   PHQ-2/PHQ-9 Depression Screening 2021   Little interest or pleasure in doing things 0   Feeling down, depressed, or hopeless 0   Total Score 0       Health Habits and Functional and Cognitive Screening:  Functional & Cognitive Status 2021   Do you have difficulty preparing food and eating? No   Do you have difficulty bathing yourself, getting dressed or grooming yourself? No   Do you have difficulty using the toilet? No   Do you have difficulty moving around from place to place? No   Do you have trouble with steps or getting out of a bed or a chair? No   Current Diet Well Balanced Diet   Dental Exam Up to date   Eye Exam Up to date   Exercise (times per week) 0 times per week   Current Exercises Include No Regular Exercise   Current Exercise Activities Include -   Do you need help using the phone?  No   Are you deaf or do you have serious difficulty hearing?  No   Do you need help with transportation? No   Do you need help shopping? No   Do  you need help preparing meals?  No   Do you need help with housework?  Yes   Do you need help with laundry? No   Do you need help taking your medications? No   Do you need help managing money? No   Do you ever drive or ride in a car without wearing a seat belt? No   Have you felt unusual stress, anger or loneliness in the last month? No   Who do you live with? Alone   If you need help, do you have trouble finding someone available to you? No   Have you been bothered in the last four weeks by sexual problems? No   Do you have difficulty concentrating, remembering or making decisions? No         Does the patient have evidence of cognitive impairment? No    Asiprin use counseling: Does not need ASA (and currently is not on it)    Recent Lab Results:  Lab on 09/13/2021   Component Date Value Ref Range Status   • Glucose 09/13/2021 97  65 - 99 mg/dL Final   • BUN 09/13/2021 14  8 - 23 mg/dL Final   • Creatinine 09/13/2021 0.63  0.57 - 1.00 mg/dL Final   • Sodium 09/13/2021 139  136 - 145 mmol/L Final   • Potassium 09/13/2021 4.6  3.5 - 5.2 mmol/L Final   • Chloride 09/13/2021 106  98 - 107 mmol/L Final   • CO2 09/13/2021 23.0  22.0 - 29.0 mmol/L Final   • Calcium 09/13/2021 9.5  8.6 - 10.5 mg/dL Final   • Total Protein 09/13/2021 7.6  6.0 - 8.5 g/dL Final   • Albumin 09/13/2021 4.20  3.50 - 5.20 g/dL Final   • ALT (SGPT) 09/13/2021 14  1 - 33 U/L Final   • AST (SGOT) 09/13/2021 20  1 - 32 U/L Final   • Alkaline Phosphatase 09/13/2021 89  39 - 117 U/L Final   • Total Bilirubin 09/13/2021 0.4  0.0 - 1.2 mg/dL Final   • eGFR Non African Amer 09/13/2021 91  >60 mL/min/1.73 Final   • Globulin 09/13/2021 3.4  gm/dL Final   • A/G Ratio 09/13/2021 1.2  g/dL Final   • BUN/Creatinine Ratio 09/13/2021 22.2  7.0 - 25.0 Final   • Anion Gap 09/13/2021 10.0  5.0 - 15.0 mmol/L Final   • Total Cholesterol 09/13/2021 215* 0 - 200 mg/dL Final   • Triglycerides 09/13/2021 185* 0 - 150 mg/dL Final   • HDL Cholesterol 09/13/2021 34* 40 - 60  mg/dL Final   • LDL Cholesterol  09/13/2021 147* 0 - 100 mg/dL Final   • VLDL Cholesterol 09/13/2021 34  5 - 40 mg/dL Final   • LDL/HDL Ratio 09/13/2021 4.24   Final   • TSH 09/13/2021 2.800  0.270 - 4.200 uIU/mL Final   • Free T4 09/13/2021 1.14  0.93 - 1.70 ng/dL Final   • Vitamin B-12 09/13/2021 234  211 - 946 pg/mL Final   • 25 Hydroxy, Vitamin D 09/13/2021 49.3  ng/ml Final   • Microalbumin, Urine 09/13/2021 7.7  mg/dL Final   • WBC 09/13/2021 6.33  3.40 - 10.80 10*3/mm3 Final   • RBC 09/13/2021 4.77  3.77 - 5.28 10*6/mm3 Final   • Hemoglobin 09/13/2021 14.1  12.0 - 15.9 g/dL Final   • Hematocrit 09/13/2021 44.6  34.0 - 46.6 % Final   • MCV 09/13/2021 93.5  79.0 - 97.0 fL Final   • MCH 09/13/2021 29.6  26.6 - 33.0 pg Final   • MCHC 09/13/2021 31.6  31.5 - 35.7 g/dL Final   • RDW 09/13/2021 13.1  12.3 - 15.4 % Final   • RDW-SD 09/13/2021 44.8  37.0 - 54.0 fl Final   • MPV 09/13/2021 10.1  6.0 - 12.0 fL Final   • Platelets 09/13/2021 351  140 - 450 10*3/mm3 Final   • Neutrophil % 09/13/2021 56.2  42.7 - 76.0 % Final   • Lymphocyte % 09/13/2021 32.9  19.6 - 45.3 % Final   • Monocyte % 09/13/2021 8.1  5.0 - 12.0 % Final   • Eosinophil % 09/13/2021 1.3  0.3 - 6.2 % Final   • Basophil % 09/13/2021 1.3  0.0 - 1.5 % Final   • Immature Grans % 09/13/2021 0.2  0.0 - 0.5 % Final   • Neutrophils, Absolute 09/13/2021 3.57  1.70 - 7.00 10*3/mm3 Final   • Lymphocytes, Absolute 09/13/2021 2.08  0.70 - 3.10 10*3/mm3 Final   • Monocytes, Absolute 09/13/2021 0.51  0.10 - 0.90 10*3/mm3 Final   • Eosinophils, Absolute 09/13/2021 0.08  0.00 - 0.40 10*3/mm3 Final   • Basophils, Absolute 09/13/2021 0.08  0.00 - 0.20 10*3/mm3 Final   • Immature Grans, Absolute 09/13/2021 0.01  0.00 - 0.05 10*3/mm3 Final   • nRBC 09/13/2021 0.0  0.0 - 0.2 /100 WBC Final       Imaging:  No radiology results for the last 30 days.    Age-appropriate Screening Schedule:  Refer to the list below for future screening recommendations based on patient's age,  sex and/or medical conditions. Orders for these recommended tests are listed in the plan section. The patient has been provided with a written plan.    Health Maintenance   Topic Date Due   • ZOSTER VACCINE (1 of 2) Never done   • DXA SCAN  06/01/2018   • HEMOGLOBIN A1C  Never done   • DIABETIC EYE EXAM  11/28/2018   • INFLUENZA VACCINE  10/01/2021   • LIPID PANEL  09/13/2022   • URINE MICROALBUMIN  09/13/2022   • TDAP/TD VACCINES (2 - Td or Tdap) 10/25/2022        Subjective   History of Present Illness    Heidi Youssef is a 80 y.o. female who presents for an Annual Wellness Visit.  passed away in 2012.  Lives alone.  Three daughters, two live in [place]. Keep sin touch with friends via text and phone.     The following portions of the patient's history were reviewed and updated as appropriate: allergies, current medications, past family history, past medical history, past social history, past surgical history and problem list.    Outpatient Medications Prior to Visit   Medication Sig Dispense Refill   • acetaminophen (TYLENOL) 650 MG 8 hr tablet Take 650 mg by mouth Every 12 (Twelve) Hours As Needed for Moderate Pain .     • Cholecalciferol (VITAMIN D) 2000 units capsule Take 2,000 Units by mouth Daily.     • Histamine Dihydrochloride (AUSTRALIAN DREAM ARTHRITIS EX) Apply  topically every night at bedtime.     • Misc Natural Products (GLUCOSAMINE CHONDROITIN TRIPLE) tablet Take 2 tablets by mouth Daily.     • Multiple Vitamins-Minerals (PRESERVISION AREDS 2+MULTI VIT) capsule Take 2 capsules by mouth Daily.     • losartan (COZAAR) 25 MG tablet Take 1 tablet by mouth Daily. 30 tablet 11   • cephalexin (Keflex) 500 MG capsule Take 1 capsule by mouth 3 (Three) Times a Day. 21 capsule 0   • metoprolol tartrate (LOPRESSOR) 25 MG tablet TAKE 1 TABLET BY MOUTH TWICE DAILY 60 tablet 5     No facility-administered medications prior to visit.       Patient Active Problem List   Diagnosis   • Alcohol use   • Benign  essential hypertension   • Paresthesias in right hand   • Paresthesias in left hand   • UTI (urinary tract infection)   • Mixed hyperlipidemia   • Sebaceous cyst   • Osteoarthritis   • Obesity   • Annual physical exam   • Chronic bilateral low back pain with right-sided sciatica   • Neck pain   • Breast cancer screening by mammogram   • Fatigue   • Medicare annual wellness visit, subsequent   • Background diabetic retinopathy of left eye determined by examination (CMS/[facility])   • Irregular heartbeat   • Vitamin D deficiency       Advance Care Planning:  ACP discussion was held with the patient during this visit. Patient has an advance directive in EMR which is still valid.     Identification of Risk Factors:  Risk factors include: Advance Directive Discussion.    Review of Systems   Constitutional: Negative for activity change, appetite change, diaphoresis, fatigue, unexpected weight gain and unexpected weight loss.   HENT: Negative for hearing loss.    Eyes: Negative for visual disturbance.   Respiratory: Negative for chest tightness and shortness of breath.    Cardiovascular: Positive for palpitations. Negative for chest pain and leg swelling.   Gastrointestinal: Negative for abdominal pain, blood in stool, GERD and indigestion.   Endocrine: Negative for cold intolerance and heat intolerance.   Genitourinary: Negative for dysuria and hematuria.   Musculoskeletal: Negative for arthralgias and myalgias.   Skin: Positive for color change. Negative for skin lesions.   Neurological: Negative for tremors, seizures, syncope, speech difficulty, weakness, headache, memory problem and confusion.   Hematological: Does not bruise/bleed easily.   Psychiatric/Behavioral: Negative for sleep disturbance and depressed mood. The patient is not nervous/anxious.        Compared to one year ago, the patient feels her physical health is the same.  Compared to one year ago, the patient feels her mental health is the same.    Objective    "  Physical Exam  Vitals and nursing note reviewed.   Constitutional:       Appearance: Normal appearance.   HENT:      Head: Normocephalic and atraumatic.      Nose: Nose normal.   Cardiovascular:      Rate and Rhythm: Normal rate and regular rhythm.   Skin:     General: Skin is warm and dry.      Comments: No detectable rash on scalp   Neurological:      General: No focal deficit present.      Mental Status: She is alert. Mental status is at baseline.   Psychiatric:         Mood and Affect: Mood normal.         Behavior: Behavior normal.         Thought Content: Thought content normal.         Judgment: Judgment normal.         Vitals:    09/13/21 0956   BP: 128/70   BP Location: Left arm   Patient Position: Sitting   Cuff Size: Adult   Pulse: 72   Resp: 16   Temp: 98 °F (36.7 °C)   TempSrc: Infrared   SpO2: 98%   Weight: 79.5 kg (175 lb 3.2 oz)   Height: 160 cm (63\")   PainSc: 0-No pain       ECG 12 Lead    Date/Time: 9/14/2021 5:38 AM  Performed by: Deja Benoit PA-C  Authorized by: Deja Benoit PA-C   Comparison: not compared with previous ECG   Rhythm: sinus rhythm and sinus arrhythmia  BPM: 69    Clinical impression: normal ECG  Comments: Sinus rhythm with sinus arrhythmia          Patient's Body mass index is 31.04 kg/m². indicating that she is obese (BMI >30). Obesity-related health conditions include the following: none. Obesity is unchanged. BMI is is above average; BMI management plan is completed. We discussed low calorie, low carb based diet program, portion control and increasing exercise..      Assessment/Plan   Patient Self-Management and Personalized Health Advice  The patient has been provided with information about: weight management and preventive services including:   · Annual Wellness Visit (AWV).    Problem List Items Addressed This Visit        Cardiac and Vasculature    Benign essential hypertension    Overview     Continue losartan 25mg daily.         Relevant " Medications    losartan (COZAAR) 25 MG tablet    Other Relevant Orders    CBC & Differential (Completed)    Comprehensive Metabolic Panel (Completed)    Ambulatory Referral to [facility]    Mixed hyperlipidemia    Overview     Check lipid panel today.          Relevant Orders    Lipid Panel (Completed)    MicroAlbumin, Urine, Random - Urine, Clean Catch (Completed)    Irregular heartbeat    Overview     Discontinued metoprolol due to hair loss.  Increase in palpitations.          Relevant Orders    TSH (Completed)    T4, Free (Completed)    Ambulatory Referral to [facility]       Endocrine and Metabolic    Obesity    Overview     Body mass index is 31.04 kg/m². Discussed importance of weight loss.  Recommend low fat, low calorie diet.  Recommend daily cardiovascular exercise. Goal is to lose 2 lbs in one month.            Vitamin D deficiency    Relevant Orders    Vitamin D 25 Hydroxy (Completed)       Eye    Background diabetic retinopathy of left eye determined by examination (CMS/[facility])    Overview     Sees Feliciano Foster            Health Encounters    Medicare annual wellness visit, subsequent - Primary    Overview     Current on immunizations. Current on age recommended screenings.  Fasting labs today.            Skin    Sebaceous cyst    Relevant Medications    Histamine Dihydrochloride (AUSTRALIAN DREAM ARTHRITIS EX)    Other Relevant Orders    Ambulatory Referral to Dermatology (Completed)      Other Visit Diagnoses     Paresthesia        Relevant Orders    Vitamin B12 (Completed)          Reviewed use of high risk medication in the elderly: yes  Reviewed for potential of harmful drug interactions in the elderly: yes    Follow Up:  Return for NEXT SCHEDULED FOLLOW UP.     An After Visit Summary and PPPS with all of these plans were given to the patient.

## 2021-09-13 NOTE — PROGRESS NOTES
"The ABCs of the Annual Wellness Visit  Subsequent Medicare Wellness Visit    Chief Complaint   Patient presents with   • Medicare Wellness-subsequent   • Hypertension     f/u   • Hyperlipidemia   • Skin Problem     scalp 06/21       Subjective    History of Present Illness:  Heidi Youssef is a 80 y.o. female who presents for a Subsequent Medicare Wellness Visit.    The following portions of the patient's history were reviewed and   updated as appropriate: {history reviewed:20406::\"allergies\",\"current medications\",\"past family history\",\"past medical history\",\"past social history\",\"past surgical history\",\"problem list\"}.    Compared to one year ago, the patient feels her physical   health is {better worse same:92237}.    Compared to one year ago, the patient feels her mental   health is {better worse same:41982}.    Recent Hospitalizations:  {Hospital Admission Status in the last 365 days:60055}      Current Medical Providers:  Patient Care Team:  Kyle Piedra MD as PCP - General  Kyle Piedra MD as PCP - Family Medicine  Kyle Piedra MD as Referring Physician (Internal Medicine)  Shabbir Askew MD as Consulting Physician (Ophthalmology)  Charanjit Rousseau as Consulting Physician (Orthopedic Surgery)    Outpatient Medications Prior to Visit   Medication Sig Dispense Refill   • acetaminophen (TYLENOL) 650 MG 8 hr tablet Take 650 mg by mouth Every 12 (Twelve) Hours As Needed for Moderate Pain .     • Cholecalciferol (VITAMIN D) 2000 units capsule Take 2,000 Units by mouth Daily.     • Histamine Dihydrochloride (AUSTRALIAN DREAM ARTHRITIS EX) Apply  topically every night at bedtime.     • losartan (COZAAR) 25 MG tablet Take 1 tablet by mouth Daily. 30 tablet 11   • Misc Natural Products (GLUCOSAMINE CHONDROITIN TRIPLE) tablet Take 2 tablets by mouth Daily.     • Multiple Vitamins-Minerals (PRESERVISION AREDS 2+MULTI VIT) capsule Take 2 capsules by mouth Daily.     • " cephalexin (Keflex) 500 MG capsule Take 1 capsule by mouth 3 (Three) Times a Day. 21 capsule 0   • metoprolol tartrate (LOPRESSOR) 25 MG tablet TAKE 1 TABLET BY MOUTH TWICE DAILY 60 tablet 5     No facility-administered medications prior to visit.       No opioid medication identified on active medication list. I have reviewed chart for other potential  high risk medication/s and harmful drug interactions in the elderly.          Aspirin is not on active medication list.  {ASPIRIN NOT ON MEDICATION LIST INDICATED/NOT INDICATED:34976}.    Patient Active Problem List   Diagnosis   • Alcohol use   • Benign essential hypertension   • Paresthesias in right hand   • Paresthesias in left hand   • UTI (urinary tract infection)   • Mixed hyperlipidemia   • Sebaceous cyst   • Osteoarthritis   • Obesity   • Annual physical exam   • Chronic bilateral low back pain with right-sided sciatica   • Neck pain   • Breast cancer screening by mammogram   • Fatigue   • Medicare annual wellness visit, subsequent     Advance Care Planning  Advance Directive is not on file.  {ACP Discussion, Advance Directive not in EMR:40489}          Objective    There were no vitals filed for this visit.  There is no height or weight on file to calculate BMI.  BMI has not been calculated during today's encounter.     Does the patient have evidence of cognitive impairment? {Yes/No:44394}    Physical Exam            HEALTH RISK ASSESSMENT    Smoking Status:  Social History     Tobacco Use   Smoking Status Never Smoker   Smokeless Tobacco Never Used     Alcohol Consumption:  Social History     Substance and Sexual Activity   Alcohol Use Yes    Comment: occasional     Fall Risk Screen:    AMALIAADI Fall Risk Assessment was completed, and patient is at LOW risk for falls.Assessment completed on:9/13/2021    Depression Screening:  PHQ-2/PHQ-9 Depression Screening 9/13/2021   Little interest or pleasure in doing things 0   Feeling down, depressed, or hopeless 0    Total Score 0       Health Habits and Functional and Cognitive Screening:  Functional & Cognitive Status 9/13/2021   Do you have difficulty preparing food and eating? No   Do you have difficulty bathing yourself, getting dressed or grooming yourself? No   Do you have difficulty using the toilet? No   Do you have difficulty moving around from place to place? No   Do you have trouble with steps or getting out of a bed or a chair? No   Current Diet Well Balanced Diet   Dental Exam Up to date   Eye Exam Up to date   Exercise (times per week) 0 times per week   Current Exercises Include No Regular Exercise   Current Exercise Activities Include -   Do you need help using the phone?  No   Are you deaf or do you have serious difficulty hearing?  No   Do you need help with transportation? No   Do you need help shopping? No   Do you need help preparing meals?  No   Do you need help with housework?  Yes   Do you need help with laundry? No   Do you need help taking your medications? No   Do you need help managing money? No   Do you ever drive or ride in a car without wearing a seat belt? No   Have you felt unusual stress, anger or loneliness in the last month? No   Who do you live with? Alone   If you need help, do you have trouble finding someone available to you? No   Have you been bothered in the last four weeks by sexual problems? No   Do you have difficulty concentrating, remembering or making decisions? No       Age-appropriate Screening Schedule:  Refer to the list below for future screening recommendations based on patient's age, sex and/or medical conditions. Orders for these recommended tests are listed in the plan section. The patient has been provided with a written plan.    Health Maintenance   Topic Date Due   • ZOSTER VACCINE (1 of 2) Never done   • DXA SCAN  06/01/2018   • HEMOGLOBIN A1C  Never done   • DIABETIC EYE EXAM  11/28/2018   • LIPID PANEL  09/10/2021   • URINE MICROALBUMIN  09/10/2021   • INFLUENZA  VACCINE  10/01/2021   • TDAP/TD VACCINES (2 - Td or Tdap) 10/25/2022              Assessment/Plan   CMS Preventative Services Quick Reference  Risk Factors Identified During Encounter  {Medicare Wellness Risk Factors:89785}  The above risks/problems have been discussed with the patient.  Follow up actions/plans if indicated are seen below in the Assessment/Plan Section.  Pertinent information has been shared with the patient in the After Visit Summary.    There are no diagnoses linked to this encounter.    Follow Up:   No follow-ups on file.     An After Visit Summary and PPPS were made available to the patient.    {Optional Chart Navigation Links Wrapup  Review (Popup)  Advance Care Planning  Labs  CC  Problem List  Visit Diagnosis  Medications  Result Review  Imaging  Health Maintenance  Quality  BestPractice  SmartSets  SnapShot  Encounters  Notes  Media  Procedures :23}    {Time Spent-Use for E/M Coding (Optional):98921}

## 2021-09-14 PROBLEM — I49.9 IRREGULAR HEARTBEAT: Status: ACTIVE | Noted: 2021-09-14

## 2021-09-14 PROBLEM — E55.9 VITAMIN D DEFICIENCY: Status: ACTIVE | Noted: 2021-09-14

## 2021-09-14 PROBLEM — E11.3292: Status: ACTIVE | Noted: 2021-09-14

## 2021-09-14 LAB
ALBUMIN SERPL-MCNC: 4.2 G/DL (ref 3.5–5.2)
ALBUMIN UR-MCNC: 7.7 MG/DL
ALBUMIN/GLOB SERPL: 1.2 G/DL
ALP SERPL-CCNC: 89 U/L (ref 39–117)
ALT SERPL W P-5'-P-CCNC: 14 U/L (ref 1–33)
ANION GAP SERPL CALCULATED.3IONS-SCNC: 10 MMOL/L (ref 5–15)
AST SERPL-CCNC: 20 U/L (ref 1–32)
BILIRUB SERPL-MCNC: 0.4 MG/DL (ref 0–1.2)
BUN SERPL-MCNC: 14 MG/DL (ref 8–23)
BUN/CREAT SERPL: 22.2 (ref 7–25)
CALCIUM SPEC-SCNC: 9.5 MG/DL (ref 8.6–10.5)
CHLORIDE SERPL-SCNC: 106 MMOL/L (ref 98–107)
CHOLEST SERPL-MCNC: 215 MG/DL (ref 0–200)
CO2 SERPL-SCNC: 23 MMOL/L (ref 22–29)
CREAT SERPL-MCNC: 0.63 MG/DL (ref 0.57–1)
GFR SERPL CREATININE-BSD FRML MDRD: 91 ML/MIN/1.73
GLOBULIN UR ELPH-MCNC: 3.4 GM/DL
GLUCOSE SERPL-MCNC: 97 MG/DL (ref 65–99)
HDLC SERPL-MCNC: 34 MG/DL (ref 40–60)
LDLC SERPL CALC-MCNC: 147 MG/DL (ref 0–100)
LDLC/HDLC SERPL: 4.24 {RATIO}
POTASSIUM SERPL-SCNC: 4.6 MMOL/L (ref 3.5–5.2)
PROT SERPL-MCNC: 7.6 G/DL (ref 6–8.5)
SODIUM SERPL-SCNC: 139 MMOL/L (ref 136–145)
T4 FREE SERPL-MCNC: 1.14 NG/DL (ref 0.93–1.7)
TRIGL SERPL-MCNC: 185 MG/DL (ref 0–150)
TSH SERPL DL<=0.05 MIU/L-ACNC: 2.8 UIU/ML (ref 0.27–4.2)
VLDLC SERPL-MCNC: 34 MG/DL (ref 5–40)

## 2021-09-14 PROCEDURE — 93000 ELECTROCARDIOGRAM COMPLETE: CPT | Performed by: PHYSICIAN ASSISTANT

## 2021-09-27 ENCOUNTER — OFFICE VISIT (OUTPATIENT)
Dept: INTERNAL MEDICINE | Facility: CLINIC | Age: 81
End: 2021-09-27

## 2021-09-27 VITALS
SYSTOLIC BLOOD PRESSURE: 128 MMHG | HEIGHT: 63 IN | DIASTOLIC BLOOD PRESSURE: 76 MMHG | BODY MASS INDEX: 31.47 KG/M2 | TEMPERATURE: 98.2 F | WEIGHT: 177.6 LBS | HEART RATE: 72 BPM

## 2021-09-27 DIAGNOSIS — E66.09 CLASS 1 OBESITY DUE TO EXCESS CALORIES WITH SERIOUS COMORBIDITY AND BODY MASS INDEX (BMI) OF 31.0 TO 31.9 IN ADULT: ICD-10-CM

## 2021-09-27 DIAGNOSIS — E78.2 MIXED HYPERLIPIDEMIA: ICD-10-CM

## 2021-09-27 DIAGNOSIS — I10 BENIGN ESSENTIAL HYPERTENSION: Primary | ICD-10-CM

## 2021-09-27 DIAGNOSIS — I49.1 PAC (PREMATURE ATRIAL CONTRACTION): ICD-10-CM

## 2021-09-27 DIAGNOSIS — E55.9 VITAMIN D DEFICIENCY: ICD-10-CM

## 2021-09-27 PROBLEM — E11.3292: Status: RESOLVED | Noted: 2021-09-14 | Resolved: 2021-09-27

## 2021-09-27 PROCEDURE — 99214 OFFICE O/P EST MOD 30 MIN: CPT | Performed by: INTERNAL MEDICINE

## 2021-09-27 PROCEDURE — G0008 ADMIN INFLUENZA VIRUS VAC: HCPCS | Performed by: INTERNAL MEDICINE

## 2021-09-27 PROCEDURE — 90662 IIV NO PRSV INCREASED AG IM: CPT | Performed by: INTERNAL MEDICINE

## 2021-09-27 NOTE — PROGRESS NOTES
[facility]     Heidi Youssef  1940   [id number]      Patient Care Team:  Kyle Piedra MD as PCP - General  Kyle Piedra MD as PCP - Family Medicine  Kyle Piedra MD as Referring Physician (Internal Medicine)  Shabbir Askew MD as Consulting Physician (Ophthalmology)  Charanjit Rousseau as Consulting Physician (Orthopedic Surgery)  Aroldo Wiggins MD as Consulting Physician (Ophthalmology)  Haylie Harris APRN as Nurse Practitioner (Nurse Practitioner)    Chief Complaint::   Chief Complaint   Patient presents with   • Hyperlipidemia   • Hypertension        HPI  Ms. Youssef is now 80.  She comes in for follow-up of her hypertension, hyperlipidemia, obesity, vitamin D deficiency and frequent PACs and PVCs.  She stopped metoprolol recently because it made her too tired.  She actually feels much better.  Overall her arrhythmias are asymptomatic.  This has been worked up thoroughly.  She continues to struggle with weight, and admits that it has been difficult reduce calories.  However overall she feels very well.  There is no fever, cough, shortness of breath or chest pain.    Chronic Conditions:      Patient Active Problem List   Diagnosis   • Alcohol use   • Benign essential hypertension   • Paresthesias in right hand   • Paresthesias in left hand   • Mixed hyperlipidemia   • Sebaceous cyst   • Osteoarthritis   • Obesity   • Annual physical exam   • Chronic bilateral low back pain with right-sided sciatica   • Neck pain   • Fatigue   • Medicare annual wellness visit, subsequent   • Irregular heartbeat   • Vitamin D deficiency   • PAC (premature atrial contraction)        Past Medical History:   Diagnosis Date   • Arthritis    • Background diabetic retinopathy of left eye determined by examination (CMS/[facility]) 9/14/2021   • Breast injury    • Hyperlipidemia    • Hypertension    • 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   • Ocular histoplasmosis  1992   • Osteoarthritis        Past Surgical History:   Procedure Laterality Date   • CARPAL TUNNEL RELEASE  12/16/2015    Right   • JOINT REPLACEMENT      rt knee partial replacement   • REPLACEMENT TOTAL KNEE  2014   • TONSILLECTOMY  1952   • TUBAL ABDOMINAL LIGATION Bilateral 1975       Family History   Problem Relation Age of Onset   • Breast cancer Maternal Aunt 45   • Arthritis Mother         OA   • Hypertension Mother    • Diabetes Mother    • Heart attack Mother 75   • Coronary artery disease Father 49   • Diabetes Maternal Uncle    • Atrial fibrillation Brother         Late 60s s/p ablation       Social History     Socioeconomic History   • Marital status:      Spouse name: Not on file   • Number of children: Not on file   • Years of education: Not on file   • Highest education level: Not on file   Tobacco Use   • Smoking status: Never Smoker   • Smokeless tobacco: Never Used   Substance and Sexual Activity   • Alcohol use: Yes     Comment: occasional   • Drug use: No   • Sexual activity: Defer       Allergies   Allergen Reactions   • Bactrim [Sulfamethoxazole-Trimethoprim] Other (See Comments)     Severe joint pain   • Aspirin Palpitations   • Nsaids Palpitations         Current Outpatient Medications:   •  acetaminophen (TYLENOL) 650 MG 8 hr tablet, Take 650 mg by mouth Every 12 (Twelve) Hours As Needed for Moderate Pain ., Disp: , Rfl:   •  Histamine Dihydrochloride (AUSTRALIAN DREAM ARTHRITIS EX), Apply  topically every night at bedtime., Disp: , Rfl:   •  losartan (COZAAR) 25 MG tablet, Take 1 tablet by mouth Daily., Disp: 30 tablet, Rfl: 11  •  Misc Natural Products (GLUCOSAMINE CHONDROITIN TRIPLE) tablet, Take 2 tablets by mouth Daily., Disp: , Rfl:   •  Multiple Vitamins-Minerals (PRESERVISION AREDS 2+MULTI VIT) capsule, Take 2 capsules by mouth Daily., Disp: , Rfl:     Review of Systems   Constitutional: Negative.    Respiratory: Negative.  Negative for chest tightness and shortness of  "breath.    Cardiovascular: Negative.  Negative for chest pain.   Gastrointestinal: Negative for abdominal pain, blood in stool, constipation and diarrhea.        Vital Signs  Vitals:    09/27/21 1253   BP: 128/76   BP Location: Left arm   Patient Position: Sitting   Cuff Size: Adult   Pulse: 72  Comment: irregular   Temp: 98.2 °F (36.8 °C)   Weight: 80.6 kg (177 lb 9.6 oz)   Height: 160 cm (62.99\")   PainSc: 0-No pain       Physical Exam  Vitals reviewed.   Constitutional:       Appearance: She is well-developed. She is obese.   HENT:      Head: Normocephalic and atraumatic.      Right Ear: Tympanic membrane and ear canal normal.      Left Ear: Tympanic membrane and ear canal normal.   Cardiovascular:      Rate and Rhythm: Normal rate and regular rhythm.      Heart sounds: Normal heart sounds. No murmur heard.     Pulmonary:      Effort: Pulmonary effort is normal.      Breath sounds: Normal breath sounds.   Abdominal:      General: Bowel sounds are normal.      Palpations: Abdomen is soft.      Tenderness: There is no abdominal tenderness.   Musculoskeletal:      Cervical back: Normal range of motion. No tenderness.      Right lower leg: No edema.      Left lower leg: No edema.   Lymphadenopathy:      Cervical: No cervical adenopathy.   Skin:     General: Skin is warm and dry.   Neurological:      Mental Status: She is alert and oriented to person, place, and time.      Cranial Nerves: No cranial nerve deficit.      Motor: No weakness.      Gait: Gait normal.   Psychiatric:         Mood and Affect: Mood normal.         Thought Content: Thought content normal.          Procedures    ACE III MINI             Assessment/Plan:    Diagnoses and all orders for this visit:    1. Benign essential hypertension (Primary)    2. Mixed hyperlipidemia    3. Class 1 obesity due to excess calories with serious comorbidity and body mass index (BMI) of 31.0 to 31.9 in adult    4. Vitamin D deficiency    5. PAC (premature atrial " contraction)    Other orders  -     Fluzone High-Dose 65+yrs    Plan    Overall she remains in good health, she needs to improve fitness and reduce calories.  She is up-to-date on mammography and fully vaccinated against COVID-19.    Blood pressure is well controlled on losartan.    Lipids remain slightly elevated with an LDL of 147 and triglycerides of 185.  Treatment remains healthy diet and weight loss.    Patient's Body mass index is 31.47 kg/m². indicating that she is obese (BMI >30). Obesity-related health conditions include the following: hypertension and dyslipidemias. Obesity is unchanged. BMI is is above average; BMI management plan is completed. We discussed portion control, increasing exercise and joining a fitness center or start home based exercise program..    Vitamin D level is normal, she will continue over-the-counter B12 supplementation.    PACs/PVCs are benign and now minimally symptomatic without treatment.  No further work-up or cardiology follow-up is indicated.    Plan of care reviewed with patient at the conclusion of today's visit. Education was provided regarding diagnosis, management, and any prescribed or recommended OTC medications.Patient verbalizes understanding of and agreement with management plan.         Kyle Piedra MD           Answers for HPI/ROS submitted by the patient on 9/26/2021  Please describe your symptoms.: Annual checkup. PVC and PAC  issues  Have you had these symptoms before?: Yes  How long have you been having these symptoms?: Greater than 2 weeks  Please list any medications you are currently taking for this condition.: Losartan. 25 mg / day  Please describe any probable cause for these symptoms. : ???  What is the primary reason for your visit?: Other

## 2021-09-29 PROBLEM — N39.0 UTI (URINARY TRACT INFECTION): Status: RESOLVED | Noted: 2019-05-22 | Resolved: 2021-09-29

## 2021-09-29 PROBLEM — I49.1 PAC (PREMATURE ATRIAL CONTRACTION): Status: ACTIVE | Noted: 2021-09-29

## 2021-09-29 PROBLEM — Z12.31 BREAST CANCER SCREENING BY MAMMOGRAM: Status: RESOLVED | Noted: 2020-09-10 | Resolved: 2021-09-29

## 2021-11-23 ENCOUNTER — TELEPHONE (OUTPATIENT)
Dept: INTERNAL MEDICINE | Facility: CLINIC | Age: 81
End: 2021-11-23

## 2021-11-23 DIAGNOSIS — N30.00 ACUTE CYSTITIS WITHOUT HEMATURIA: Primary | ICD-10-CM

## 2021-11-23 NOTE — TELEPHONE ENCOUNTER
Caller: Heidi Youssef    Relationship: Self    Best call back number: [phone]    What orders are you requesting (i.e. lab or imaging): URINEALYSIS    In what timeframe would the patient need to come in: TODAY OR TOMORROW         Additional notes: PATIENT THINKS THAT SHE HAS A UTI SHE WOULD LIKE TO COME TO THE LAB AND HAVE A TEST DONE

## 2021-11-24 ENCOUNTER — CLINICAL SUPPORT (OUTPATIENT)
Dept: INTERNAL MEDICINE | Facility: CLINIC | Age: 81
End: 2021-11-24

## 2021-11-24 DIAGNOSIS — N30.00 ACUTE CYSTITIS WITHOUT HEMATURIA: ICD-10-CM

## 2021-11-24 DIAGNOSIS — R30.0 BURNING WITH URINATION: Primary | ICD-10-CM

## 2021-11-24 LAB
BILIRUB BLD-MCNC: NEGATIVE MG/DL
CLARITY, POC: ABNORMAL
COLOR UR: ABNORMAL
EXPIRATION DATE: ABNORMAL
GLUCOSE UR STRIP-MCNC: NEGATIVE MG/DL
KETONES UR QL: ABNORMAL
LEUKOCYTE EST, POC: ABNORMAL
Lab: ABNORMAL
NITRITE UR-MCNC: NEGATIVE MG/ML
PH UR: 5 [PH] (ref 5–8)
PROT UR STRIP-MCNC: ABNORMAL MG/DL
RBC # UR STRIP: ABNORMAL /UL
SP GR UR: 1.02 (ref 1–1.03)
UROBILINOGEN UR QL: NORMAL

## 2021-11-24 PROCEDURE — 81003 URINALYSIS AUTO W/O SCOPE: CPT | Performed by: INTERNAL MEDICINE

## 2021-11-24 PROCEDURE — 87186 SC STD MICRODIL/AGAR DIL: CPT | Performed by: INTERNAL MEDICINE

## 2021-11-24 PROCEDURE — 87086 URINE CULTURE/COLONY COUNT: CPT | Performed by: INTERNAL MEDICINE

## 2021-11-24 PROCEDURE — 87088 URINE BACTERIA CULTURE: CPT | Performed by: INTERNAL MEDICINE

## 2021-11-24 RX ORDER — NITROFURANTOIN 25; 75 MG/1; MG/1
100 CAPSULE ORAL 2 TIMES DAILY
Qty: 10 CAPSULE | Refills: 0 | Status: SHIPPED | OUTPATIENT
Start: 2021-11-24 | End: 2022-10-13

## 2021-11-26 LAB — BACTERIA SPEC AEROBE CULT: ABNORMAL

## 2021-12-22 ENCOUNTER — OFFICE VISIT (OUTPATIENT)
Dept: INTERNAL MEDICINE | Facility: CLINIC | Age: 81
End: 2021-12-22

## 2021-12-22 VITALS
BODY MASS INDEX: 31.18 KG/M2 | WEIGHT: 176 LBS | TEMPERATURE: 98.2 F | HEART RATE: 72 BPM | DIASTOLIC BLOOD PRESSURE: 74 MMHG | HEIGHT: 63 IN | SYSTOLIC BLOOD PRESSURE: 144 MMHG

## 2021-12-22 DIAGNOSIS — R35.0 FREQUENCY OF URINATION: Primary | ICD-10-CM

## 2021-12-22 DIAGNOSIS — R30.0 DYSURIA: ICD-10-CM

## 2021-12-22 LAB
BILIRUB BLD-MCNC: NEGATIVE MG/DL
CLARITY, POC: ABNORMAL
COLOR UR: YELLOW
EXPIRATION DATE: ABNORMAL
GLUCOSE UR STRIP-MCNC: NEGATIVE MG/DL
KETONES UR QL: NEGATIVE
LEUKOCYTE EST, POC: ABNORMAL
Lab: ABNORMAL
NITRITE UR-MCNC: NEGATIVE MG/ML
PH UR: 5.5 [PH] (ref 5–8)
PROT UR STRIP-MCNC: NEGATIVE MG/DL
RBC # UR STRIP: NEGATIVE /UL
SP GR UR: 1.01 (ref 1–1.03)
UROBILINOGEN UR QL: NORMAL

## 2021-12-22 PROCEDURE — 99213 OFFICE O/P EST LOW 20 MIN: CPT | Performed by: NURSE PRACTITIONER

## 2021-12-22 PROCEDURE — 81003 URINALYSIS AUTO W/O SCOPE: CPT | Performed by: NURSE PRACTITIONER

## 2021-12-22 PROCEDURE — 87086 URINE CULTURE/COLONY COUNT: CPT | Performed by: NURSE PRACTITIONER

## 2021-12-22 RX ORDER — PHENAZOPYRIDINE HYDROCHLORIDE 100 MG/1
100 TABLET, FILM COATED ORAL 3 TIMES DAILY PRN
Qty: 6 TABLET | Refills: 0 | Status: SHIPPED | OUTPATIENT
Start: 2021-12-22 | End: 2022-10-13

## 2021-12-22 RX ORDER — CEFDINIR 300 MG/1
300 CAPSULE ORAL 2 TIMES DAILY
Qty: 14 CAPSULE | Refills: 0 | Status: SHIPPED | OUTPATIENT
Start: 2021-12-22 | End: 2021-12-29

## 2021-12-22 NOTE — PROGRESS NOTES
"  Follow Up Office Visit      Patient Name: Heidi Youssef  : 1940   MRN: [id number]   Care Team: Patient Care Team:  Kyle Piedra MD as PCP - General  Kyle Piedra MD as PCP - Family Medicine  Kyle Piedra MD as Referring Physician (Internal Medicine)  Shabbir Askew MD as Consulting Physician (Ophthalmology)  Charanjit Rousseau MD as Consulting Physician (Orthopedic Surgery)  Aroldo Wiggins MD as Consulting Physician (Ophthalmology)  Haylie Harris APRN as Nurse Practitioner (Nurse Practitioner)    Chief Complaint:    Chief Complaint   Patient presents with   • Urinary Tract Infection     urgency, frequency, burning, if positive does not want macrobid due to hair loss   • Back Pain     2-3 days ago lifting       History of Present Illness: Heidi Youssef is a 81 y.o. female with pertinent medical history significant for hypertension, hyperlipidemia, osteoarthritis.  She presents today for acute issue of low back pain and concern for urinary tract infection.    Noted that she was treated in November for E. Coli UTI with 5 day course of macrobid.  States she completed full course of treatment.  However, it was about 1 week later before she felt somewhat back to her normal.      Now, in last 3 days, has again developed urinary burning, urgency, and frequency.  Felt some chills but no definitive fever.  Denies nausea, vomiting.  Just \"dont feel right\".  Appetite is fair.      She is not tried any therapies OTC.  Subjective      Review of Systems:   Review of Systems   Constitutional: Positive for chills. Negative for fatigue and fever.   Gastrointestinal: Positive for abdominal pain.   Genitourinary: Positive for dysuria, frequency and urgency.       I have reviewed and the following portions of the patient's history were updated as appropriate: past family history, past medical history, past social history, past surgical history and problem " "list.    Medications:     Current Outpatient Medications:   •  acetaminophen (TYLENOL) 650 MG 8 hr tablet, Take 650 mg by mouth Every 12 (Twelve) Hours As Needed for Moderate Pain ., Disp: , Rfl:   •  Histamine Dihydrochloride (AUSTRALIAN DREAM ARTHRITIS EX), Apply  topically every night at bedtime., Disp: , Rfl:   •  losartan (COZAAR) 25 MG tablet, Take 1 tablet by mouth Daily., Disp: 30 tablet, Rfl: 11  •  Misc Natural Products (GLUCOSAMINE CHONDROITIN TRIPLE) tablet, Take 2 tablets by mouth Daily., Disp: , Rfl:   •  Multiple Vitamins-Minerals (PRESERVISION AREDS 2+MULTI VIT) capsule, Take 2 capsules by mouth Daily., Disp: , Rfl:   •  cefdinir (OMNICEF) 300 MG capsule, Take 1 capsule by mouth 2 (Two) Times a Day for 7 days., Disp: 14 capsule, Rfl: 0  •  nitrofurantoin, macrocrystal-monohydrate, (Macrobid) 100 MG capsule, Take 1 capsule by mouth 2 (Two) Times a Day., Disp: 10 capsule, Rfl: 0  •  phenazopyridine (Pyridium) 100 MG tablet, Take 1 tablet by mouth 3 (Three) Times a Day As Needed for Bladder Spasms., Disp: 6 tablet, Rfl: 0    Allergies:   Allergies   Allergen Reactions   • Morphine Nausea Only   • Aspirin Palpitations, Other (See Comments) and Unknown - Low Severity     causes tachycardia   • Caffeine Other (See Comments)     tachycardia   • Chocolate Other (See Comments)     tachycardia   • Ibuprofen Unknown - Low Severity   • Naproxen Unknown - Low Severity   • Nsaids Palpitations and Other (See Comments)     tachycardia   • Sulfacetamide Unknown - Low Severity   • Sulfamethoxazole-Trimethoprim Other (See Comments)     Severe joint pain  muscle weakness  Severe joint pain       Objective     Physical Exam:  Vital Signs:   Vitals:    12/22/21 1107   BP: 144/74   Pulse: 72   Temp: 98.2 °F (36.8 °C)   Weight: 79.8 kg (176 lb)   Height: 160 cm (62.99\")   PainSc:   5     Body mass index is 31.18 kg/m².     Physical Exam  Vitals and nursing note reviewed.   HENT:      Head: Normocephalic and atraumatic.      " Comments: Patient wearing facial mask.  Cardiovascular:      Rate and Rhythm: Normal rate and regular rhythm.   Pulmonary:      Effort: Respiratory distress present.   Abdominal:      General: Bowel sounds are normal. There is no distension.      Palpations: There is no mass.      Tenderness: There is no abdominal tenderness. There is no right CVA tenderness, left CVA tenderness or guarding.   Skin:     General: Skin is warm and dry.   Neurological:      Mental Status: She is alert.   Psychiatric:         Mood and Affect: Mood normal.         Thought Content: Thought content normal.         Judgment: Judgment normal.         Assessment / Plan      Assessment/Plan:   Problems Addressed This Visit    ICD-10-CM ICD-9-CM   1. Frequency of urination  R35.0 788.41   2. Dysuria  R30.0 788.1     Urinary frequency  Dysuria  -UA positive for leukocyte.  -Urine Culture pending  -Cefdinir 300 mg twice daily x7 days  -Pyridium 100 mg 3 times daily as needed x2 days  -Follow-up if symptoms worsen  Plan of care reviewed with patient at the conclusion of today's visit. Education was provided regarding diagnosis and management.  Patient verbalizes understanding of and agreement with management plan.    Patient Instructions   Urinary Tract Infection, Adult    A urinary tract infection (UTI) is an infection of any part of the urinary tract. The urinary tract includes the kidneys, ureters, bladder, and urethra. These organs make, store, and get rid of urine in the body.  Your health care provider may use other names to describe the infection. An upper UTI affects the ureters and kidneys (pyelonephritis). A lower UTI affects the bladder (cystitis) and urethra (urethritis).  What are the causes?  Most urinary tract infections are caused by bacteria in your genital area, around the entrance to your urinary tract (urethra). These bacteria grow and cause inflammation of your urinary tract.  What increases the risk?  You are more likely to  develop this condition if:  · You have a urinary catheter that stays in place (indwelling).  · You are not able to control when you urinate or have a bowel movement (you have incontinence).  · You are female and you:  ? Use a spermicide or diaphragm for birth control.  ? Have low estrogen levels.  ? Are pregnant.  · You have certain genes that increase your risk (genetics).  · You are sexually active.  · You take antibiotic medicines.  · You have a condition that causes your flow of urine to slow down, such as:  ? An enlarged prostate, if you are male.  ? Blockage in your urethra (stricture).  ? A kidney stone.  ? A nerve condition that affects your bladder control (neurogenic bladder).  ? Not getting enough to drink, or not urinating often.  · You have certain medical conditions, such as:  ? Diabetes.  ? A weak disease-fighting system (immunesystem).  ? Sickle cell disease.  ? Gout.  ? Spinal cord injury.  What are the signs or symptoms?  Symptoms of this condition include:  · Needing to urinate right away (urgently).  · Frequent urination or passing small amounts of urine frequently.  · Pain or burning with urination.  · Blood in the urine.  · Urine that smells bad or unusual.  · Trouble urinating.  · Cloudy urine.  · Vaginal discharge, if you are female.  · Pain in the abdomen or the lower back.  You may also have:  · Vomiting or a decreased appetite.  · Confusion.  · Irritability or tiredness.  · A fever.  · Diarrhea.  The first symptom in older adults may be confusion. In some cases, they may not have any symptoms until the infection has worsened.  How is this diagnosed?  This condition is diagnosed based on your medical history and a physical exam. You may also have other tests, including:  · Urine tests.  · Blood tests.  · Tests for sexually transmitted infections (STIs).  If you have had more than one UTI, a cystoscopy or imaging studies may be done to determine the cause of the infections.  How is this  treated?  Treatment for this condition includes:  · Antibiotic medicine.  · Over-the-counter medicines to treat discomfort.  · Drinking enough water to stay hydrated.  If you have frequent infections or have other conditions such as a kidney stone, you may need to see a health care provider who specializes in the urinary tract (urologist).  In rare cases, urinary tract infections can cause sepsis. Sepsis is a life-threatening condition that occurs when the body responds to an infection. Sepsis is treated in the hospital with IV antibiotics, fluids, and other medicines.  Follow these instructions at home:    Medicines  · Take over-the-counter and prescription medicines only as told by your health care provider.  · If you were prescribed an antibiotic medicine, take it as told by your health care provider. Do not stop using the antibiotic even if you start to feel better.  General instructions  · Make sure you:  ? Empty your bladder often and completely. Do not hold urine for long periods of time.  ? Empty your bladder after sex.  ? Wipe from front to back after a bowel movement if you are female. Use each tissue one time when you wipe.  · Drink enough fluid to keep your urine pale yellow.  · Keep all follow-up visits as told by your health care provider. This is important.  Contact a health care provider if:  · Your symptoms do not get better after 1-2 days.  · Your symptoms go away and then return.  Get help right away if you have:  · Severe pain in your back or your lower abdomen.  · A fever.  · Nausea or vomiting.  Summary  · A urinary tract infection (UTI) is an infection of any part of the urinary tract, which includes the kidneys, ureters, bladder, and urethra.  · Most urinary tract infections are caused by bacteria in your genital area, around the entrance to your urinary tract (urethra).  · Treatment for this condition often includes antibiotic medicines.  · If you were prescribed an antibiotic medicine, take  it as told by your health care provider. Do not stop using the antibiotic even if you start to feel better.  · Keep all follow-up visits as told by your health care provider. This is important.  This information is not intended to replace advice given to you by your health care provider. Make sure you discuss any questions you have with your health care provider.  Document Revised: 12/05/2019 Document Reviewed: 06/27/2019  [employer] Patient Education © 2021 [employer]        Follow Up:   Return if symptoms worsen or fail to improve, for Next scheduled follow up.        DELORES Denson  [facility] [address]    Please note that portions of this note were completed with a voice recognition program.

## 2021-12-22 NOTE — PROGRESS NOTES
Follow Up Office Visit      Patient Name: Heidi Youssef  : 1940   MRN: [id number]   Care Team: Patient Care Team:  Kyle Piedra MD as PCP - General  Kyle Piedra MD as PCP - Family Medicine  Kyle Piedra MD as Referring Physician (Internal Medicine)  Shabbir Askew MD as Consulting Physician (Ophthalmology)  Charanjit Rousseau MD as Consulting Physician (Orthopedic Surgery)  Aroldo Wiggins MD as Consulting Physician (Ophthalmology)  Haylie Harris APRN as Nurse Practitioner (Nurse Practitioner)    Chief Complaint:    Chief Complaint   Patient presents with   • Urinary Tract Infection     urgency, frequency, burning       History of Present Illness: Heidi Youssef is a 81 y.o. female who is here today to follow up with ***.     Subjective      Review of Systems:   Review of Systems    I have reviewed and the following portions of the patient's history were updated as appropriate: past family history, past medical history, past social history, past surgical history and problem list.    Medications:     Current Outpatient Medications:   •  acetaminophen (TYLENOL) 650 MG 8 hr tablet, Take 650 mg by mouth Every 12 (Twelve) Hours As Needed for Moderate Pain ., Disp: , Rfl:   •  Histamine Dihydrochloride (AUSTRALIAN DREAM ARTHRITIS EX), Apply  topically every night at bedtime., Disp: , Rfl:   •  losartan (COZAAR) 25 MG tablet, Take 1 tablet by mouth Daily., Disp: 30 tablet, Rfl: 11  •  Misc Natural Products (GLUCOSAMINE CHONDROITIN TRIPLE) tablet, Take 2 tablets by mouth Daily., Disp: , Rfl:   •  Multiple Vitamins-Minerals (PRESERVISION AREDS 2+MULTI VIT) capsule, Take 2 capsules by mouth Daily., Disp: , Rfl:   •  nitrofurantoin, macrocrystal-monohydrate, (Macrobid) 100 MG capsule, Take 1 capsule by mouth 2 (Two) Times a Day., Disp: 10 capsule, Rfl: 0    Allergies:   Allergies   Allergen Reactions   • Bactrim [Sulfamethoxazole-Trimethoprim] Other (See  "Comments)     Severe joint pain   • Aspirin Palpitations   • Nsaids Palpitations       Objective     Physical Exam:  Vital Signs:   Vitals:    12/22/21 1107   Height: 160 cm (62.99\")     Body mass index is 31.47 kg/m².     Physical Exam    Assessment / Plan      Assessment/Plan:   Problems Addressed This Visit  No diagnosis found.  Screenings due include:***.  Immunizations recommended are:***.  Routine labs recommended are: ***.    Plan of care reviewed with patient at the conclusion of today's visit. Education was provided regarding diagnosis and management.  Patient verbalizes understanding of and agreement with management plan.    There are no Patient Instructions on file for this visit.    Follow Up:   No follow-ups on file.    {Time Spent (Optional):54441}    DELORES Denson  [facility] [address]    Please note that portions of this note were completed with a voice recognition program.      "

## 2021-12-25 LAB
BACTERIA UR CULT: NORMAL
BACTERIA UR CULT: NORMAL

## 2021-12-29 ENCOUNTER — TELEPHONE (OUTPATIENT)
Dept: INTERNAL MEDICINE | Facility: CLINIC | Age: 81
End: 2021-12-29

## 2021-12-29 NOTE — TELEPHONE ENCOUNTER
Please let patient know that she didn't have a urinary tract infection, or else it maybe was just starting when she came in.    How are symptoms now?

## 2022-09-06 RX ORDER — LOSARTAN POTASSIUM 25 MG/1
25 TABLET ORAL DAILY
Qty: 30 TABLET | Refills: 11 | Status: SHIPPED | OUTPATIENT
Start: 2022-09-06

## 2022-09-06 NOTE — TELEPHONE ENCOUNTER
Rx Refill Note  Requested Prescriptions     Pending Prescriptions Disp Refills   • losartan (COZAAR) 25 MG tablet [Pharmacy Med Name: LOSARTAN 25MG TABLETS] 30 tablet 11     Sig: TAKE 1 TABLET BY MOUTH DAILY      Last office visit with prescribing clinician: 9/13/2021      Next office visit with prescribing clinician: Visit date not found            Nallely Triplett RN  09/06/22, 10:52 EDT

## 2022-10-13 ENCOUNTER — OFFICE VISIT (OUTPATIENT)
Dept: INTERNAL MEDICINE | Facility: CLINIC | Age: 82
End: 2022-10-13

## 2022-10-13 ENCOUNTER — LAB (OUTPATIENT)
Dept: LAB | Facility: HOSPITAL | Age: 82
End: 2022-10-13

## 2022-10-13 VITALS
SYSTOLIC BLOOD PRESSURE: 126 MMHG | DIASTOLIC BLOOD PRESSURE: 74 MMHG | TEMPERATURE: 98 F | BODY MASS INDEX: 29.34 KG/M2 | HEIGHT: 63 IN | HEART RATE: 68 BPM | WEIGHT: 165.6 LBS

## 2022-10-13 DIAGNOSIS — E55.9 VITAMIN D DEFICIENCY: ICD-10-CM

## 2022-10-13 DIAGNOSIS — E78.2 MIXED HYPERLIPIDEMIA: ICD-10-CM

## 2022-10-13 DIAGNOSIS — G60.9 IDIOPATHIC NEUROPATHY: ICD-10-CM

## 2022-10-13 DIAGNOSIS — Z00.00 MEDICARE ANNUAL WELLNESS VISIT, SUBSEQUENT: Primary | ICD-10-CM

## 2022-10-13 DIAGNOSIS — I10 PRIMARY HYPERTENSION: ICD-10-CM

## 2022-10-13 DIAGNOSIS — Z23 NEEDS FLU SHOT: ICD-10-CM

## 2022-10-13 LAB
25(OH)D3 SERPL-MCNC: 45.6 NG/ML (ref 30–100)
ALBUMIN SERPL-MCNC: 4.4 G/DL (ref 3.5–5.2)
ALBUMIN UR-MCNC: 5.6 MG/DL
ALBUMIN/GLOB SERPL: 1.5 G/DL
ALP SERPL-CCNC: 93 U/L (ref 39–117)
ALT SERPL W P-5'-P-CCNC: 11 U/L (ref 1–33)
ANION GAP SERPL CALCULATED.3IONS-SCNC: 11 MMOL/L (ref 5–15)
AST SERPL-CCNC: 20 U/L (ref 1–32)
BASOPHILS # BLD AUTO: 0.07 10*3/MM3 (ref 0–0.2)
BASOPHILS NFR BLD AUTO: 1 % (ref 0–1.5)
BILIRUB SERPL-MCNC: 0.6 MG/DL (ref 0–1.2)
BUN SERPL-MCNC: 14 MG/DL (ref 8–23)
BUN/CREAT SERPL: 21.9 (ref 7–25)
CALCIUM SPEC-SCNC: 9.6 MG/DL (ref 8.6–10.5)
CHLORIDE SERPL-SCNC: 106 MMOL/L (ref 98–107)
CHOLEST SERPL-MCNC: 204 MG/DL (ref 0–200)
CO2 SERPL-SCNC: 21 MMOL/L (ref 22–29)
CREAT SERPL-MCNC: 0.64 MG/DL (ref 0.57–1)
CREAT UR-MCNC: 142.1 MG/DL
DEPRECATED RDW RBC AUTO: 41.4 FL (ref 37–54)
EGFRCR SERPLBLD CKD-EPI 2021: 89.5 ML/MIN/1.73
EOSINOPHIL # BLD AUTO: 0.08 10*3/MM3 (ref 0–0.4)
EOSINOPHIL NFR BLD AUTO: 1.1 % (ref 0.3–6.2)
ERYTHROCYTE [DISTWIDTH] IN BLOOD BY AUTOMATED COUNT: 12.7 % (ref 12.3–15.4)
GLOBULIN UR ELPH-MCNC: 2.9 GM/DL
GLUCOSE SERPL-MCNC: 93 MG/DL (ref 65–99)
HCT VFR BLD AUTO: 41.6 % (ref 34–46.6)
HDLC SERPL-MCNC: 38 MG/DL (ref 40–60)
HGB BLD-MCNC: 14.2 G/DL (ref 12–15.9)
IMM GRANULOCYTES # BLD AUTO: 0.02 10*3/MM3 (ref 0–0.05)
IMM GRANULOCYTES NFR BLD AUTO: 0.3 % (ref 0–0.5)
LDLC SERPL CALC-MCNC: 137 MG/DL (ref 0–100)
LDLC/HDLC SERPL: 3.51 {RATIO}
LYMPHOCYTES # BLD AUTO: 2.22 10*3/MM3 (ref 0.7–3.1)
LYMPHOCYTES NFR BLD AUTO: 30.9 % (ref 19.6–45.3)
MCH RBC QN AUTO: 31.1 PG (ref 26.6–33)
MCHC RBC AUTO-ENTMCNC: 34.1 G/DL (ref 31.5–35.7)
MCV RBC AUTO: 91.2 FL (ref 79–97)
MICROALBUMIN/CREAT UR: 39.4 MG/G
MONOCYTES # BLD AUTO: 0.54 10*3/MM3 (ref 0.1–0.9)
MONOCYTES NFR BLD AUTO: 7.5 % (ref 5–12)
NEUTROPHILS NFR BLD AUTO: 4.25 10*3/MM3 (ref 1.7–7)
NEUTROPHILS NFR BLD AUTO: 59.2 % (ref 42.7–76)
NRBC BLD AUTO-RTO: 0 /100 WBC (ref 0–0.2)
PLATELET # BLD AUTO: 382 10*3/MM3 (ref 140–450)
PMV BLD AUTO: 10.2 FL (ref 6–12)
POTASSIUM SERPL-SCNC: 4 MMOL/L (ref 3.5–5.2)
PROT SERPL-MCNC: 7.3 G/DL (ref 6–8.5)
RBC # BLD AUTO: 4.56 10*6/MM3 (ref 3.77–5.28)
SODIUM SERPL-SCNC: 138 MMOL/L (ref 136–145)
TRIGL SERPL-MCNC: 163 MG/DL (ref 0–150)
TSH SERPL DL<=0.05 MIU/L-ACNC: 2.42 UIU/ML (ref 0.27–4.2)
VIT B12 BLD-MCNC: 178 PG/ML (ref 211–946)
VLDLC SERPL-MCNC: 29 MG/DL (ref 5–40)
WBC NRBC COR # BLD: 7.18 10*3/MM3 (ref 3.4–10.8)

## 2022-10-13 PROCEDURE — 82306 VITAMIN D 25 HYDROXY: CPT

## 2022-10-13 PROCEDURE — 1170F FXNL STATUS ASSESSED: CPT | Performed by: INTERNAL MEDICINE

## 2022-10-13 PROCEDURE — 82043 UR ALBUMIN QUANTITATIVE: CPT

## 2022-10-13 PROCEDURE — G0439 PPPS, SUBSEQ VISIT: HCPCS | Performed by: INTERNAL MEDICINE

## 2022-10-13 PROCEDURE — 84443 ASSAY THYROID STIM HORMONE: CPT

## 2022-10-13 PROCEDURE — 1159F MED LIST DOCD IN RCRD: CPT | Performed by: INTERNAL MEDICINE

## 2022-10-13 PROCEDURE — 82570 ASSAY OF URINE CREATININE: CPT

## 2022-10-13 PROCEDURE — 82607 VITAMIN B-12: CPT

## 2022-10-13 PROCEDURE — 90662 IIV NO PRSV INCREASED AG IM: CPT | Performed by: INTERNAL MEDICINE

## 2022-10-13 PROCEDURE — G0008 ADMIN INFLUENZA VIRUS VAC: HCPCS | Performed by: INTERNAL MEDICINE

## 2022-10-13 PROCEDURE — 80061 LIPID PANEL: CPT

## 2022-10-13 PROCEDURE — 85025 COMPLETE CBC W/AUTO DIFF WBC: CPT

## 2022-10-13 PROCEDURE — 80053 COMPREHEN METABOLIC PANEL: CPT

## 2022-10-13 NOTE — PROGRESS NOTES
QUICK REFERENCE INFORMATION:  The ABCs of the Annual Wellness Visit    Subsequent Medicare Wellness Visit    HEALTH RISK ASSESSMENT    1940    Recent Hospitalizations:  No hospitalization(s) within the last year..        Current Medical Providers:  Patient Care Team:  Kyle Piedra MD as PCP - General  Kyle Piedra MD as PCP - Family Medicine  Kyle Piedra MD as Referring Physician (Internal Medicine)  Shabbir Askew MD as Consulting Physician (Ophthalmology)  Charanjit Rousseau MD as Consulting Physician (Orthopedic Surgery)  Aroldo Wiggins MD as Consulting Physician (Ophthalmology)  Haylie Harris APRN as Nurse Practitioner (Nurse Practitioner)        Smoking Status:  Social History     Tobacco Use   Smoking Status Never   Smokeless Tobacco Never       Alcohol Consumption:  Social History     Substance and Sexual Activity   Alcohol Use Yes    Comment: occasional       Depression Screen:   PHQ-2/PHQ-9 Depression Screening 10/13/2022   Retired PHQ-9 Total Score -   Retired Total Score -   Little Interest or Pleasure in Doing Things 0-->not at all   Feeling Down, Depressed or Hopeless 0-->not at all   PHQ-9: Brief Depression Severity Measure Score 0       Health Habits and Functional and Cognitive Screening:  Functional & Cognitive Status 10/13/2022   Do you have difficulty preparing food and eating? No   Do you have difficulty bathing yourself, getting dressed or grooming yourself? No   Do you have difficulty using the toilet? No   Do you have difficulty moving around from place to place? No   Do you have trouble with steps or getting out of a bed or a chair? No   Current Diet Well Balanced Diet   Dental Exam Up to date   Eye Exam Up to date   Exercise (times per week) 0 times per week   Current Exercises Include No Regular Exercise   Current Exercise Activities Include -   Do you need help using the phone?  No   Are you deaf or do you have serious  difficulty hearing?  No   Do you need help with transportation? No   Do you need help shopping? No   Do you need help preparing meals?  No   Do you need help with housework?  Yes   Do you need help with laundry? No   Do you need help taking your medications? No   Do you need help managing money? No   Do you ever drive or ride in a car without wearing a seat belt? No   Have you felt unusual stress, anger or loneliness in the last month? No   Who do you live with? Alone   If you need help, do you have trouble finding someone available to you? No   Have you been bothered in the last four weeks by sexual problems? No   Do you have difficulty concentrating, remembering or making decisions? No       Fall Risk Screen:  [facility], and patient is at LOW risk for falls.Assessment completed on:10/13/2022    ACE III MINI        Does the patient have evidence of cognitive impairment? No    Aspirin use counseling: Does not need ASA (and currently is not on it)    Recent Lab Results:  CMP:  Lab Results   Component Value Date    BUN 14 10/13/2022    CREATININE 0.64 10/13/2022    EGFRIFNONA 91 09/13/2021    BCR 21.9 10/13/2022     10/13/2022    K 4.0 10/13/2022    CO2 21.0 (L) 10/13/2022    CALCIUM 9.6 10/13/2022    ALBUMIN 4.40 10/13/2022    BILITOT 0.6 10/13/2022    ALKPHOS 93 10/13/2022    AST 20 10/13/2022    ALT 11 10/13/2022     HbA1c:  No results found for: HGBA1C  Microalbumin:  Lab Results   Component Value Date    MICROALBUR 5.6 10/13/2022     Lipid Panel  Lab Results   Component Value Date    CHOL 204 (H) 10/13/2022    TRIG 163 (H) 10/13/2022    HDL 38 (L) 10/13/2022     (H) 10/13/2022    AST 20 10/13/2022    ALT 11 10/13/2022       Visual Acuity:  No results found.    Age-appropriate Screening Schedule:  Refer to the list below for future screening recommendations based on patient's age, sex and/or medical conditions. Orders for these recommended tests are listed in the plan  section. The patient has been provided with a written plan.    Health Maintenance   Topic Date Due   • ZOSTER VACCINE (1 of 2) Never done   • DXA SCAN  06/01/2018   • TDAP/TD VACCINES (2 - Td or Tdap) 10/25/2022   • LIPID PANEL  10/13/2023   • URINE MICROALBUMIN  10/13/2023   • INFLUENZA VACCINE  Completed   • HEMOGLOBIN A1C  Discontinued   • DIABETIC EYE EXAM  Discontinued        Subjective   History of Present Illness    Heidi Youssef is a 81 y.o. female who presents for a Subsequent Wellness Visit.    Status post appendectomy  The patient reports that she has recovered from her surgery and her appendix ruptured. She states that she is being tested for myasthenia gravis. She states that she was in the hospital the day after her surgery and the doctor came in and told her that her right eye had wandered towards her nose. She states that after seeing Dr. Miranda, she told her that usually if it is a result of anesthesia, it would wander out. She states that it went away after a few days. She states that she was kept in the hospital for 4 days because they tested her thinking she had a stroke. She states that they recommended that she see her ophthalmologist. She states that when she got back, they recommended that she see Dr. Miranda, who is an neuro-ophthalmologist. She states that she was told that she has droopy eyelids and something with the optic nerve. She states that she did not quite understand why she was sending her to a glaucoma doctor. She states that there is no family history of glaucoma. 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 She states that she thought it was just weak on that side. She states that she did not know what it happened because that did not affect her vision even though it was over here. She states that she is starting to have a little double vision in that eye.    Numbness in hands and feet  The patient states that she has numbness in  her hands and feet. She states that the fingertips feel a little numb and then she has tingling in her hands. She states that she went through that before about this hand, but then it was only happening at night. She states that it is all the time now. She states that she had carpal tunnel surgery on her right hand. She states that this last year it has gradually gotten worse. She states that the pads behind her toes are numb. She states that she has tingling in her feet. She states that she does not have diabetes. She mentions that the blood tests for myasthenia gravis all came back negative. She states that she is scheduled for an electromyography.    Fatigue  The patient states that she gets fatigued. She states that she will get up in the morning and think she is okay, she goes out and does a little gardening and all of a sudden she is really tired and has to come back in and sit down.    Pain all over body  The patient states that occasionally at night it will wake her up. She states that it feels like she has had pain all over her body. She adds that it started out in one place and going over her body. She notes that now she wakes up and it is just hurting all over, but it does go away after a few minutes.    Health maintenance  The patient states that she had a big episode of heart issues 2 years ago. She states that it turned out that she was having a lot of premature atrial contractions and premature ventricular contractions. She states that she had no stress. She states that she is wondering if she had COVID-19.  She states that all of his everybody went on a bus to [place] and they got sick. She states that it was before any vaccines. She states that she wishes she had not taken the metoprolol because it caused her to lose hair. She states that it is not coming back.    CHRONIC CONDITIONS    The following portions of the patient's history were reviewed and updated as appropriate: allergies, current  medications, past family history, past medical history, past social history, past surgical history and problem list.    Outpatient Medications Prior to Visit   Medication Sig Dispense Refill   • acetaminophen (TYLENOL) 650 MG 8 hr tablet Take 650 mg by mouth Every 12 (Twelve) Hours As Needed for Moderate Pain .     • Histamine Dihydrochloride (AUSTRALIAN DREAM ARTHRITIS EX) Apply  topically every night at bedtime.     • losartan (COZAAR) 25 MG tablet TAKE 1 TABLET BY MOUTH DAILY 30 tablet 11   • Misc Natural Products (GLUCOSAMINE CHONDROITIN TRIPLE) tablet Take 2 tablets by mouth Daily.     • Multiple Vitamins-Minerals (PRESERVISION AREDS 2+MULTI VIT) capsule Take 2 capsules by mouth Daily.     • polyethyl glycol-propyl glycol (SYSTANE) 0.4-0.3 % solution ophthalmic solution (artificial tears) Administer 1 drop to both eyes 4 (Four) Times a Day.     • nitrofurantoin, macrocrystal-monohydrate, (Macrobid) 100 MG capsule Take 1 capsule by mouth 2 (Two) Times a Day. 10 capsule 0   • phenazopyridine (Pyridium) 100 MG tablet Take 1 tablet by mouth 3 (Three) Times a Day As Needed for Bladder Spasms. 6 tablet 0     No facility-administered medications prior to visit.       Patient Active Problem List   Diagnosis   • Primary hypertension   • Paresthesias in right hand   • Paresthesias in left hand   • Mixed hyperlipidemia   • Sebaceous cyst   • Osteoarthritis   • Obesity   • Annual physical exam   • Chronic bilateral low back pain with right-sided sciatica   • Neck pain   • Fatigue   • Medicare annual wellness visit, subsequent   • Irregular heartbeat   • Vitamin D deficiency   • PAC (premature atrial contraction)       Advance Care Planning:  ACP discussion was held with the patient during this visit. Patient has an advance directive (not in EMR), copy requested.    Identification of Risk Factors:  Risk factors include: Advance Directive Discussion.    Review of Systems   Constitutional: Negative for chills, fatigue and  "fever.   HENT: Negative for congestion, ear pain and sinus pressure.    Respiratory: Negative for cough, chest tightness, shortness of breath and wheezing.    Cardiovascular: Negative for chest pain and palpitations.   Gastrointestinal: Negative for abdominal pain, blood in stool and constipation.   Skin: Negative for color change.   Allergic/Immunologic: Negative for environmental allergies.   Neurological: Negative for dizziness, speech difficulty and headaches.   Psychiatric/Behavioral: Negative for confusion. The patient is not nervous/anxious.        Compared to one year ago, the patient feels her physical health is the same.  Compared to one year ago, the patient feels her mental health is the same.    Objective     Physical Exam  Vitals and nursing note reviewed.   Constitutional:       General: She is not in acute distress.     Appearance: She is well-developed. She is not diaphoretic.   HENT:      Head: Normocephalic and atraumatic.   Eyes:      Conjunctiva/sclera: Conjunctivae normal.   Pulmonary:      Effort: Pulmonary effort is normal. No respiratory distress.   Skin:     General: Skin is warm.      Capillary Refill: Capillary refill takes less than 2 seconds.   Neurological:      Mental Status: She is alert.          Procedures     Vitals:    10/13/22 1013   BP: 126/74   BP Location: Left arm   Patient Position: Sitting   Cuff Size: Adult   Pulse: 68   Temp: 98 °F (36.7 °C)   Weight: 75.1 kg (165 lb 9.6 oz)   Height: 161 cm (63.39\")   PainSc: 0-No pain       BMI is >= 30 and <35. (Class 1 Obesity). The following options were offered after discussion;: exercise counseling/recommendations and nutrition counseling/recommendations      Assessment & Plan   Problem List Items Addressed This Visit        Cardiac and Vasculature    Primary hypertension    Overview     Continue losartan 25mg daily.         Relevant Medications    losartan (COZAAR) 25 MG tablet    Other Relevant Orders    CBC & Differential " (Completed)    Mixed hyperlipidemia    Overview     Check lipid panel today.          Relevant Orders    Comprehensive Metabolic Panel (Completed)    Lipid Panel (Completed)       Endocrine and Metabolic    Vitamin D deficiency    Relevant Orders    Vitamin D 25 Hydroxy (Completed)       Health Encounters    Medicare annual wellness visit, subsequent - Primary    Overview     Current on immunizations. Current on age recommended screenings.  Fasting labs today.        Other Visit Diagnoses     Needs flu shot        Relevant Orders    Fluzone High-Dose 65+yrs ([id number]) (Completed)    Microalbumin / Creatinine Urine Ratio - Urine, Clean Catch (Completed)    Idiopathic neuropathy        Relevant Orders    Vitamin B12 (Completed)    TSH (Completed)        Patient Self-Management and Personalized Health Advice  The patient has been provided with information about: diet, exercise and weight management and preventive services including:   · Annual Wellness Visit (AWV).    Outpatient Encounter Medications as of 10/13/2022   Medication Sig Dispense Refill   • acetaminophen (TYLENOL) 650 MG 8 hr tablet Take 650 mg by mouth Every 12 (Twelve) Hours As Needed for Moderate Pain .     • Histamine Dihydrochloride (AUSTRALIAN DREAM ARTHRITIS EX) Apply  topically every night at bedtime.     • losartan (COZAAR) 25 MG tablet TAKE 1 TABLET BY MOUTH DAILY 30 tablet 11   • Misc Natural Products (GLUCOSAMINE CHONDROITIN TRIPLE) tablet Take 2 tablets by mouth Daily.     • Multiple Vitamins-Minerals (PRESERVISION AREDS 2+MULTI VIT) capsule Take 2 capsules by mouth Daily.     • polyethyl glycol-propyl glycol (SYSTANE) 0.4-0.3 % solution ophthalmic solution (artificial tears) Administer 1 drop to both eyes 4 (Four) Times a Day.     • [DISCONTINUED] nitrofurantoin, macrocrystal-monohydrate, (Macrobid) 100 MG capsule Take 1 capsule by mouth 2 (Two) Times a Day. 10 capsule 0   • [DISCONTINUED] phenazopyridine (Pyridium) 100 MG tablet Take 1  tablet by mouth 3 (Three) Times a Day As Needed for Bladder Spasms. 6 tablet 0     No facility-administered encounter medications on file as of 10/13/2022.     1. Prevention  - Overall, she is doing well. She has no chronic medical problems, but she is currently being worked up for possible myasthenia gravis, which is discussed below. She had flu vaccine today and plans to get the surveillance Covid vaccine later this year.    2. Hypertension  - Blood pressure is well controlled on low dose losartan.    3. Hyperlipidemia  - Lipid panel is pending. Treatment remains healthy diet and avoidance of weight gain.    4. Vitamin D deficiency  - Vitamin D level is pending. She will continue taking 2000 units of vitamin D daily.    5. Possible neuropathy  - She is being worked up at  by neuro-ophthalmology because of an episode of extraocular muscle abnormality after her appendectomy and now numbness in hands and feet. Electromyography/NCV is scheduled. Apparently myasthenia serology was negative. She will let me know if she needs to be referred to a neurologist.    Follow up in 6 months.    Reviewed use of high risk medication in the elderly: yes  Reviewed for potential of harmful drug interactions in the elderly: yes    Follow Up:  Return in about 6 months (around 4/13/2023) for follow up.     There are no Patient Instructions on file for this visit.    An After Visit Summary and PPPS with all of these plans were given to the patient.        Transcribed from ambient dictation for Kyle Piedra MD by Niranjan Mays.  10/13/22   11:44 EDT    Patient or patient representative verbalized consent to the visit recording.  I have personally performed the services described in this document as transcribed by the above individual, and it is both accurate and complete.

## 2022-10-18 ENCOUNTER — CLINICAL SUPPORT (OUTPATIENT)
Dept: INTERNAL MEDICINE | Facility: CLINIC | Age: 82
End: 2022-10-18

## 2022-10-18 DIAGNOSIS — E53.8 B12 DEFICIENCY: Primary | ICD-10-CM

## 2022-10-18 PROCEDURE — 96372 THER/PROPH/DIAG INJ SC/IM: CPT | Performed by: INTERNAL MEDICINE

## 2022-10-18 RX ORDER — CYANOCOBALAMIN 1000 UG/ML
1000 INJECTION, SOLUTION INTRAMUSCULAR; SUBCUTANEOUS WEEKLY
Status: SHIPPED | OUTPATIENT
Start: 2022-10-18

## 2022-10-18 RX ADMIN — CYANOCOBALAMIN 1000 MCG: 1000 INJECTION, SOLUTION INTRAMUSCULAR; SUBCUTANEOUS at 16:11

## 2022-10-25 ENCOUNTER — CLINICAL SUPPORT (OUTPATIENT)
Dept: INTERNAL MEDICINE | Facility: CLINIC | Age: 82
End: 2022-10-25

## 2022-10-25 DIAGNOSIS — E53.8 B12 DEFICIENCY: ICD-10-CM

## 2022-10-25 PROCEDURE — 96372 THER/PROPH/DIAG INJ SC/IM: CPT | Performed by: INTERNAL MEDICINE

## 2022-10-25 RX ADMIN — CYANOCOBALAMIN 1000 MCG: 1000 INJECTION, SOLUTION INTRAMUSCULAR; SUBCUTANEOUS at 14:44

## 2022-10-26 ENCOUNTER — TELEPHONE (OUTPATIENT)
Dept: INTERNAL MEDICINE | Facility: CLINIC | Age: 82
End: 2022-10-26

## 2022-10-26 NOTE — TELEPHONE ENCOUNTER
Pt reports episode of lightheadedness, a retired [employer] surgeon witnessed it, felt it was probably vasovagal.  Please check on her.  Is she doing ok today?  Did she lose consciousness?  What was she doing when it happened.

## 2022-10-27 NOTE — TELEPHONE ENCOUNTER
Pt states she sitting at a benefit for the opera and just passed out - she did have LOC. States she had a very stressful day and had gotten some bad news medically , and had been standing for about 90 minutes in a hot hallway

## 2022-11-01 ENCOUNTER — CLINICAL SUPPORT (OUTPATIENT)
Dept: INTERNAL MEDICINE | Facility: CLINIC | Age: 82
End: 2022-11-01

## 2022-11-01 DIAGNOSIS — E53.8 VITAMIN B12 DEFICIENCY: ICD-10-CM

## 2022-11-01 PROCEDURE — 96372 THER/PROPH/DIAG INJ SC/IM: CPT | Performed by: INTERNAL MEDICINE

## 2022-11-01 RX ADMIN — CYANOCOBALAMIN 1000 MCG: 1000 INJECTION, SOLUTION INTRAMUSCULAR; SUBCUTANEOUS at 14:13

## 2022-11-08 ENCOUNTER — IMMUNIZATION (OUTPATIENT)
Dept: INTERNAL MEDICINE | Facility: CLINIC | Age: 82
End: 2022-11-08

## 2022-11-08 DIAGNOSIS — E53.8 B12 DEFICIENCY: ICD-10-CM

## 2022-11-08 DIAGNOSIS — Z23 NEED FOR VACCINATION: Primary | ICD-10-CM

## 2022-11-08 PROCEDURE — 91312 COVID-19 (PFIZER) BIVALENT BOOSTER 12+YRS: CPT | Performed by: INTERNAL MEDICINE

## 2022-11-08 PROCEDURE — 0124A COVID-19 (PFIZER) BIVALENT BOOSTER 12+YRS: CPT | Performed by: INTERNAL MEDICINE

## 2022-11-08 PROCEDURE — 96372 THER/PROPH/DIAG INJ SC/IM: CPT | Performed by: INTERNAL MEDICINE

## 2022-11-08 RX ADMIN — CYANOCOBALAMIN 1000 MCG: 1000 INJECTION, SOLUTION INTRAMUSCULAR; SUBCUTANEOUS at 14:21

## 2022-11-15 ENCOUNTER — CLINICAL SUPPORT (OUTPATIENT)
Dept: INTERNAL MEDICINE | Facility: CLINIC | Age: 82
End: 2022-11-15

## 2022-11-15 DIAGNOSIS — E53.8 B12 DEFICIENCY: ICD-10-CM

## 2022-11-15 PROCEDURE — 96372 THER/PROPH/DIAG INJ SC/IM: CPT | Performed by: INTERNAL MEDICINE

## 2022-11-15 RX ADMIN — CYANOCOBALAMIN 1000 MCG: 1000 INJECTION, SOLUTION INTRAMUSCULAR; SUBCUTANEOUS at 15:50

## 2022-11-22 ENCOUNTER — CLINICAL SUPPORT (OUTPATIENT)
Dept: INTERNAL MEDICINE | Facility: CLINIC | Age: 82
End: 2022-11-22

## 2022-11-22 DIAGNOSIS — E53.8 B12 DEFICIENCY: ICD-10-CM

## 2022-11-22 PROCEDURE — 96372 THER/PROPH/DIAG INJ SC/IM: CPT | Performed by: INTERNAL MEDICINE

## 2022-11-22 RX ADMIN — CYANOCOBALAMIN 1000 MCG: 1000 INJECTION, SOLUTION INTRAMUSCULAR; SUBCUTANEOUS at 11:03

## 2022-11-29 ENCOUNTER — CLINICAL SUPPORT (OUTPATIENT)
Dept: INTERNAL MEDICINE | Facility: CLINIC | Age: 82
End: 2022-11-29

## 2022-11-29 DIAGNOSIS — E53.8 VITAMIN B12 DEFICIENCY: ICD-10-CM

## 2022-11-29 PROCEDURE — 96372 THER/PROPH/DIAG INJ SC/IM: CPT | Performed by: INTERNAL MEDICINE

## 2022-11-29 RX ADMIN — CYANOCOBALAMIN 1000 MCG: 1000 INJECTION, SOLUTION INTRAMUSCULAR; SUBCUTANEOUS at 11:38

## 2022-12-06 ENCOUNTER — CLINICAL SUPPORT (OUTPATIENT)
Dept: INTERNAL MEDICINE | Facility: CLINIC | Age: 82
End: 2022-12-06

## 2022-12-06 DIAGNOSIS — E53.8 B12 DEFICIENCY: ICD-10-CM

## 2022-12-06 PROCEDURE — 96372 THER/PROPH/DIAG INJ SC/IM: CPT | Performed by: INTERNAL MEDICINE

## 2022-12-06 RX ADMIN — CYANOCOBALAMIN 1000 MCG: 1000 INJECTION, SOLUTION INTRAMUSCULAR; SUBCUTANEOUS at 14:36

## 2022-12-13 ENCOUNTER — CLINICAL SUPPORT (OUTPATIENT)
Dept: INTERNAL MEDICINE | Facility: CLINIC | Age: 82
End: 2022-12-13

## 2022-12-13 ENCOUNTER — TELEPHONE (OUTPATIENT)
Dept: INTERNAL MEDICINE | Facility: CLINIC | Age: 82
End: 2022-12-13

## 2022-12-13 DIAGNOSIS — R20.2 PARESTHESIAS IN LEFT HAND: ICD-10-CM

## 2022-12-13 DIAGNOSIS — R20.2 PARESTHESIAS IN RIGHT HAND: ICD-10-CM

## 2022-12-13 PROCEDURE — 96372 THER/PROPH/DIAG INJ SC/IM: CPT | Performed by: INTERNAL MEDICINE

## 2022-12-13 RX ADMIN — CYANOCOBALAMIN 1000 MCG: 1000 INJECTION, SOLUTION INTRAMUSCULAR; SUBCUTANEOUS at 12:13

## 2022-12-13 NOTE — TELEPHONE ENCOUNTER
Patient was in the office today for her B12 injection.  She wanted to ask Dr. Piedra if he would accept 2 new patients - friends of hers - that have recently moved here from [facility].  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  They are living at [facility].  Just let Ms. Mikael know.

## 2022-12-14 NOTE — TELEPHONE ENCOUNTER
Please let her know I just can't do it right now, my practice is over flowing and right now my established patients can't get in until March.

## 2022-12-14 NOTE — TELEPHONE ENCOUNTER
Patient verbalized understanding.    I suggested to her that Dr. Bird is excepting new patients she stated she will let their daughter know

## 2022-12-20 ENCOUNTER — CLINICAL SUPPORT (OUTPATIENT)
Dept: INTERNAL MEDICINE | Facility: CLINIC | Age: 82
End: 2022-12-20
Payer: MEDICARE

## 2022-12-20 DIAGNOSIS — E53.8 B12 DEFICIENCY: ICD-10-CM

## 2022-12-20 PROCEDURE — 96372 THER/PROPH/DIAG INJ SC/IM: CPT | Performed by: INTERNAL MEDICINE

## 2022-12-20 RX ADMIN — CYANOCOBALAMIN 1000 MCG: 1000 INJECTION, SOLUTION INTRAMUSCULAR; SUBCUTANEOUS at 14:18

## 2022-12-27 ENCOUNTER — CLINICAL SUPPORT (OUTPATIENT)
Dept: INTERNAL MEDICINE | Facility: CLINIC | Age: 82
End: 2022-12-27

## 2022-12-27 DIAGNOSIS — E53.8 B12 DEFICIENCY: ICD-10-CM

## 2022-12-27 PROCEDURE — 96372 THER/PROPH/DIAG INJ SC/IM: CPT | Performed by: INTERNAL MEDICINE

## 2022-12-27 RX ADMIN — CYANOCOBALAMIN 1000 MCG: 1000 INJECTION, SOLUTION INTRAMUSCULAR; SUBCUTANEOUS at 14:43

## 2023-01-03 ENCOUNTER — CLINICAL SUPPORT (OUTPATIENT)
Dept: INTERNAL MEDICINE | Facility: CLINIC | Age: 83
End: 2023-01-03
Payer: MEDICARE

## 2023-01-03 DIAGNOSIS — E53.8 VITAMIN B12 DEFICIENCY: ICD-10-CM

## 2023-01-03 PROCEDURE — 96372 THER/PROPH/DIAG INJ SC/IM: CPT | Performed by: INTERNAL MEDICINE

## 2023-01-03 RX ADMIN — CYANOCOBALAMIN 1000 MCG: 1000 INJECTION, SOLUTION INTRAMUSCULAR; SUBCUTANEOUS at 14:30

## 2023-04-12 ENCOUNTER — OFFICE VISIT (OUTPATIENT)
Dept: INTERNAL MEDICINE | Facility: CLINIC | Age: 83
End: 2023-04-12
Payer: MEDICARE

## 2023-04-12 VITALS
BODY MASS INDEX: 29.66 KG/M2 | TEMPERATURE: 98 F | SYSTOLIC BLOOD PRESSURE: 148 MMHG | HEART RATE: 80 BPM | HEIGHT: 63 IN | DIASTOLIC BLOOD PRESSURE: 86 MMHG | WEIGHT: 167.4 LBS

## 2023-04-12 DIAGNOSIS — I10 PRIMARY HYPERTENSION: Primary | ICD-10-CM

## 2023-04-12 DIAGNOSIS — R42 VERTIGO: ICD-10-CM

## 2023-04-12 DIAGNOSIS — E78.2 MIXED HYPERLIPIDEMIA: ICD-10-CM

## 2023-04-12 DIAGNOSIS — E53.8 VITAMIN B 12 DEFICIENCY: ICD-10-CM

## 2023-04-12 RX ORDER — TRIAMCINOLONE ACETONIDE 40 MG/ML
80 INJECTION, SUSPENSION INTRA-ARTICULAR; INTRAMUSCULAR ONCE
Status: COMPLETED | OUTPATIENT
Start: 2023-04-12 | End: 2023-04-12

## 2023-04-12 RX ORDER — LATANOPROST 50 UG/ML
1 SOLUTION/ DROPS OPHTHALMIC DAILY
COMMUNITY
Start: 2023-01-14

## 2023-04-12 RX ADMIN — TRIAMCINOLONE ACETONIDE 80 MG: 40 INJECTION, SUSPENSION INTRA-ARTICULAR; INTRAMUSCULAR at 14:55

## 2023-04-12 RX ADMIN — CYANOCOBALAMIN 1000 MCG: 1000 INJECTION, SOLUTION INTRAMUSCULAR; SUBCUTANEOUS at 15:06

## 2023-04-12 NOTE — PROGRESS NOTES
[facility]     Heidi Youssef  1940   [id number]      Patient Care Team:  Kyle Piedra MD as PCP - General  Kyle Piedra MD as PCP - Family Medicine  Kyle Piedra MD as Referring Physician (Internal Medicine)  Sahbbir Askew MD as Consulting Physician (Ophthalmology)  Charanjit Rousseau MD as Consulting Physician (Orthopedic Surgery)  Aroldo Wiggins MD as Consulting Physician (Ophthalmology)  Haylie Harris APRN as Nurse Practitioner (Nurse Practitioner)    Chief Complaint::   Chief Complaint   Patient presents with   • Hyperlipidemia   • Hypertension   • Dizziness     Vertigo - onset Sat        HPI    The patient comes in for follow-up of hypertension and hyperlipidemia. Since I last saw her, she had a blood clot on her optic nerve. She was seen by neuro-ophthalmology at the [facility] who, at one point, thought that she might have myasthenia gravis, but that proved not to be the case. The blood clot is gone. She also describes an episode where she was a bit confused and was concerned she might have had a TIA. She had a carotid duplex and CT scan last year. Today, she has vertigo. There is no ear or sinus congestion. No allergy symptoms.    Chronic Conditions:  Hypertension and hyperlipidemia.    Patient Active Problem List   Diagnosis   • Primary hypertension   • Paresthesias in right hand   • Paresthesias in left hand   • Mixed hyperlipidemia   • Sebaceous cyst   • Osteoarthritis   • Obesity   • Annual physical exam   • Chronic bilateral low back pain with right-sided sciatica   • Neck pain   • Fatigue   • Medicare annual wellness visit, subsequent   • Irregular heartbeat   • Vitamin D deficiency   • PAC (premature atrial contraction)        Past Medical History:   Diagnosis Date   • Arthritis    • Background diabetic retinopathy of left eye determined by examination 9/14/2021   • Breast injury    • Hyperlipidemia    • Hypertension    •  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   • Ocular histoplasmosis 1992   • Osteoarthritis        Past Surgical History:   Procedure Laterality Date   • CARPAL TUNNEL RELEASE  12/16/2015    Right   • JOINT REPLACEMENT      rt knee partial replacement   • REPLACEMENT TOTAL KNEE  2014   • TONSILLECTOMY  1952   • TUBAL ABDOMINAL LIGATION Bilateral 1975       Family History   Problem Relation Age of Onset   • Breast cancer Maternal Aunt 45   • Arthritis Mother         OA   • Hypertension Mother    • Diabetes Mother    • Heart attack Mother 75   • Coronary artery disease Father 49   • Diabetes Maternal Uncle    • Atrial fibrillation Brother         Late 60s s/p ablation       Social History     Socioeconomic History   • Marital status:    Tobacco Use   • Smoking status: Never   • Smokeless tobacco: Never   Substance and Sexual Activity   • Alcohol use: Yes     Comment: occasional   • Drug use: No   • Sexual activity: Defer       Allergies   Allergen Reactions   • Morphine Nausea Only   • Aspirin Palpitations, Other (See Comments) and Unknown - Low Severity     causes tachycardia   • Caffeine Other (See Comments)     tachycardia   • Chocolate Other (See Comments)     tachycardia   • Ibuprofen Unknown - Low Severity   • Naproxen Unknown - Low Severity   • Nsaids Palpitations and Other (See Comments)     tachycardia   • Sulfacetamide Unknown - Low Severity   • Sulfamethoxazole-Trimethoprim Other (See Comments)     Severe joint pain  muscle weakness  Severe joint pain         Current Outpatient Medications:   •  acetaminophen (TYLENOL) 650 MG 8 hr tablet, Take 1 tablet by mouth Every 12 (Twelve) Hours As Needed for Moderate Pain., Disp: , Rfl:   •  Histamine Dihydrochloride (AUSTRALIAN DREAM ARTHRITIS EX), Apply  topically every night at bedtime., Disp: , Rfl:   •  losartan (COZAAR) 25 MG tablet, TAKE 1 TABLET BY MOUTH DAILY, Disp: 30 tablet, Rfl: 11  •  Misc Natural Products (GLUCOSAMINE CHONDROITIN TRIPLE)  "tablet, Take 2 tablets by mouth Daily., Disp: , Rfl:   •  Multiple Vitamins-Minerals (PRESERVISION AREDS 2+MULTI VIT) capsule, Take 2 capsules by mouth Daily., Disp: , Rfl:   •  polyethyl glycol-propyl glycol (SYSTANE) 0.4-0.3 % solution ophthalmic solution (artificial tears), Administer 1 drop to both eyes 4 (Four) Times a Day., Disp: , Rfl:   •  latanoprost (XALATAN) 0.005 % ophthalmic solution, Apply 1 drop to eye(s) as directed by provider Daily. Left eye, Disp: , Rfl:     Current Facility-Administered Medications:   •  cyanocobalamin injection 1,000 mcg, 1,000 mcg, Intramuscular, Weekly, Kyle Piedra MD, 1,000 mcg at 04/12/23 1506    Review of Systems     A review of systems was performed and positive findings are noted in the HPI.    Vital Signs  Vitals:    04/12/23 1352   BP: 148/86   BP Location: Left arm   Patient Position: Sitting   Cuff Size: Large Adult   Pulse: 80   Temp: 98 °F (36.7 °C)   Weight: 75.9 kg (167 lb 6.4 oz)   Height: 161 cm (63.39\")   PainSc:   4   PainLoc: Generalized  Comment: on and off - neuropathy hands and feet       Physical Exam  Vitals and nursing note reviewed.   Constitutional:       Appearance: She is well-developed. She is obese.   HENT:      Head: Normocephalic and atraumatic.      Right Ear: External ear normal.      Left Ear: External ear normal.      Nose: Nose normal.      Mouth/Throat:      Pharynx: No oropharyngeal exudate.   Eyes:      Conjunctiva/sclera: Conjunctivae normal.      Pupils: Pupils are equal, round, and reactive to light.   Neck:      Thyroid: No thyromegaly.      Vascular: No JVD.   Cardiovascular:      Rate and Rhythm: Normal rate and regular rhythm.      Heart sounds: Normal heart sounds. No murmur heard.    No friction rub. No gallop.   Pulmonary:      Effort: Pulmonary effort is normal. No respiratory distress.      Breath sounds: Normal breath sounds. No wheezing or rales.   Chest:      Chest wall: No tenderness.   Abdominal:      General: " Bowel sounds are normal.      Palpations: Abdomen is soft.      Tenderness: There is no abdominal tenderness.   Musculoskeletal:         General: No tenderness. Normal range of motion.      Cervical back: Normal range of motion and neck supple.   Lymphadenopathy:      Cervical: No cervical adenopathy.   Skin:     General: Skin is warm and dry.      Findings: No erythema or rash.   Neurological:      Mental Status: She is alert and oriented to person, place, and time.      Cranial Nerves: No cranial nerve deficit.      Motor: No abnormal muscle tone.   Psychiatric:         Behavior: Behavior normal.         Thought Content: Thought content normal.          Procedures    ACE III MINI             Assessment/Plan:    Diagnoses and all orders for this visit:    1. Primary hypertension (Primary)    2. Mixed hyperlipidemia    3. Vertigo  -     triamcinolone acetonide (KENALOG-40) injection 80 mg    4. Vitamin B 12 deficiency        1. Hypertension  - Blood pressure is elevated today, but she is somewhat stressed. She will continue monitoring.    2. Hyperlipidemia  - She will continue healthy diet. We will plan for a lipid panel at the next visit.    3. Vertigo  - Most likely labyrinthitis. Since there is a possibility she has glaucoma, she is given triamcinolone instead of antihistamines.    4. Transient episode of eye muscle difficulty  - This remains unexplained at present, but if symptoms were to recur, we would consider referring her to Dr. Remi Mistry.    Follow up in 6 months.    Plan of care reviewed with patient at the conclusion of today's visit. Education was provided regarding diagnosis, management, and any prescribed or recommended OTC medications.Patient verbalizes understanding of and agreement with management plan.         Kyle Piedra MD       Transcribed from ambient dictation for Kyle Piedra MD by Michael Shannon.  04/12/23   17:11 EDT    Patient or patient representative verbalized  consent to the visit recording.  I have personally performed the services described in this document as transcribed by the above individual, and it is both accurate and complete.

## 2023-05-03 DIAGNOSIS — H32 OCULAR HISTOPLASMOSIS: ICD-10-CM

## 2023-05-03 DIAGNOSIS — B39.9 OCULAR HISTOPLASMOSIS: ICD-10-CM

## 2023-05-03 DIAGNOSIS — H53.2 DIPLOPIA: Primary | ICD-10-CM

## 2023-05-16 ENCOUNTER — CLINICAL SUPPORT (OUTPATIENT)
Dept: INTERNAL MEDICINE | Facility: CLINIC | Age: 83
End: 2023-05-16
Payer: MEDICARE

## 2023-05-16 DIAGNOSIS — E53.8 B12 DEFICIENCY: ICD-10-CM

## 2023-05-16 PROCEDURE — 96372 THER/PROPH/DIAG INJ SC/IM: CPT | Performed by: INTERNAL MEDICINE

## 2023-05-16 RX ADMIN — CYANOCOBALAMIN 1000 MCG: 1000 INJECTION, SOLUTION INTRAMUSCULAR; SUBCUTANEOUS at 11:32

## 2023-06-12 DIAGNOSIS — E53.8 B12 DEFICIENCY: Primary | ICD-10-CM

## 2023-06-13 ENCOUNTER — CLINICAL SUPPORT (OUTPATIENT)
Dept: INTERNAL MEDICINE | Facility: CLINIC | Age: 83
End: 2023-06-13
Payer: MEDICARE

## 2023-06-13 ENCOUNTER — LAB (OUTPATIENT)
Dept: LAB | Facility: HOSPITAL | Age: 83
End: 2023-06-13
Payer: MEDICARE

## 2023-06-13 DIAGNOSIS — E53.8 B12 DEFICIENCY: ICD-10-CM

## 2023-06-13 DIAGNOSIS — E53.8 B12 DEFICIENCY: Primary | ICD-10-CM

## 2023-06-13 PROCEDURE — 36415 COLL VENOUS BLD VENIPUNCTURE: CPT

## 2023-06-13 PROCEDURE — 96372 THER/PROPH/DIAG INJ SC/IM: CPT | Performed by: INTERNAL MEDICINE

## 2023-06-13 PROCEDURE — 83921 ORGANIC ACID SINGLE QUANT: CPT

## 2023-06-13 PROCEDURE — 82607 VITAMIN B-12: CPT

## 2023-06-13 RX ADMIN — CYANOCOBALAMIN 1000 MCG: 1000 INJECTION, SOLUTION INTRAMUSCULAR; SUBCUTANEOUS at 13:27

## 2023-06-14 LAB — VIT B12 BLD-MCNC: 491 PG/ML (ref 211–946)

## 2023-06-18 LAB — METHYLMALONATE SERPL-SCNC: 98 NMOL/L (ref 0–378)

## 2023-09-05 RX ORDER — LOSARTAN POTASSIUM 25 MG/1
25 TABLET ORAL DAILY
Qty: 30 TABLET | Refills: 11 | Status: SHIPPED | OUTPATIENT
Start: 2023-09-05

## 2023-10-31 ENCOUNTER — OFFICE VISIT (OUTPATIENT)
Dept: INTERNAL MEDICINE | Facility: CLINIC | Age: 83
End: 2023-10-31
Payer: MEDICARE

## 2023-10-31 ENCOUNTER — LAB (OUTPATIENT)
Dept: LAB | Facility: HOSPITAL | Age: 83
End: 2023-10-31
Payer: MEDICARE

## 2023-10-31 VITALS
HEIGHT: 63 IN | TEMPERATURE: 98.2 F | BODY MASS INDEX: 29.8 KG/M2 | HEART RATE: 72 BPM | DIASTOLIC BLOOD PRESSURE: 82 MMHG | WEIGHT: 168.2 LBS | SYSTOLIC BLOOD PRESSURE: 146 MMHG

## 2023-10-31 DIAGNOSIS — R20.2 PARESTHESIAS IN LEFT HAND: ICD-10-CM

## 2023-10-31 DIAGNOSIS — R20.2 PARESTHESIAS IN RIGHT HAND: ICD-10-CM

## 2023-10-31 DIAGNOSIS — I10 PRIMARY HYPERTENSION: ICD-10-CM

## 2023-10-31 DIAGNOSIS — Z00.00 MEDICARE ANNUAL WELLNESS VISIT, SUBSEQUENT: Primary | ICD-10-CM

## 2023-10-31 DIAGNOSIS — E78.2 MIXED HYPERLIPIDEMIA: ICD-10-CM

## 2023-10-31 DIAGNOSIS — E55.9 VITAMIN D DEFICIENCY: ICD-10-CM

## 2023-10-31 DIAGNOSIS — E53.8 B12 DEFICIENCY: ICD-10-CM

## 2023-10-31 LAB
ALBUMIN SERPL-MCNC: 4.4 G/DL (ref 3.5–5.2)
ALBUMIN/GLOB SERPL: 1.3 G/DL
ALP SERPL-CCNC: 81 U/L (ref 39–117)
ALT SERPL W P-5'-P-CCNC: 17 U/L (ref 1–33)
ANION GAP SERPL CALCULATED.3IONS-SCNC: 12 MMOL/L (ref 5–15)
AST SERPL-CCNC: 17 U/L (ref 1–32)
BASOPHILS # BLD AUTO: 0.09 10*3/MM3 (ref 0–0.2)
BASOPHILS NFR BLD AUTO: 1.2 % (ref 0–1.5)
BILIRUB SERPL-MCNC: 0.8 MG/DL (ref 0–1.2)
BUN SERPL-MCNC: 14 MG/DL (ref 8–23)
BUN/CREAT SERPL: 19.2 (ref 7–25)
CALCIUM SPEC-SCNC: 9.9 MG/DL (ref 8.6–10.5)
CHLORIDE SERPL-SCNC: 105 MMOL/L (ref 98–107)
CHOLEST SERPL-MCNC: 216 MG/DL (ref 0–200)
CO2 SERPL-SCNC: 23 MMOL/L (ref 22–29)
CREAT SERPL-MCNC: 0.73 MG/DL (ref 0.57–1)
DEPRECATED RDW RBC AUTO: 41.2 FL (ref 37–54)
EGFRCR SERPLBLD CKD-EPI 2021: 82.7 ML/MIN/1.73
EOSINOPHIL # BLD AUTO: 0.07 10*3/MM3 (ref 0–0.4)
EOSINOPHIL NFR BLD AUTO: 0.9 % (ref 0.3–6.2)
ERYTHROCYTE [DISTWIDTH] IN BLOOD BY AUTOMATED COUNT: 12.4 % (ref 12.3–15.4)
GLOBULIN UR ELPH-MCNC: 3.5 GM/DL
GLUCOSE SERPL-MCNC: 92 MG/DL (ref 65–99)
HCT VFR BLD AUTO: 43.3 % (ref 34–46.6)
HDLC SERPL-MCNC: 43 MG/DL (ref 40–60)
HGB BLD-MCNC: 14.7 G/DL (ref 12–15.9)
IMM GRANULOCYTES # BLD AUTO: 0.02 10*3/MM3 (ref 0–0.05)
IMM GRANULOCYTES NFR BLD AUTO: 0.3 % (ref 0–0.5)
LDLC SERPL CALC-MCNC: 149 MG/DL (ref 0–100)
LDLC/HDLC SERPL: 3.41 {RATIO}
LYMPHOCYTES # BLD AUTO: 2.39 10*3/MM3 (ref 0.7–3.1)
LYMPHOCYTES NFR BLD AUTO: 31.7 % (ref 19.6–45.3)
MCH RBC QN AUTO: 30.9 PG (ref 26.6–33)
MCHC RBC AUTO-ENTMCNC: 33.9 G/DL (ref 31.5–35.7)
MCV RBC AUTO: 91.2 FL (ref 79–97)
MONOCYTES # BLD AUTO: 0.59 10*3/MM3 (ref 0.1–0.9)
MONOCYTES NFR BLD AUTO: 7.8 % (ref 5–12)
NEUTROPHILS NFR BLD AUTO: 4.37 10*3/MM3 (ref 1.7–7)
NEUTROPHILS NFR BLD AUTO: 58.1 % (ref 42.7–76)
NRBC BLD AUTO-RTO: 0 /100 WBC (ref 0–0.2)
PLATELET # BLD AUTO: 401 10*3/MM3 (ref 140–450)
PMV BLD AUTO: 10.3 FL (ref 6–12)
POTASSIUM SERPL-SCNC: 4.3 MMOL/L (ref 3.5–5.2)
PROT SERPL-MCNC: 7.9 G/DL (ref 6–8.5)
RBC # BLD AUTO: 4.75 10*6/MM3 (ref 3.77–5.28)
SODIUM SERPL-SCNC: 140 MMOL/L (ref 136–145)
TRIGL SERPL-MCNC: 131 MG/DL (ref 0–150)
VLDLC SERPL-MCNC: 24 MG/DL (ref 5–40)
WBC NRBC COR # BLD: 7.53 10*3/MM3 (ref 3.4–10.8)

## 2023-10-31 PROCEDURE — 82570 ASSAY OF URINE CREATININE: CPT

## 2023-10-31 PROCEDURE — G0439 PPPS, SUBSEQ VISIT: HCPCS | Performed by: INTERNAL MEDICINE

## 2023-10-31 PROCEDURE — 82607 VITAMIN B-12: CPT

## 2023-10-31 PROCEDURE — 82306 VITAMIN D 25 HYDROXY: CPT

## 2023-10-31 PROCEDURE — 82043 UR ALBUMIN QUANTITATIVE: CPT

## 2023-10-31 PROCEDURE — 96372 THER/PROPH/DIAG INJ SC/IM: CPT | Performed by: INTERNAL MEDICINE

## 2023-10-31 PROCEDURE — 3079F DIAST BP 80-89 MM HG: CPT | Performed by: INTERNAL MEDICINE

## 2023-10-31 PROCEDURE — 1170F FXNL STATUS ASSESSED: CPT | Performed by: INTERNAL MEDICINE

## 2023-10-31 PROCEDURE — 80053 COMPREHEN METABOLIC PANEL: CPT

## 2023-10-31 PROCEDURE — 80061 LIPID PANEL: CPT

## 2023-10-31 PROCEDURE — 85025 COMPLETE CBC W/AUTO DIFF WBC: CPT

## 2023-10-31 PROCEDURE — 1159F MED LIST DOCD IN RCRD: CPT | Performed by: INTERNAL MEDICINE

## 2023-10-31 PROCEDURE — 3077F SYST BP >= 140 MM HG: CPT | Performed by: INTERNAL MEDICINE

## 2023-10-31 PROCEDURE — 1160F RVW MEDS BY RX/DR IN RCRD: CPT | Performed by: INTERNAL MEDICINE

## 2023-10-31 PROCEDURE — 90662 IIV NO PRSV INCREASED AG IM: CPT | Performed by: INTERNAL MEDICINE

## 2023-10-31 PROCEDURE — G0008 ADMIN INFLUENZA VIRUS VAC: HCPCS | Performed by: INTERNAL MEDICINE

## 2023-10-31 RX ORDER — TRIAMCINOLONE ACETONIDE 40 MG/ML
80 INJECTION, SUSPENSION INTRA-ARTICULAR; INTRAMUSCULAR ONCE
Status: COMPLETED | OUTPATIENT
Start: 2023-10-31 | End: 2023-10-31

## 2023-10-31 RX ADMIN — TRIAMCINOLONE ACETONIDE 80 MG: 40 INJECTION, SUSPENSION INTRA-ARTICULAR; INTRAMUSCULAR at 12:50

## 2023-10-31 NOTE — PROGRESS NOTES
QUICK REFERENCE INFORMATION:  The ABCs of the Annual Wellness Visit    Subsequent Medicare Wellness Visit    HEALTH RISK ASSESSMENT    1940    Recent Hospitalizations:  No hospitalization(s) within the last year..        Current Medical Providers:  Patient Care Team:  Kyle Piedra MD as PCP - General  Kyle Piedra MD as PCP - Family Medicine  Kyle Piedra MD as Referring Physician (Internal Medicine)  Shabbir Askew MD as Consulting Physician (Ophthalmology)  Charanjit Rousseau MD as Consulting Physician (Orthopedic Surgery)  Aroldo Wiggins MD as Consulting Physician (Ophthalmology)  Haylie Harris APRN as Nurse Practitioner (Nurse Practitioner)        Smoking Status:  Social History     Tobacco Use   Smoking Status Never   Smokeless Tobacco Never       Alcohol Consumption:  Social History     Substance and Sexual Activity   Alcohol Use Yes    Comment: occasional       Depression Screen:       10/31/2023    11:58 AM   PHQ-2/PHQ-9 Depression Screening   Little Interest or Pleasure in Doing Things 0-->not at all   Feeling Down, Depressed or Hopeless 0-->not at all   PHQ-9: Brief Depression Severity Measure Score 0       Health Habits and Functional and Cognitive Screening:      10/31/2023    11:54 AM   Functional & Cognitive Status   Do you have difficulty preparing food and eating? No   Do you have difficulty bathing yourself, getting dressed or grooming yourself? No   Do you have difficulty using the toilet? No   Do you have difficulty moving around from place to place? No   Do you have trouble with steps or getting out of a bed or a chair? No   Current Diet Well Balanced Diet   Dental Exam Up to date   Eye Exam Up to date   Exercise (times per week) 0 times per week   Current Exercises Include No Regular Exercise   Do you need help using the phone?  No   Are you deaf or do you have serious difficulty hearing?  Yes   Do you need help to go to places out  "of walking distance? No   Do you need help shopping? No   Do you need help preparing meals?  No   Do you need help with housework?  No   Do you need help with laundry? No   Do you need help taking your medications? No   Do you need help managing money? No   Do you ever drive or ride in a car without wearing a seat belt? No   Have you felt unusual stress, anger or loneliness in the last month? No   Who do you live with? Alone   If you need help, do you have trouble finding someone available to you? No   Have you been bothered in the last four weeks by sexual problems? No   Do you have difficulty concentrating, remembering or making decisions? No       Fall Risk Screen:  [facility], and patient is at LOW risk for falls.Assessment completed on:10/31/2023    ACE III MINI        Does the patient have evidence of cognitive impairment? No    Aspirin use counseling: Does not need ASA (and currently is not on it)    Recent Lab Results:  CMP:  Lab Results   Component Value Date    BUN 14 10/31/2023    CREATININE 0.73 10/31/2023    EGFRIFNONA 91 09/13/2021    EGFRIFAFRI 90 06/05/2023    BCR 19.2 10/31/2023     10/31/2023    K 4.3 10/31/2023    CO2 23.0 10/31/2023    CALCIUM 9.9 10/31/2023    ALBUMIN 4.4 10/31/2023    BILITOT 0.8 10/31/2023    ALKPHOS 81 10/31/2023    AST 17 10/31/2023    ALT 17 10/31/2023     HbA1c:  No results found for: \"HGBA1C\"  Microalbumin:  Lab Results   Component Value Date    MICROALBUR 4.7 10/31/2023     Lipid Panel  Lab Results   Component Value Date    CHOL 216 (H) 10/31/2023    TRIG 131 10/31/2023    HDL 43 10/31/2023     (H) 10/31/2023    AST 17 10/31/2023    ALT 17 10/31/2023       Visual Acuity:  No results found.    Age-appropriate Screening Schedule:  Refer to the list below for future screening recommendations based on patient's age, sex and/or medical conditions. Orders for these recommended tests are listed in the plan section. The patient has " been provided with a written plan.    Health Maintenance   Topic Date Due    ZOSTER VACCINE (1 of 2) Never done    DXA SCAN  06/01/2018    TDAP/TD VACCINES (2 - Td or Tdap) 10/25/2022    COVID-19 Vaccine (6 - 2023-24 season) 09/01/2023    ANNUAL WELLNESS VISIT  10/13/2023    BMI FOLLOWUP  10/13/2023    LIPID PANEL  10/31/2024    INFLUENZA VACCINE  Completed    Pneumococcal Vaccine 65+  Completed        Subjective   History of Present Illness    Heidi Youssef is a 82 y.o. female who presents for a Subsequent Wellness Visit and for follow-up of hypertension and hyperlipidemia.    Vestibular schwannoma  The patient was told that she had a tumor on the nerve in the inner ear. She was referred to a neuro-ophthalmologist, Dr. Miranda, who ordered an MRI. She was then referred to an ENT, Dr. David Fritz. She was told to have another MRI in 01/2024 to see if it has changed. She has been feeling unsteady gait, which affects her equilibrium. She denies it has been worsening since the MRI. She has had a dull pain inferior to her ear. She wakes up a lot when she has been outside with congestion, but it goes away after she wakes up.    Hypertension  The patient checks her blood pressure at home. She did more when she had 9 months of irregular heartbeat. She wonders if she had COVID-19 and that caused that because she denies having it anymore.    Neuropathy  The patient had a steroid injection in 04/2023 and reports effectiveness and being able to sleep through the night for 3 months; however, she reports feeling it coming back in her upper and lower extremities. She reports paresthesia like with carpal tunnel in her hands and fingers. She wakes up hurting like a carpal tunnel. She reports experiencing discomfort in her lower extremity, knee, and her arms. She would like to have a steroid injection today. She is gaining weight because it wakes her up every night. She is awake for 1 to 2 hours. She has deformed toes from  arthritis. She reports fatigue. She states that it is not as bad as it was before.      CHRONIC CONDITIONS   Hypertension and hyperlipidemia    The following portions of the patient's history were reviewed and updated as appropriate: allergies, current medications, past family history, past medical history, past social history, past surgical history, and problem list.    Outpatient Medications Prior to Visit   Medication Sig Dispense Refill    acetaminophen (TYLENOL) 650 MG 8 hr tablet Take 1 tablet by mouth Every 12 (Twelve) Hours As Needed for Moderate Pain.      Cyanocobalamin (VITAMIN B-12 PO) Take 1,000 mcg by mouth Daily.      Histamine Dihydrochloride (AUSTRALIAN DREAM ARTHRITIS EX) Apply  topically every night at bedtime.      latanoprost (XALATAN) 0.005 % ophthalmic solution Apply 1 drop to eye(s) as directed by provider Daily. Left eye      losartan (COZAAR) 25 MG tablet TAKE 1 TABLET BY MOUTH DAILY 30 tablet 11    Misc Natural Products (GLUCOSAMINE CHONDROITIN TRIPLE) tablet Take 2 tablets by mouth Daily.      Multiple Vitamins-Minerals (PRESERVISION AREDS 2+MULTI VIT) capsule Take 2 capsules by mouth Daily.      polyethyl glycol-propyl glycol (SYSTANE) 0.4-0.3 % solution ophthalmic solution (artificial tears) Administer 1 drop to both eyes 4 (Four) Times a Day.       Facility-Administered Medications Prior to Visit   Medication Dose Route Frequency Provider Last Rate Last Admin    cyanocobalamin injection 1,000 mcg  1,000 mcg Intramuscular Weekly Kyle Piedra MD   1,000 mcg at 06/13/23 1322       Patient Active Problem List   Diagnosis    Primary hypertension    Paresthesias in right hand    Paresthesias in left hand    Mixed hyperlipidemia    Sebaceous cyst    Osteoarthritis    Obesity    Annual physical exam    Chronic bilateral low back pain with right-sided sciatica    Neck pain    Fatigue    Medicare annual wellness visit, subsequent    Irregular heartbeat    Vitamin D deficiency    PAC  (premature atrial contraction)       Advance Care Planning:  ACP discussion was held with the patient during this visit. Patient has an advance directive (not in EMR), copy requested.    Identification of Risk Factors:  Risk factors include: Advance Directive Discussion.    Review of Systems   Constitutional:  Negative for chills, fatigue and fever.   HENT:  Negative for congestion, ear pain and sinus pressure.    Respiratory:  Negative for cough, chest tightness, shortness of breath and wheezing.    Cardiovascular:  Negative for chest pain and palpitations.   Gastrointestinal:  Negative for abdominal pain, blood in stool and constipation.   Skin:  Negative for color change.   Allergic/Immunologic: Negative for environmental allergies.   Neurological:  Negative for dizziness, speech difficulty and headaches.   Psychiatric/Behavioral:  Negative for confusion. The patient is not nervous/anxious.        Compared to one year ago, the patient feels her physical health is the same.  Compared to one year ago, the patient feels her mental health is the same.    Objective     Physical Exam  Constitutional:       Appearance: Normal appearance. She is obese.      Comments: She is overweight.   HENT:      Head: Normocephalic and atraumatic.      Right Ear: Tympanic membrane and ear canal normal.      Left Ear: Ear canal normal.      Mouth/Throat:      Pharynx: Oropharynx is clear. No posterior oropharyngeal erythema.   Eyes:      Extraocular Movements: Extraocular movements intact.      Pupils: Pupils are equal, round, and reactive to light.   Neck:      Vascular: No carotid bruit.   Cardiovascular:      Rate and Rhythm: Normal rate and regular rhythm.      Comments:  There is a 2/6 systolic ejection murmur.  Pulmonary:      Effort: Pulmonary effort is normal.      Breath sounds: Normal breath sounds.   Abdominal:      General: Bowel sounds are normal.      Palpations: Abdomen is soft.      Tenderness: There is no abdominal  "tenderness.   Musculoskeletal:      Cervical back: Normal range of motion and neck supple.   Skin:     General: Skin is warm and dry.   Neurological:      Mental Status: She is alert and oriented to person, place, and time.      Cranial Nerves: No cranial nerve deficit.      Gait: Gait normal.   Psychiatric:         Mood and Affect: Mood normal.         Behavior: Behavior normal.          Procedures     Vitals:    10/31/23 1201   BP: 146/82   BP Location: Right arm   Patient Position: Sitting   Cuff Size: Adult   Pulse: 72   Temp: 98.2 °F (36.8 °C)   Weight: 76.3 kg (168 lb 3.2 oz)   Height: 159.9 cm (62.95\")   PainSc:   3   PainLoc: Neck  Comment: below right ear       BMI is >= 25 and <30. (Overweight) The following options were offered after discussion;: exercise counseling/recommendations and nutrition counseling/recommendations      Assessment & Plan   Problem List Items Addressed This Visit          Cardiac and Vasculature    Primary hypertension    Overview     Continue losartan 25mg daily.         Relevant Medications    losartan (COZAAR) 25 MG tablet    Other Relevant Orders    CBC & Differential (Completed)    Microalbumin / Creatinine Urine Ratio - Urine, Clean Catch (Completed)    Mixed hyperlipidemia    Overview     Check lipid panel today.          Relevant Orders    Comprehensive Metabolic Panel (Completed)    Lipid Panel (Completed)       Endocrine and Metabolic    Vitamin D deficiency    Relevant Orders    Vitamin D,25-Hydroxy (Completed)       Health Encounters    Medicare annual wellness visit, subsequent - Primary    Overview     Current on immunizations. Current on age recommended screenings.  Fasting labs today.            Symptoms and Signs    Paresthesias in right hand    Relevant Medications    triamcinolone acetonide (KENALOG-40) injection 80 mg (Completed)    Paresthesias in left hand    Relevant Medications    triamcinolone acetonide (KENALOG-40) injection 80 mg (Completed)     Other " Visit Diagnoses       B12 deficiency        Relevant Orders    Vitamin B12 (Completed)        1. Prevention  - The new diagnosis of a vestibular schwannoma is now the cause of her vertigo. She had influenza vaccine today. We discussed COVID-19 and RSV.    2. Hypertension  - Blood pressure is controlled on losartan.    3. Hyperlipidemia  - Lipid panel is pending. The treatment remains healthy diet.    4. Vitamin D deficiency  - Vitamin D level is pending. Continue supplemental vitamin D.    5.Vitamin B12 deficiency  - Continue vitamin B12 replacement.    6. Paresthesia in the hands and feet  - Surprisingly at the last visit, this responded to steroids that were given to her for her vertigo did not help the vertigo, but her paresthesia resolved for several months. Neuropathy work-up was otherwise negative, so we will empirically go ahead and give another dose of steroids.    She will follow up in 1 year.      Patient Self-Management and Personalized Health Advice  The patient has been provided with information about: diet, exercise, and weight management and preventive services including:   Annual Wellness Visit (AWV).    Outpatient Encounter Medications as of 10/31/2023   Medication Sig Dispense Refill    acetaminophen (TYLENOL) 650 MG 8 hr tablet Take 1 tablet by mouth Every 12 (Twelve) Hours As Needed for Moderate Pain.      Cyanocobalamin (VITAMIN B-12 PO) Take 1,000 mcg by mouth Daily.      Histamine Dihydrochloride (AUSTRALIAN DREAM ARTHRITIS EX) Apply  topically every night at bedtime.      latanoprost (XALATAN) 0.005 % ophthalmic solution Apply 1 drop to eye(s) as directed by provider Daily. Left eye      losartan (COZAAR) 25 MG tablet TAKE 1 TABLET BY MOUTH DAILY 30 tablet 11    Misc Natural Products (GLUCOSAMINE CHONDROITIN TRIPLE) tablet Take 2 tablets by mouth Daily.      Multiple Vitamins-Minerals (PRESERVISION AREDS 2+MULTI VIT) capsule Take 2 capsules by mouth Daily.      polyethyl glycol-propyl glycol  (SYSTANE) 0.4-0.3 % solution ophthalmic solution (artificial tears) Administer 1 drop to both eyes 4 (Four) Times a Day.       Facility-Administered Encounter Medications as of 10/31/2023   Medication Dose Route Frequency Provider Last Rate Last Admin    cyanocobalamin injection 1,000 mcg  1,000 mcg Intramuscular Weekly Kyle Piedra MD   1,000 mcg at 06/13/23 1327    [COMPLETED] triamcinolone acetonide (KENALOG-40) injection 80 mg  80 mg Intramuscular Once Kyle Piedra MD   80 mg at 10/31/23 1250       Reviewed use of high risk medication in the elderly: yes  Reviewed for potential of harmful drug interactions in the elderly: yes    Follow Up:  Return in about 1 year (around 10/31/2024) for Medicare Wellness.     There are no Patient Instructions on file for this visit.    An After Visit Summary and PPPS with all of these plans were given to the patient.      Transcribed from ambient dictation for Kyle Piedra MD by Keegan Carlos.  10/31/23   14:09 EDT    Patient or patient representative verbalized consent to the visit recording.  I have personally performed the services described in this document as transcribed by the above individual, and it is both accurate and complete.

## 2023-11-01 LAB
25(OH)D3 SERPL-MCNC: 39.7 NG/ML (ref 30–100)
ALBUMIN UR-MCNC: 4.7 MG/DL
CREAT UR-MCNC: 89.5 MG/DL
MICROALBUMIN/CREAT UR: 52.5 MG/G (ref 0–29)
VIT B12 BLD-MCNC: 1177 PG/ML (ref 211–946)

## 2024-08-19 RX ORDER — LOSARTAN POTASSIUM 25 MG/1
25 TABLET ORAL DAILY
Qty: 30 TABLET | Refills: 11 | Status: SHIPPED | OUTPATIENT
Start: 2024-08-19

## 2024-08-19 NOTE — TELEPHONE ENCOUNTER
Rx Refill Note  Requested Prescriptions     Pending Prescriptions Disp Refills    losartan (COZAAR) 25 MG tablet [Pharmacy Med Name: LOSARTAN 25MG TABLETS] 30 tablet 11     Sig: TAKE 1 TABLET BY MOUTH DAILY      Last office visit with prescribing clinician: 10/31/2023   Last telemedicine visit with prescribing clinician: Visit date not found   Next office visit with prescribing clinician: 11/4/2024                         Would you like a call back once the refill request has been completed: [] Yes [] No    If the office needs to give you a call back, can they leave a voicemail: [] Yes [] No    Selma Powell MA  08/19/24, 16:30 EDT

## 2024-09-17 RX ORDER — LOSARTAN POTASSIUM 25 MG/1
25 TABLET ORAL DAILY
Qty: 30 TABLET | Refills: 11 | Status: SHIPPED | OUTPATIENT
Start: 2024-09-17

## 2024-10-18 ENCOUNTER — FLU SHOT (OUTPATIENT)
Dept: INTERNAL MEDICINE | Facility: CLINIC | Age: 84
End: 2024-10-18
Payer: MEDICARE

## 2024-10-18 DIAGNOSIS — Z23 NEED FOR INFLUENZA VACCINATION: Primary | ICD-10-CM

## 2024-10-18 PROCEDURE — 90662 IIV NO PRSV INCREASED AG IM: CPT | Performed by: INTERNAL MEDICINE

## 2024-10-18 PROCEDURE — G0008 ADMIN INFLUENZA VIRUS VAC: HCPCS | Performed by: INTERNAL MEDICINE

## 2024-10-22 ENCOUNTER — PATIENT OUTREACH (OUTPATIENT)
Dept: CASE MANAGEMENT | Facility: OTHER | Age: 84
End: 2024-10-22
Payer: MEDICARE

## 2024-10-22 DIAGNOSIS — I10 PRIMARY HYPERTENSION: Primary | ICD-10-CM

## 2024-10-22 NOTE — OUTREACH NOTE
[facility] Enrollment    Date/Time of successful contact: 10/22/2024  8:35 AM  Patient response: not interested  Enrolling provider: Kyle Piedra MD Ginger S  Ambulatory Case Management    10/22/2024, 09:16 EDT

## 2024-11-04 ENCOUNTER — LAB (OUTPATIENT)
Dept: LAB | Facility: HOSPITAL | Age: 84
End: 2024-11-04
Payer: MEDICARE

## 2024-11-04 ENCOUNTER — OFFICE VISIT (OUTPATIENT)
Dept: INTERNAL MEDICINE | Facility: CLINIC | Age: 84
End: 2024-11-04
Payer: MEDICARE

## 2024-11-04 ENCOUNTER — PATIENT OUTREACH (OUTPATIENT)
Dept: CASE MANAGEMENT | Facility: OTHER | Age: 84
End: 2024-11-04
Payer: MEDICARE

## 2024-11-04 VITALS
HEART RATE: 74 BPM | DIASTOLIC BLOOD PRESSURE: 78 MMHG | WEIGHT: 166.8 LBS | HEIGHT: 63 IN | OXYGEN SATURATION: 99 % | BODY MASS INDEX: 29.55 KG/M2 | SYSTOLIC BLOOD PRESSURE: 118 MMHG | TEMPERATURE: 97.7 F

## 2024-11-04 DIAGNOSIS — E55.9 VITAMIN D DEFICIENCY: ICD-10-CM

## 2024-11-04 DIAGNOSIS — G62.9 NEUROPATHY: ICD-10-CM

## 2024-11-04 DIAGNOSIS — E78.2 MIXED HYPERLIPIDEMIA: ICD-10-CM

## 2024-11-04 DIAGNOSIS — R53.83 OTHER FATIGUE: ICD-10-CM

## 2024-11-04 DIAGNOSIS — I10 PRIMARY HYPERTENSION: ICD-10-CM

## 2024-11-04 DIAGNOSIS — I10 PRIMARY HYPERTENSION: Primary | ICD-10-CM

## 2024-11-04 DIAGNOSIS — Z00.00 PREVENTATIVE HEALTH CARE: Primary | ICD-10-CM

## 2024-11-04 LAB
25(OH)D3 SERPL-MCNC: 40.8 NG/ML (ref 30–100)
ALBUMIN SERPL-MCNC: 4 G/DL (ref 3.5–5.2)
ALBUMIN UR-MCNC: 6.5 MG/DL
ALBUMIN/GLOB SERPL: 1.3 G/DL
ALP SERPL-CCNC: 84 U/L (ref 39–117)
ALT SERPL W P-5'-P-CCNC: 15 U/L (ref 1–33)
ANION GAP SERPL CALCULATED.3IONS-SCNC: 11 MMOL/L (ref 5–15)
AST SERPL-CCNC: 19 U/L (ref 1–32)
BASOPHILS # BLD AUTO: 0.07 10*3/MM3 (ref 0–0.2)
BASOPHILS NFR BLD AUTO: 1 % (ref 0–1.5)
BILIRUB SERPL-MCNC: 0.5 MG/DL (ref 0–1.2)
BUN SERPL-MCNC: 15 MG/DL (ref 8–23)
BUN/CREAT SERPL: 24.6 (ref 7–25)
CALCIUM SPEC-SCNC: 8.9 MG/DL (ref 8.6–10.5)
CHLORIDE SERPL-SCNC: 108 MMOL/L (ref 98–107)
CHOLEST SERPL-MCNC: 204 MG/DL (ref 0–200)
CO2 SERPL-SCNC: 19 MMOL/L (ref 22–29)
CREAT SERPL-MCNC: 0.61 MG/DL (ref 0.57–1)
CREAT UR-MCNC: 154.4 MG/DL
DEPRECATED RDW RBC AUTO: 42.4 FL (ref 37–54)
EGFRCR SERPLBLD CKD-EPI 2021: 88.8 ML/MIN/1.73
EOSINOPHIL # BLD AUTO: 0.05 10*3/MM3 (ref 0–0.4)
EOSINOPHIL NFR BLD AUTO: 0.7 % (ref 0.3–6.2)
ERYTHROCYTE [DISTWIDTH] IN BLOOD BY AUTOMATED COUNT: 12.8 % (ref 12.3–15.4)
GLOBULIN UR ELPH-MCNC: 3.1 GM/DL
GLUCOSE SERPL-MCNC: 95 MG/DL (ref 65–99)
HCT VFR BLD AUTO: 41.8 % (ref 34–46.6)
HDLC SERPL-MCNC: 38 MG/DL (ref 40–60)
HGB BLD-MCNC: 13.9 G/DL (ref 12–15.9)
IMM GRANULOCYTES # BLD AUTO: 0.02 10*3/MM3 (ref 0–0.05)
IMM GRANULOCYTES NFR BLD AUTO: 0.3 % (ref 0–0.5)
LDLC SERPL CALC-MCNC: 141 MG/DL (ref 0–100)
LDLC/HDLC SERPL: 3.64 {RATIO}
LYMPHOCYTES # BLD AUTO: 2.14 10*3/MM3 (ref 0.7–3.1)
LYMPHOCYTES NFR BLD AUTO: 30.4 % (ref 19.6–45.3)
MCH RBC QN AUTO: 30.5 PG (ref 26.6–33)
MCHC RBC AUTO-ENTMCNC: 33.3 G/DL (ref 31.5–35.7)
MCV RBC AUTO: 91.9 FL (ref 79–97)
MICROALBUMIN/CREAT UR: 42.1 MG/G (ref 0–29)
MONOCYTES # BLD AUTO: 0.54 10*3/MM3 (ref 0.1–0.9)
MONOCYTES NFR BLD AUTO: 7.7 % (ref 5–12)
NEUTROPHILS NFR BLD AUTO: 4.22 10*3/MM3 (ref 1.7–7)
NEUTROPHILS NFR BLD AUTO: 59.9 % (ref 42.7–76)
NRBC BLD AUTO-RTO: 0 /100 WBC (ref 0–0.2)
PLATELET # BLD AUTO: 382 10*3/MM3 (ref 140–450)
PMV BLD AUTO: 10.2 FL (ref 6–12)
POTASSIUM SERPL-SCNC: 4.1 MMOL/L (ref 3.5–5.2)
PROT SERPL-MCNC: 7.1 G/DL (ref 6–8.5)
RBC # BLD AUTO: 4.55 10*6/MM3 (ref 3.77–5.28)
SODIUM SERPL-SCNC: 138 MMOL/L (ref 136–145)
TRIGL SERPL-MCNC: 138 MG/DL (ref 0–150)
TSH SERPL DL<=0.05 MIU/L-ACNC: 2.53 UIU/ML (ref 0.27–4.2)
VIT B12 BLD-MCNC: 799 PG/ML (ref 211–946)
VLDLC SERPL-MCNC: 25 MG/DL (ref 5–40)
WBC NRBC COR # BLD AUTO: 7.04 10*3/MM3 (ref 3.4–10.8)

## 2024-11-04 PROCEDURE — 1126F AMNT PAIN NOTED NONE PRSNT: CPT | Performed by: INTERNAL MEDICINE

## 2024-11-04 PROCEDURE — 82306 VITAMIN D 25 HYDROXY: CPT

## 2024-11-04 PROCEDURE — 96372 THER/PROPH/DIAG INJ SC/IM: CPT | Performed by: INTERNAL MEDICINE

## 2024-11-04 PROCEDURE — 82607 VITAMIN B-12: CPT

## 2024-11-04 PROCEDURE — 82043 UR ALBUMIN QUANTITATIVE: CPT

## 2024-11-04 PROCEDURE — G0439 PPPS, SUBSEQ VISIT: HCPCS | Performed by: INTERNAL MEDICINE

## 2024-11-04 PROCEDURE — 1170F FXNL STATUS ASSESSED: CPT | Performed by: INTERNAL MEDICINE

## 2024-11-04 PROCEDURE — 1159F MED LIST DOCD IN RCRD: CPT | Performed by: INTERNAL MEDICINE

## 2024-11-04 PROCEDURE — 82570 ASSAY OF URINE CREATININE: CPT

## 2024-11-04 PROCEDURE — 80061 LIPID PANEL: CPT

## 2024-11-04 PROCEDURE — 85025 COMPLETE CBC W/AUTO DIFF WBC: CPT

## 2024-11-04 PROCEDURE — 3078F DIAST BP <80 MM HG: CPT | Performed by: INTERNAL MEDICINE

## 2024-11-04 PROCEDURE — 1160F RVW MEDS BY RX/DR IN RCRD: CPT | Performed by: INTERNAL MEDICINE

## 2024-11-04 PROCEDURE — 80053 COMPREHEN METABOLIC PANEL: CPT

## 2024-11-04 PROCEDURE — 84443 ASSAY THYROID STIM HORMONE: CPT

## 2024-11-04 PROCEDURE — 3074F SYST BP LT 130 MM HG: CPT | Performed by: INTERNAL MEDICINE

## 2024-11-04 RX ORDER — TRIAMCINOLONE ACETONIDE 40 MG/ML
80 INJECTION, SUSPENSION INTRA-ARTICULAR; INTRAMUSCULAR ONCE
Status: COMPLETED | OUTPATIENT
Start: 2024-11-04 | End: 2024-11-04

## 2024-11-04 RX ADMIN — TRIAMCINOLONE ACETONIDE 80 MG: 40 INJECTION, SUSPENSION INTRA-ARTICULAR; INTRAMUSCULAR at 12:25

## 2024-11-04 NOTE — PROGRESS NOTES
Subjective   The ABCs of the Annual Wellness Visit  Medicare Wellness Visit      Heidi Youssef is a 84 y.o. patient who presents for a Medicare Wellness Visit.    The following portions of the patient's history were reviewed and   updated as appropriate: allergies, current medications, past family history, past medical history, past social history, past surgical history, and problem list.    Compared to one year ago, the patient's physical   health is the same.  Compared to one year ago, the patient's mental   health is the same.    Recent Hospitalizations:  She was not admitted to the hospital during the last year.     Current Medical Providers:  Patient Care Team:  Kyle Piedra MD as PCP - General  Kyle Piedra MD as PCP - Family Medicine  Kyle Piedra MD as Referring Physician (Internal Medicine)  Shabbir Askew MD as Consulting Physician (Ophthalmology)  Charanjit Rousseau MD as Consulting Physician (Orthopedic Surgery)  Aroldo Wiggins MD as Consulting Physician (Ophthalmology)  Haylie Harris APRN as Nurse Practitioner (Nurse Practitioner)  Kerry Caal RN as Ambulatory  ([facility])    Outpatient Medications Prior to Visit   Medication Sig Dispense Refill    acetaminophen (TYLENOL) 650 MG 8 hr tablet Take 1 tablet by mouth Every 12 (Twelve) Hours As Needed for Moderate Pain.      Cyanocobalamin (VITAMIN B-12 PO) Take 1,000 mcg by mouth Daily. (Patient taking differently: Take 500 mg by mouth Daily.)      latanoprost (XALATAN) 0.005 % ophthalmic solution Apply 1 drop to eye(s) as directed by provider Daily. Left eye      losartan (COZAAR) 25 MG tablet TAKE 1 TABLET BY MOUTH DAILY 30 tablet 11    Misc Natural Products (GLUCOSAMINE CHONDROITIN TRIPLE) tablet Take 2 tablets by mouth Daily.      Multiple Vitamins-Minerals (PRESERVISION AREDS 2+MULTI VIT) capsule Take 2 capsules by mouth Daily.      polyethyl glycol-propyl glycol  "(SYSTANE) 0.4-0.3 % solution ophthalmic solution (artificial tears) Administer 1 drop to both eyes 4 (Four) Times a Day.      Histamine Dihydrochloride (AUSTRALIAN DREAM ARTHRITIS EX) Apply  topically every night at bedtime. (Patient not taking: Reported on 11/4/2024)       Facility-Administered Medications Prior to Visit   Medication Dose Route Frequency Provider Last Rate Last Admin    cyanocobalamin injection 1,000 mcg  1,000 mcg Intramuscular Weekly Kyle Piedra MD   1,000 mcg at 06/13/23 1327     No opioid medication identified on active medication list. I have reviewed chart for other potential  high risk medication/s and harmful drug interactions in the elderly.      Aspirin is not on active medication list.  Aspirin use is not indicated based on review of current medical condition/s. Risk of harm outweighs potential benefits.  .    Patient Active Problem List   Diagnosis    Primary hypertension    Paresthesias in right hand    Paresthesias in left hand    Mixed hyperlipidemia    Sebaceous cyst    Osteoarthritis    Obesity    Annual physical exam    Chronic bilateral low back pain with right-sided sciatica    Neck pain    Fatigue    Medicare annual wellness visit, subsequent    Irregular heartbeat    Vitamin D deficiency    PAC (premature atrial contraction)     Advance Care Planning Advance Directive is not on file.  ACP discussion was held with the patient during this visit. Patient has an advance directive (not in EMR), copy requested.            Objective   Vitals:    11/04/24 1019   BP: 118/78   BP Location: Left arm   Patient Position: Sitting   Cuff Size: Adult   Pulse: 74   Temp: 97.7 °F (36.5 °C)   TempSrc: Infrared   SpO2: 99%   Weight: 75.7 kg (166 lb 12.8 oz)   Height: 160 cm (63\")   PainSc: 0-No pain       Estimated body mass index is 29.55 kg/m² as calculated from the following:    Height as of this encounter: 160 cm (63\").    Weight as of this encounter: 75.7 kg (166 lb 12.8 " oz).    BMI is >= 25 and <30. (Overweight) The following options were offered after discussion;: exercise counseling/recommendations and nutrition counseling/recommendations       Does the patient have evidence of cognitive impairment? No  Lab Results   Component Value Date    TRIG 138 2024    HDL 38 (L) 2024     (H) 2024    VLDL 25 2024                                                                                                Health  Risk Assessment    Smoking Status:  Social History     Tobacco Use   Smoking Status Never   Smokeless Tobacco Never     Alcohol Consumption:  Social History     Substance and Sexual Activity   Alcohol Use Yes    Comment: occasional       Fall Risk Screen  STEADI Fall Risk Assessment was completed, and patient is at LOW risk for falls.Assessment completed on:2024    Depression Screenin/4/2024    10:18 AM   PHQ-2/PHQ-9 Depression Screening   Little interest or pleasure in doing things Not at all   Feeling down, depressed, or hopeless Not at all     Health Habits and Functional and Cognitive Screenin/4/2024    10:16 AM   Functional & Cognitive Status   Do you have difficulty preparing food and eating? No   Do you have difficulty bathing yourself, getting dressed or grooming yourself? No   Do you have difficulty using the toilet? No   Do you have difficulty moving around from place to place? No   Do you have trouble with steps or getting out of a bed or a chair? No   Current Diet Well Balanced Diet   Dental Exam Up to date   Eye Exam Up to date   Exercise (times per week) 0 times per week   Current Exercises Include No Regular Exercise   Do you need help using the phone?  No   Are you deaf or do you have serious difficulty hearing?  Yes   Do you need help to go to places out of walking distance? No   Do you need help shopping? No   Do you need help preparing meals?  No   Do you need help with housework?  No   Do you need help with  laundry? No   Do you need help taking your medications? No   Do you need help managing money? No   Do you ever drive or ride in a car without wearing a seat belt? No   Have you felt unusual stress, anger or loneliness in the last month? No   Who do you live with? Alone   If you need help, do you have trouble finding someone available to you? No   Have you been bothered in the last four weeks by sexual problems? No   Do you have difficulty concentrating, remembering or making decisions? No           Age-appropriate Screening Schedule:  Refer to the list below for future screening recommendations based on patient's age, sex and/or medical conditions. Orders for these recommended tests are listed in the plan section. The patient has been provided with a written plan.    Health Maintenance List  Health Maintenance   Topic Date Due    ZOSTER VACCINE (1 of 2) Never done    RSV Vaccine - Adults (1 - 1-dose 75+ series) Never done    DXA SCAN  06/01/2018    TDAP/TD VACCINES (2 - Td or Tdap) 10/25/2022    COVID-19 Vaccine (7 - 2024-25 season) 09/01/2024    ANNUAL WELLNESS VISIT  10/31/2024    BMI FOLLOWUP  10/31/2024    LIPID PANEL  11/04/2025    INFLUENZA VACCINE  Completed    Pneumococcal Vaccine 65+  Completed                                                                                                                                                CMS Preventative Services Quick Reference  Risk Factors Identified During Encounter  None Identified    The above risks/problems have been discussed with the patient.  Pertinent information has been shared with the patient in the After Visit Summary.  An After Visit Summary and PPPS were made available to the patient.    Follow Up:   Next Medicare Wellness visit to be scheduled in 1 year.         Additional E&M Note during same encounter follows:  Patient has additional, significant, and separately identifiable condition(s)/problem(s) that require work above and beyond the  Medicare Wellness Visit     Chief Complaint  Medicare Wellness-subsequent and Hypertension    Subjective    HPI         The patient is an 84-year-old female who comes in for a subsequent Medicare annual wellness examination and follow-up of hyperlipidemia and hypertension. She continues to see ENT at the [facility] for vestibular schwannoma.    She reports experiencing fatigue episodes in the afternoons since 09/2024. She has been monitoring her blood pressure, which she notes has been low, with a reading of 112/48 yesterday. Her pulse remains in the 70s during these episodes. She also mentions an irregular heartbeat and recalls having Premature Atrial Contractions (PACs) and Premature Ventricular Contractions (PVCs) a few years ago. She experienced a vasovagal episode 2 years ago. She takes her losartan medication in the morning and wonders if it could be causing her low blood pressure. She also experiences shortness of breath when her blood pressure drops, which improves after resting or sleeping. She did not take her losartan this morning and her blood pressure was normal.    She reports persistent swelling in her right ankle, which is more pronounced than her left. She also mentions discomfort in her hip but has not sought medical attention for it as it is not severe. She had a partial knee replacement in the past. She is concerned about a possible blockage in her vein. She first noticed the swelling last year and it has been consistent since then. She maintains a low-salt diet.    She reports that her carpal tunnel syndrome has returned, causing her to wake up frequently. She received a steroid injection last year which provided relief for a long time, but the symptoms returned this summer. She also has a pinched nerve in her neck.    She reports that her equilibrium has not changed, but she feels unsteady while walking. She also mentions a slight decrease in her hearing, which she attributes to  "aging.    FAMILY HISTORY  Her brother  this summer due to heart issues.       No opioid medication identified on active medication list. I have reviewed chart for other potential  high risk medication/s and harmful drug interactions in the elderly.       Objective   Vital Signs:  /78 (BP Location: Left arm, Patient Position: Sitting, Cuff Size: Adult)   Pulse 74   Temp 97.7 °F (36.5 °C) (Infrared)   Ht 160 cm (63\")   Wt 75.7 kg (166 lb 12.8 oz)   SpO2 99%   BMI 29.55 kg/m²   Physical Exam  Vitals reviewed.   Constitutional:       Appearance: Normal appearance. She is well-developed.   HENT:      Head: Normocephalic and atraumatic.      Right Ear: Tympanic membrane and ear canal normal.      Left Ear: Tympanic membrane and ear canal normal.      Mouth/Throat:      Pharynx: Oropharynx is clear. No posterior oropharyngeal erythema.   Eyes:      Extraocular Movements: Extraocular movements intact.      Pupils: Pupils are equal, round, and reactive to light.   Neck:      Thyroid: No thyromegaly.      Vascular: No carotid bruit.   Cardiovascular:      Rate and Rhythm: Normal rate and regular rhythm.      Heart sounds: Normal heart sounds. No murmur heard.     No friction rub. No gallop.   Pulmonary:      Effort: Pulmonary effort is normal.      Breath sounds: Normal breath sounds.   Abdominal:      General: Bowel sounds are normal.      Palpations: Abdomen is soft.      Tenderness: There is no abdominal tenderness.   Musculoskeletal:      Cervical back: Normal range of motion and neck supple.      Right lower leg: No edema.      Left lower leg: No edema.   Lymphadenopathy:      Cervical: No cervical adenopathy.   Skin:     General: Skin is warm and dry.   Neurological:      Mental Status: She is alert and oriented to person, place, and time.      Cranial Nerves: No cranial nerve deficit.      Motor: No weakness.      Gait: Gait normal.   Psychiatric:         Mood and Affect: Mood normal.         Behavior: " Behavior normal.                        Assessment and Plan           1. Hyperlipidemia.  A lipid panel is pending. She is advised to continue efforts at maintaining a healthy diet.    2. Hypertension.  Blood pressure may be dropping low at times. She will hold losartan for the next week or so and continue to monitor her blood pressure. The goal for her is 140/90 and below. If she continues to have episodes of shortness of breath and weakness despite discontinuation of losartan, cardiovascular issues will need to be revisited.    3. Left lower extremity edema.  The swelling in her right leg is likely related to her previous partial knee replacement. Compression therapy is recommended if she chooses to treat it, though she may not opt for this.    4. Possible carpal tunnel syndrome in the left hand.  She reports that her carpal tunnel syndrome has returned and is waking her up at night. She was previously given a steroid shot which helped for a long time. She is given parental triamcinolone.    5. Vestibular schwannoma.  She continues to see ENT at the [facility] for vestibular schwannoma. The MRI after 6 months did not show any changes. She is advised to call if there is a sudden change in hearing or walking.      Orders Placed This Encounter   Procedures    Comprehensive Metabolic Panel     Standing Status:   Future     Number of Occurrences:   1     Standing Expiration Date:   11/4/2025     Order Specific Question:   Release to patient     Answer:   Routine Release [[id number]]    Lipid Panel     Standing Status:   Future     Number of Occurrences:   1     Standing Expiration Date:   11/4/2025     Order Specific Question:   Release to patient     Answer:   Routine Release [[id number]]    TSH     Standing Status:   Future     Number of Occurrences:   1     Standing Expiration Date:   11/4/2025     Order Specific Question:   Release to patient     Answer:   Routine Release [[id number]]    Vitamin B12      Standing Status:   Future     Number of Occurrences:   1     Standing Expiration Date:   11/4/2025     Order Specific Question:   Release to patient     Answer:   Routine Release [[id number]]    Vitamin D,25-Hydroxy     Standing Status:   Future     Number of Occurrences:   1     Standing Expiration Date:   11/4/2025     Order Specific Question:   Release to patient     Answer:   Routine Release [[id number]]    Microalbumin / Creatinine Urine Ratio - Urine, Clean Catch     Standing Status:   Future     Number of Occurrences:   1     Standing Expiration Date:   11/4/2025     Order Specific Question:   Release to patient     Answer:   Routine Release [[id number]]    CBC & Differential     Standing Status:   Future     Number of Occurrences:   1     Standing Expiration Date:   11/4/2025     Order Specific Question:   Manual Differential     Answer:   No     Order Specific Question:   Release to patient     Answer:   Routine Release [[id number]]     New Medications Ordered This Visit   Medications    triamcinolone acetonide (KENALOG-40) injection 80 mg          Follow Up   Return in about 1 year (around 11/4/2025) for Medicare Wellness.  Patient was given instructions and counseling regarding her condition or for health maintenance advice. Please see specific information pulled into the AVS if appropriate.  Patient or patient representative verbalized consent for the use of Ambient Listening during the visit with  Kyle Piedra MD for chart documentation. 11/5/2024  10:08 EST

## 2024-11-05 NOTE — OUTREACH NOTE
[facility] Enrollment    Date/Time of successful contact: 11/4/2024 10:15 AM  Patient response: interested  Enrolling provider: Kyle Piedra MD  Patient has/had own BP monitoring equipment?: yes           Kerry CERNA  Ambulatory Case Management    11/5/2024, 09:10 EST

## 2024-12-19 ENCOUNTER — PATIENT OUTREACH (OUTPATIENT)
Dept: CASE MANAGEMENT | Facility: OTHER | Age: 84
End: 2024-12-19
Payer: MEDICARE

## 2024-12-19 DIAGNOSIS — I10 PRIMARY HYPERTENSION: Primary | ICD-10-CM

## 2024-12-19 DIAGNOSIS — R53.83 FATIGUE, UNSPECIFIED TYPE: ICD-10-CM

## 2024-12-19 DIAGNOSIS — I49.9 IRREGULAR HEARTBEAT: ICD-10-CM

## 2024-12-19 DIAGNOSIS — M15.0 PRIMARY OSTEOARTHRITIS INVOLVING MULTIPLE JOINTS: ICD-10-CM

## 2024-12-19 NOTE — OUTREACH NOTE
[facility] Follow-up    Patient has decided that she is not interested in the hypertension program at this time. Patient would like to continue to monitor blood pressure and log it on paper to bring to pcp when she has a medical appointment. ACM will close program at this time.        Kerry CERNA  Ambulatory Case Management    12/19/2024, 11:13 EST

## 2024-12-19 NOTE — PLAN OF CARE
Problem: Hypertension  Goal: Make Healthy Diet Choices  Outcome: Not Progressing     Problem: Osteoarthritis  Goal: Manage Pain  Outcome: Not Progressing

## 2024-12-19 NOTE — OUTREACH NOTE
[facility] Interim Update    RN-TANYA made outreach to patient. Role and reason for call explained. Patient was pleasant and appreciative of call. Patient states that she is not interested in hypertension program, however, she is very compliant with monitoring her BP daily and writing it down. Patient continues to experience fatigue almost constantly, and SOB randomly. When she does become SOB, she takes her BP, and systolic number and HR are within normal limits, however, diastolic number is usually between 40-50. Patient thought that these symptoms would improve after Losartan was stopped on November 4th. Fatigue and being SOB did not stop, however, below the knee on the right leg is no longer swollen. Patient states that when she becomes SOB, she feels like her heart beat is irregular. Patient states that she would like to try to resolve this with medication. 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 Patient is having an MRI soon to determine treatment on ear tumor.    F/u outreach scheduled.    AMBULATORY CASE MANAGEMENT NOTE    Names and Relationships of Patient/Support Persons: Contact: Heidi Youssef; Relationship: Self -         Education Documentation  No documentation found.        Kerry CERNA  Ambulatory Case Management    12/19/2024, 16:59 EST

## 2024-12-31 ENCOUNTER — PATIENT OUTREACH (OUTPATIENT)
Dept: CASE MANAGEMENT | Facility: OTHER | Age: 84
End: 2024-12-31
Payer: MEDICARE

## 2024-12-31 DIAGNOSIS — M15.0 PRIMARY OSTEOARTHRITIS INVOLVING MULTIPLE JOINTS: ICD-10-CM

## 2024-12-31 DIAGNOSIS — I10 PRIMARY HYPERTENSION: Primary | ICD-10-CM

## 2024-12-31 DIAGNOSIS — I49.9 IRREGULAR HEARTBEAT: ICD-10-CM

## 2025-01-08 NOTE — OUTREACH NOTE
[facility] End of Month Documentation    This Chronic Medical Management Care Plan for Heidi Youssef, 84 y.o. female, has been a new plan of care implemented; established and a new plan of care implemented for the month of December.  A cumulative time of 50  minutes was spent on this patient record this month, including electronic communication with primary care provider; phone call with patient.    Regarding the patient's problems: has Primary hypertension; Paresthesias in right hand; Paresthesias in left hand; Mixed hyperlipidemia; Sebaceous cyst; Osteoarthritis; Obesity; Annual physical exam; Chronic bilateral low back pain with right-sided sciatica; Neck pain; Fatigue; Medicare annual wellness visit, subsequent; Irregular heartbeat; Vitamin D deficiency; and PAC (premature atrial contraction) on their problem list., the following items were addressed: medications and any changes can be found within the plan section of the note.  A detailed listing of time spent for chronic care management is tracked within each outreach encounter.  Current medications include:  has a current medication list which includes the following prescription(s): acetaminophen, cyanocobalamin, latanoprost, losartan, glucosamine chondroitin triple, preservision areds 2+multi vit, and polyethyl glycol-propyl glycol, and the following Facility-Administered Medications: cyanocobalamin. and the patient is reported to be patient is compliant with medication protocol,  Medications are reported to be effective.  Regarding these diagnoses, referrals were made to the following provider(s):  .  All notes on chart for PCP to review.    The patient was monitored remotely for blood pressure; activity level.    The patient's physical needs include:  hearing care.     The patient's mental support needs include:  continued support    The patient's cognitive support needs include:  continued support; medication    The patient's psychosocial support needs include:   continued support    The patient's functional needs include: medication education    The patient's environmental needs include:  not applicable    Care Plan overall comments:  No data recorded    Refer to previous outreach notes for more information on the areas listed above.    Monthly Billing Diagnoses  (I10) Primary hypertension    (M15.0) Primary osteoarthritis involving multiple joints    (I49.9) Irregular heartbeat    Medications   Medications have been reconciled    Care Plan progress this month:      Recently Modified Care Plans Updates made since 12/8/2024 12:00 AM      No recently modified care plans.             Hypertension       Make Healthy Diet Choices (Not Progressing)       Start:  12/19/24    Expected End:  02/19/26         My Healthy Diet To Do List       Start:  12/19/24         Why is this important?  Taking small steps to change how you eat is a good place to start.             Osteoarthritis       Manage Pain (Not Progressing)       Start:  12/19/24    Expected End:  02/19/26         My Pain Management To Do List       Start:  12/19/24         Why is this important?  Day-to-day life can be hard when you have joint pain.  Pain medicine is just one piece of the treatment puzzle. There are many things you can do to manage pain and stay strong.  Lifestyle changes like stopping smoking and eating foods with Vitamin D and calcium keeps your bones and muscles healthy. Your joints are better when supported by strong muscles.  You can try these action steps to help you manage your pain.               Current Specialty Plan of Care Status signed by both patient and provider    Instructions   Patient was provided an electronic copy of care plan  CCM services were explained and offered and patient has accepted these services.  Patient has given their written consent to receive CCM services and understands that this includes the authorization of electronic communication of medical information with the  other treating providers.  Patient understands that they may stop CCM services at any time and these changes will be effective at the end of the calendar month and will effectively revocate the agreement of CCM services.  Patient understands that only one practitioner can furnish and be paid for CCM services during one calendar month.  Patient also understands that there may be co-payment or deductible fees in association with CCM services.  Patient will continue with at least monthly follow-up calls with the Ambulatory .    Kerry CERNA  Ambulatory Case Management    1/8/2025, 17:33 EST

## 2025-01-17 ENCOUNTER — OFFICE VISIT (OUTPATIENT)
Dept: INTERNAL MEDICINE | Facility: CLINIC | Age: 85
End: 2025-01-17
Payer: MEDICARE

## 2025-01-17 VITALS
WEIGHT: 161.6 LBS | HEIGHT: 63 IN | DIASTOLIC BLOOD PRESSURE: 82 MMHG | HEART RATE: 84 BPM | SYSTOLIC BLOOD PRESSURE: 140 MMHG | TEMPERATURE: 98 F | OXYGEN SATURATION: 98 % | BODY MASS INDEX: 28.63 KG/M2

## 2025-01-17 DIAGNOSIS — R05.2 SUBACUTE COUGH: ICD-10-CM

## 2025-01-17 DIAGNOSIS — J01.00 ACUTE NON-RECURRENT MAXILLARY SINUSITIS: Primary | ICD-10-CM

## 2025-01-17 RX ORDER — AZITHROMYCIN 250 MG/1
TABLET, FILM COATED ORAL
Qty: 6 TABLET | Refills: 0 | Status: SHIPPED | OUTPATIENT
Start: 2025-01-17

## 2025-01-17 RX ORDER — BENZONATATE 100 MG/1
100 CAPSULE ORAL 3 TIMES DAILY PRN
Qty: 30 CAPSULE | Refills: 0 | Status: SHIPPED | OUTPATIENT
Start: 2025-01-17

## 2025-01-17 NOTE — PROGRESS NOTES
Acute Office Visit      Name: Heidi Youssef    : 1940     MRN: [id number]   Care Team: Patient Care Team:  Kyle Piedra MD as PCP - General  Kyle Piedra MD as PCP - Family Medicine  Kyle Piedra MD as Referring Physician (Internal Medicine)  Shabbir Askew MD as Consulting Physician (Ophthalmology)  Charanjit Rousseau MD as Consulting Physician (Orthopedic Surgery)  Aroldo Wiggins MD as Consulting Physician (Ophthalmology)  Haylie Harris APRN as Nurse Practitioner (Nurse Practitioner)  Kerry Caal, RN as Ambulatory  ([facility])    Chief Complaint  Nasal Congestion (Over a month)    Subjective     History of Present Illness:    Heidi is a pleasant 84-year-old female who comes to the office today with continued cold symptoms.    She reports that these symptoms have been going on for approximately 4 weeks.  She went to lunch with a friend, on .  The next day, both her and her friend had similar symptoms that she would describe as a head cold.  These include nasal drainage, a cough with yellow sputum, decreased appetite, sore throat, headache, and fatigue.    She denies chest pain or shortness of breath.  She denies fever or chills.  She denies ear fullness or pressure.    Heidi has not attempted any OTC medications other than occasional cough drops.    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    Ocular histoplasmosis     Osteoarthritis        Past Surgical History:   Procedure Laterality Date    CARPAL TUNNEL RELEASE  2015    Right    JOINT REPLACEMENT      rt knee partial replacement    REPLACEMENT TOTAL KNEE  2014    TONSILLECTOMY      TUBAL ABDOMINAL LIGATION Bilateral        Social History      Socioeconomic History    Marital status:    Tobacco Use    Smoking status: Never    Smokeless tobacco: Never   Vaping Use    Vaping status: Never Used   Substance and Sexual Activity    Alcohol use: Yes     Comment: occasional    Drug use: No    Sexual activity: Defer         Current Outpatient Medications:     acetaminophen (TYLENOL) 650 MG 8 hr tablet, Take 1 tablet by mouth Every 12 (Twelve) Hours As Needed for Moderate Pain., Disp: , Rfl:     Cyanocobalamin (VITAMIN B-12 PO), Take 1,000 mcg by mouth Daily. (Patient taking differently: Take 500 mg by mouth Daily.), Disp: , Rfl:     latanoprost (XALATAN) 0.005 % ophthalmic solution, Apply 1 drop to eye(s) as directed by provider Daily. Left eye (Patient taking differently: Administer 1 drop to both eyes Daily. Left eye), Disp: , Rfl:     Misc Natural Products (GLUCOSAMINE CHONDROITIN TRIPLE) tablet, Take 2 tablets by mouth Daily., Disp: , Rfl:     Multiple Vitamins-Minerals (PRESERVISION AREDS 2+MULTI VIT) capsule, Take 2 capsules by mouth Daily., Disp: , Rfl:     polyethyl glycol-propyl glycol (SYSTANE) 0.4-0.3 % solution ophthalmic solution (artificial tears), Administer 1 drop to both eyes 4 (Four) Times a Day., Disp: , Rfl:     azithromycin (Zithromax Z-Christ) 250 MG tablet, Take 2 tablets by mouth on day 1, then 1 tablet daily on days 2-5, Disp: 6 tablet, Rfl: 0    benzonatate (Tessalon Perles) 100 MG capsule, Take 1 capsule by mouth 3 (Three) Times a Day As Needed for Cough., Disp: 30 capsule, Rfl: 0    losartan (COZAAR) 25 MG tablet, TAKE 1 TABLET BY MOUTH DAILY (Patient not taking: Reported on 1/17/2025), Disp: 30 tablet, Rfl: 11    Current Facility-Administered Medications:     cyanocobalamin injection 1,000 mcg, 1,000 mcg, Intramuscular, Weekly, Kyle Piedra MD, 1,000 mcg at 06/13/23 1327    Procedures    PHQ-9 Total Score:      Objective     Vital Signs  /82 (BP Location: Left arm, Patient Position: Sitting, Cuff Size: Adult)    "Pulse 84   Temp 98 °F (36.7 °C) (Infrared)   Ht 160 cm (63\")   Wt 73.3 kg (161 lb 9.6 oz)   SpO2 98%   BMI 28.63 kg/m²   Estimated body mass index is 28.63 kg/m² as calculated from the following:    Height as of this encounter: 160 cm (63\").    Weight as of this encounter: 73.3 kg (161 lb 9.6 oz).    Physical Exam  Vitals and nursing note reviewed.   HENT:      Head: Normocephalic.      Right Ear: Ear canal and external ear normal. A middle ear effusion is present.      Left Ear: Ear canal and external ear normal. A middle ear effusion is present.      Nose: Congestion present.      Mouth/Throat:      Mouth: Mucous membranes are moist.      Pharynx: Posterior oropharyngeal erythema present.   Eyes:      Pupils: Pupils are equal, round, and reactive to light.   Cardiovascular:      Rate and Rhythm: Normal rate.   Pulmonary:      Effort: Pulmonary effort is normal.      Breath sounds: Normal breath sounds.   Skin:     General: Skin is warm and dry.   Neurological:      General: No focal deficit present.      Mental Status: She is alert and oriented to person, place, and time.   Psychiatric:         Mood and Affect: Mood normal.         Behavior: Behavior normal.         Thought Content: Thought content normal.         Judgment: Judgment normal.          Assessment and Plan     Assessment/Plan:  Diagnoses and all orders for this visit:    1. Acute non-recurrent maxillary sinusitis (Primary)  -     azithromycin (Zithromax Z-Christ) 250 MG tablet; Take 2 tablets by mouth on day 1, then 1 tablet daily on days 2-5  Dispense: 6 tablet; Refill: 0  -Start azithromycin as prescribed.  -May use benzonatate 100 mg 3 times daily as needed for cough.  -May continue OTC medications as needed for symptoms, pain, or discomfort.  -Push oral fluids to promote hydration.  -Encouraged to consume small, bland, frequent meals.  -If symptoms worsen or continue, return to office.    2. Subacute cough  -     benzonatate (Tessalon Perles) " 100 MG capsule; Take 1 capsule by mouth 3 (Three) Times a Day As Needed for Cough.  Dispense: 30 capsule; Refill: 0      There are no Patient Instructions on file for this visit.  Plan of care reviewed with patient at the conclusion of today's visit. Education was provided regarding diagnosis, management and any prescribed or recommended OTC medications.  Patient verbalizes understanding of and agreement with management plan.    Follow Up  Return for Next Scheduled Follow up. 11/2025 for continued management of chronic conditions.    Nidia Thomson, APRN

## 2025-01-29 ENCOUNTER — PATIENT OUTREACH (OUTPATIENT)
Dept: CASE MANAGEMENT | Facility: OTHER | Age: 85
End: 2025-01-29
Payer: MEDICARE

## 2025-01-29 DIAGNOSIS — R53.83 FATIGUE, UNSPECIFIED TYPE: ICD-10-CM

## 2025-01-29 DIAGNOSIS — I10 PRIMARY HYPERTENSION: Primary | ICD-10-CM

## 2025-01-29 DIAGNOSIS — M15.0 PRIMARY OSTEOARTHRITIS INVOLVING MULTIPLE JOINTS: ICD-10-CM

## 2025-01-29 DIAGNOSIS — I49.9 IRREGULAR HEARTBEAT: ICD-10-CM

## 2025-01-29 NOTE — OUTREACH NOTE
[facility] Interim Update    RN-ACM made outreach to patient. Patient was pleasant and appreciative of call. Patient stated that she is finally starting to feel better after being treated for a sinus infection approximately two weeks ago. Patient stated that she continues to have some drainage, but is mostly clear. 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 Then patient will see ENT on 3/4/25. ACM encourage patient to notify clinic if symptoms do not completely resolve, or worsen.    F/u outreach scheduled.    AMBULATORY CASE MANAGEMENT NOTE    Names and Relationships of Patient/Support Persons: Contact: Heidi Youssef; Relationship: Self -     ENT    Education Documentation  No documentation found.        Kerry CERNA  Ambulatory Case Management    1/29/2025, 15:46 EST

## 2025-01-29 NOTE — PLAN OF CARE
Problem: Hypertension  Goal: Make Healthy Diet Choices  Outcome: Progressing     Problem: Osteoarthritis  Goal: Manage Pain  Outcome: Progressing

## 2025-01-30 ENCOUNTER — PATIENT OUTREACH (OUTPATIENT)
Dept: CASE MANAGEMENT | Facility: OTHER | Age: 85
End: 2025-01-30
Payer: MEDICARE

## 2025-01-30 DIAGNOSIS — R53.83 FATIGUE, UNSPECIFIED TYPE: ICD-10-CM

## 2025-01-30 DIAGNOSIS — I49.9 IRREGULAR HEARTBEAT: ICD-10-CM

## 2025-01-30 DIAGNOSIS — I10 PRIMARY HYPERTENSION: Primary | ICD-10-CM

## 2025-01-30 DIAGNOSIS — M15.0 PRIMARY OSTEOARTHRITIS INVOLVING MULTIPLE JOINTS: ICD-10-CM

## 2025-01-30 NOTE — OUTREACH NOTE
[facility] End of Month Documentation    This Chronic Medical Management Care Plan for Heidi Youssef, 84 y.o. female, has been monitored and managed; reviewed and a new plan of care implemented for the month of January.  A cumulative time of 27  minutes was spent on this patient record this month, including electronic communication with primary care provider; phone call with patient.    Regarding the patient's problems: has Primary hypertension; Paresthesias in right hand; Paresthesias in left hand; Mixed hyperlipidemia; Sebaceous cyst; Osteoarthritis; Obesity; Annual physical exam; Chronic bilateral low back pain with right-sided sciatica; Neck pain; Fatigue; Medicare annual wellness visit, subsequent; Irregular heartbeat; Vitamin D deficiency; and PAC (premature atrial contraction) on their problem list., the following items were addressed: medications and any changes can be found within the plan section of the note.  A detailed listing of time spent for chronic care management is tracked within each outreach encounter.  Current medications include:  has a current medication list which includes the following prescription(s): acetaminophen, azithromycin, benzonatate, cyanocobalamin, latanoprost, losartan, glucosamine chondroitin triple, preservision areds 2+multi vit, and polyethyl glycol-propyl glycol, and the following Facility-Administered Medications: cyanocobalamin. and the patient is reported to be patient is compliant with medication protocol,  Medications are reported to be effective.  Regarding these diagnoses, referrals were made to the following provider(s):  .  All notes on chart for PCP to review.    The patient was monitored remotely for blood pressure; activity level.    The patient's physical needs include:  hearing care.     The patient's mental support needs include:  continued support    The patient's cognitive support needs include:  continued support; medication    The patient's psychosocial support  needs include:  continued support    The patient's functional needs include: medication education    The patient's environmental needs include:  not applicable    Care Plan overall comments:  No data recorded    Refer to previous outreach notes for more information on the areas listed above.    Monthly Billing Diagnoses  (I10) Primary hypertension    (M15.0) Primary osteoarthritis involving multiple joints    (I49.9) Irregular heartbeat    (R53.83) Fatigue, unspecified type    Medications   Medications have been reconciled    Care Plan progress this month:      Recently Modified Care Plans Updates made since 12/30/2024 12:00 AM      No recently modified care plans.             Hypertension       Make Healthy Diet Choices (Progressing)       Start:  12/19/24    Expected End:  02/19/26         My Healthy Diet To Do List       Start:  12/19/24         Why is this important?  Taking small steps to change how you eat is a good place to start.             Osteoarthritis       Manage Pain (Progressing)       Start:  12/19/24    Expected End:  02/19/26         My Pain Management To Do List       Start:  12/19/24         Why is this important?  Day-to-day life can be hard when you have joint pain.  Pain medicine is just one piece of the treatment puzzle. There are many things you can do to manage pain and stay strong.  Lifestyle changes like stopping smoking and eating foods with Vitamin D and calcium keeps your bones and muscles healthy. Your joints are better when supported by strong muscles.  You can try these action steps to help you manage your pain.               Current Specialty Plan of Care Status signed by both patient and provider    Instructions   Patient was provided an electronic copy of care plan  CCM services were explained and offered and patient has accepted these services.  Patient has given their written consent to receive CCM services and understands that this includes the authorization of electronic  communication of medical information with the other treating providers.  Patient understands that they may stop CCM services at any time and these changes will be effective at the end of the calendar month and will effectively revocate the agreement of CCM services.  Patient understands that only one practitioner can furnish and be paid for CCM services during one calendar month.  Patient also understands that there may be co-payment or deductible fees in association with CCM services.  Patient will continue with at least monthly follow-up calls with the Ambulatory .    Kerry CERNA  Ambulatory Case Management    1/30/2025, 16:12 EST

## 2025-02-12 ENCOUNTER — OFFICE VISIT (OUTPATIENT)
Dept: INTERNAL MEDICINE | Facility: CLINIC | Age: 85
End: 2025-02-12
Payer: MEDICARE

## 2025-02-12 VITALS
BODY MASS INDEX: 28.7 KG/M2 | DIASTOLIC BLOOD PRESSURE: 90 MMHG | HEART RATE: 80 BPM | WEIGHT: 162 LBS | TEMPERATURE: 98 F | HEIGHT: 63 IN | OXYGEN SATURATION: 98 % | SYSTOLIC BLOOD PRESSURE: 140 MMHG

## 2025-02-12 DIAGNOSIS — R09.81 NASAL CONGESTION: ICD-10-CM

## 2025-02-12 DIAGNOSIS — R05.1 ACUTE COUGH: ICD-10-CM

## 2025-02-12 DIAGNOSIS — R05.1 ACUTE COUGH: Primary | ICD-10-CM

## 2025-02-12 LAB
EXPIRATION DATE: NORMAL
FLUAV AG UPPER RESP QL IA.RAPID: NOT DETECTED
FLUBV AG UPPER RESP QL IA.RAPID: NOT DETECTED
INTERNAL CONTROL: NORMAL
Lab: NORMAL
SARS-COV-2 AG UPPER RESP QL IA.RAPID: NOT DETECTED

## 2025-02-12 PROCEDURE — 3080F DIAST BP >= 90 MM HG: CPT | Performed by: STUDENT IN AN ORGANIZED HEALTH CARE EDUCATION/TRAINING PROGRAM

## 2025-02-12 PROCEDURE — 99213 OFFICE O/P EST LOW 20 MIN: CPT | Performed by: STUDENT IN AN ORGANIZED HEALTH CARE EDUCATION/TRAINING PROGRAM

## 2025-02-12 PROCEDURE — 1159F MED LIST DOCD IN RCRD: CPT | Performed by: STUDENT IN AN ORGANIZED HEALTH CARE EDUCATION/TRAINING PROGRAM

## 2025-02-12 PROCEDURE — 1126F AMNT PAIN NOTED NONE PRSNT: CPT | Performed by: STUDENT IN AN ORGANIZED HEALTH CARE EDUCATION/TRAINING PROGRAM

## 2025-02-12 PROCEDURE — 3077F SYST BP >= 140 MM HG: CPT | Performed by: STUDENT IN AN ORGANIZED HEALTH CARE EDUCATION/TRAINING PROGRAM

## 2025-02-12 PROCEDURE — 87428 SARSCOV & INF VIR A&B AG IA: CPT | Performed by: STUDENT IN AN ORGANIZED HEALTH CARE EDUCATION/TRAINING PROGRAM

## 2025-02-12 PROCEDURE — 1160F RVW MEDS BY RX/DR IN RCRD: CPT | Performed by: STUDENT IN AN ORGANIZED HEALTH CARE EDUCATION/TRAINING PROGRAM

## 2025-02-12 RX ORDER — FLUTICASONE PROPIONATE 50 MCG
2 SPRAY, SUSPENSION (ML) NASAL DAILY
Qty: 18.2 G | Refills: 0 | Status: SHIPPED | OUTPATIENT
Start: 2025-02-12

## 2025-02-12 RX ORDER — CETIRIZINE HYDROCHLORIDE 5 MG/1
5 TABLET ORAL DAILY
Qty: 30 TABLET | Refills: 0 | Status: SHIPPED | OUTPATIENT
Start: 2025-02-12 | End: 2025-03-14

## 2025-02-12 RX ORDER — FLUTICASONE PROPIONATE 50 MCG
SPRAY, SUSPENSION (ML) NASAL
Qty: 48 G | OUTPATIENT
Start: 2025-02-12

## 2025-02-12 NOTE — PROGRESS NOTES
Office Note     Name: Heidi Youssef    : 1940     MRN: [id number]     Chief Complaint  Cough and Nasal Congestion    Subjective     History of Present Illness:  Heidi Youssef is a 84 y.o. female who presents today for cough and nasal congestion.      History of Present Illness  The patient is presenting today with nasal congestion and cough.    Her symptoms began in 2024, initially presenting as a mild cough without fever. Despite her friend's recovery from a similar illness, her condition persisted. She sought medical attention from Nidia, who diagnosed her with a sinus infection and prescribed a Z-Christ. Last week, she experienced a period of relief with only occasional coughing. However, her symptoms returned last night, characterized by severe coughing that disrupted her sleep. Initially, her primary symptom was nasal congestion. Currently, she reports postnasal drainage and a persistent cough. She has not experienced any fevers or chills. She has a history of allergies but has never taken Zyrtec or Claritin. She reports feeling unwell, describing it as a sensation of pressure rather than a headache. She elevates her bed at night.    Review of Systems:   Review of Systems   Constitutional:  Negative for chills and fever.   HENT:  Positive for congestion and postnasal drip.    Respiratory:  Positive for cough. Negative for shortness of breath.        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    Ocular histoplasmosis     Osteoarthritis        Past Surgical History:   Past Surgical History:   Procedure Laterality Date    CARPAL TUNNEL RELEASE  2015    Right    JOINT REPLACEMENT      rt knee partial replacement    REPLACEMENT TOTAL KNEE  2014    TONSILLECTOMY       TUBAL ABDOMINAL LIGATION Bilateral 1975       Family History:   Family History   Problem Relation Age of Onset    Breast cancer Maternal Aunt 45    Arthritis Mother         OA    Hypertension Mother     Diabetes Mother     Heart attack Mother 75    Coronary artery disease Father 49    Diabetes Maternal Uncle     Atrial fibrillation Brother         Late 60s s/p ablation       Social History:   Social History     Socioeconomic History    Marital status:    Tobacco Use    Smoking status: Never    Smokeless tobacco: Never   Vaping Use    Vaping status: Never Used   Substance and Sexual Activity    Alcohol use: Yes     Comment: occasional    Drug use: No    Sexual activity: Defer       Immunizations:   Immunization History   Administered Date(s) Administered    COVID-19 (PFIZER) 12YRS+ (COMIRNATY) 11/29/2023, 11/19/2024    COVID-19 (PFIZER) BIVALENT 12+YRS 11/08/2022    COVID-19 (PFIZER) Purple Cap Monovalent 02/15/2021, 03/09/2021, 10/06/2021    Covid-19 (Pfizer) Gray Cap Monovalent 05/15/2022    FLUAD TRI 65YR+ 09/30/2019    Fluad Quad 65+ 09/10/2020    Fluzone High-Dose 65+YRS 09/25/2014, 11/13/2015, 11/15/2016, 11/02/2017, 10/24/2018, 10/18/2024    Fluzone High-Dose 65+yrs 09/27/2021, 10/13/2022, 10/31/2023    HPV Bivalent 11/05/2008    INFLUENZA SPLIT TRI 10/25/2012, 10/28/2013    Influenza TIV (IM) 11/05/2008, 10/18/2011    Pneumococcal Conjugate 13-Valent (PCV13) 05/11/2016    Pneumococcal Polysaccharide (PPSV23) 05/11/2017    Tdap 10/25/2012        Medications:     Current Outpatient Medications:     acetaminophen (TYLENOL) 650 MG 8 hr tablet, Take 1 tablet by mouth Every 12 (Twelve) Hours As Needed for Moderate Pain., Disp: , Rfl:     Cyanocobalamin (VITAMIN B-12 PO), Take 1,000 mcg by mouth Daily. (Patient taking differently: Take 500 mg by mouth Daily.), Disp: , Rfl:     latanoprost (XALATAN) 0.005 % ophthalmic solution, Apply 1 drop to eye(s) as directed by provider Daily. Left eye (Patient taking  "differently: Administer 1 drop to both eyes Daily. Left eye), Disp: , Rfl:     losartan (COZAAR) 25 MG tablet, TAKE 1 TABLET BY MOUTH DAILY, Disp: 30 tablet, Rfl: 11    Misc Natural Products (GLUCOSAMINE CHONDROITIN TRIPLE) tablet, Take 2 tablets by mouth Daily., Disp: , Rfl:     Multiple Vitamins-Minerals (PRESERVISION AREDS 2+MULTI VIT) capsule, Take 2 capsules by mouth Daily., Disp: , Rfl:     polyethyl glycol-propyl glycol (SYSTANE) 0.4-0.3 % solution ophthalmic solution (artificial tears), Administer 1 drop to both eyes 4 (Four) Times a Day., Disp: , Rfl:     benzonatate (Tessalon Perles) 100 MG capsule, Take 1 capsule by mouth 3 (Three) Times a Day As Needed for Cough. (Patient not taking: Reported on 2/12/2025), Disp: 30 capsule, Rfl: 0    cetirizine (zyrTEC) 5 MG tablet, Take 1 tablet by mouth Daily for 30 days., Disp: 30 tablet, Rfl: 0    fluticasone (FLONASE) 50 MCG/ACT nasal spray, Administer 2 sprays into the nostril(s) as directed by provider Daily., Disp: 18.2 g, Rfl: 0    Current Facility-Administered Medications:     cyanocobalamin injection 1,000 mcg, 1,000 mcg, Intramuscular, Weekly, Kyle Piedra MD, 1,000 mcg at 06/13/23 1327    Allergies:   Allergies   Allergen Reactions    Morphine Nausea Only    Aspirin Palpitations, Other (See Comments) and Unknown - Low Severity     causes tachycardia    Caffeine Other (See Comments)     tachycardia    Chocolate Other (See Comments)     tachycardia    Ibuprofen Unknown - Low Severity    Naproxen Unknown - Low Severity    Nsaids Palpitations and Other (See Comments)     tachycardia    Sulfacetamide Unknown - Low Severity    Sulfamethoxazole-Trimethoprim Other (See Comments)     Severe joint pain  muscle weakness  Severe joint pain       Objective     Vital Signs  /90 (BP Location: Left arm, Patient Position: Sitting, Cuff Size: Adult)   Pulse 80   Temp 98 °F (36.7 °C) (Infrared)   Ht 160 cm (62.99\")   Wt 73.5 kg (162 lb)   SpO2 98%   BMI " 28.70 kg/m²   Body mass index is 28.7 kg/m².            Physical Exam  Constitutional:       Appearance: Normal appearance.   HENT:      Head: Normocephalic and atraumatic.   Eyes:      Extraocular Movements: Extraocular movements intact.      Conjunctiva/sclera: Conjunctivae normal.   Pulmonary:      Effort: Pulmonary effort is normal. No respiratory distress.   Neurological:      General: No focal deficit present.      Mental Status: She is alert and oriented to person, place, and time.   Psychiatric:         Mood and Affect: Mood normal.         Behavior: Behavior normal.            Results:  Recent Results (from the past 24 hours)   POCT SARS-CoV-2 + Flu Antigen NORM    Collection Time: 02/12/25 11:05 AM    Specimen: Swab   Result Value Ref Range    SARS Antigen Not Detected Not Detected, Presumptive Negative    Influenza A Antigen NORM Not Detected Not Detected    Influenza B Antigen NORM Not Detected Not Detected    Internal Control Passed Passed    Lot Number [id number]     Expiration Date 11,[id number]         Assessment and Plan     Assessment/Plan:  Diagnoses and all orders for this visit:    1. Acute cough (Primary)  -Differential diagnosis includes postinfectious cough, viral illness, postnasal drip secondary to allergic rhinitis.  At this time given her lack of fevers I do not believe additional antibiotics are indicated.  -I would like to start a trial of treatment for possible allergic rhinitis contributing to her symptoms with daily Zyrtec and Flonase nasal spray.  -Encouraged trial of 1 to 2 weeks, she may present for reevaluation if her symptoms are not improving.  -     POCT SARS-CoV-2 + Flu Antigen NORM  -     cetirizine (zyrTEC) 5 MG tablet; Take 1 tablet by mouth Daily for 30 days.  Dispense: 30 tablet; Refill: 0  -     fluticasone (FLONASE) 50 MCG/ACT nasal spray; Administer 2 sprays into the nostril(s) as directed by provider Daily.  Dispense: 18.2 g; Refill: 0    2. Nasal congestion  -See  above      Follow Up  No follow-ups on file.    Patient or patient representative verbalized consent for the use of Ambient Listening during the visit with  Deb Lacy MD for chart documentation. 2/12/2025  12:30 BASIA Lacy MD   [facility] [place] [id number]

## 2025-02-13 RX ORDER — AMOXICILLIN 500 MG/1
500 CAPSULE ORAL 2 TIMES DAILY
Qty: 14 CAPSULE | Refills: 0 | Status: SHIPPED | OUTPATIENT
Start: 2025-02-13 | End: 2025-02-14

## 2025-02-14 ENCOUNTER — TELEPHONE (OUTPATIENT)
Dept: INTERNAL MEDICINE | Facility: CLINIC | Age: 85
End: 2025-02-14
Payer: MEDICARE

## 2025-02-14 RX ORDER — AZITHROMYCIN 250 MG/1
TABLET, FILM COATED ORAL
Qty: 6 TABLET | Refills: 0 | Status: SHIPPED | OUTPATIENT
Start: 2025-02-14

## 2025-02-14 NOTE — TELEPHONE ENCOUNTER
I talked with patient's daughter, Stella.  Patient's blood pressure is now recovered and is 130 systolic.  She did note that after starting amoxicillin she has had some diarrhea this morning.  The episode certainly sounds like a vagal event and may have been triggered by GI side effects from amoxicillin.  At present it appears that the hypotension has resolved there is family there to keep an eye on her.  They know to make sure she is eating and drinking properly.  Will go ahead and discontinue amoxicillin and give a course of azithromycin to minimize GI side effects

## 2025-02-14 NOTE — TELEPHONE ENCOUNTER
Patient's daughter Stella  called to say that mom had called her to tell her that she was in the laundry room hang a pair of pants when she felt nauseous and passed out. Says mom had woke up and was not able to stand up but crawled into the bedroom and called her . Says she does not think mom hit her head, no sign of bruising.  Says when she got to the house , mom had cold sweat on the back of her neck and her BP was 81/41. Says this has happened to mom before a couple of years ago.    Says mom denies having headaches , nausea, dizziness,or blurry vision.  Says she took moms BP again and it went up to 103/58. Says mom does not want to come in or go to the ER, Urgent Care, but rather for Dr Piedra to give the daughter a call please at [phone]

## 2025-03-03 ENCOUNTER — TELEPHONE (OUTPATIENT)
Dept: CASE MANAGEMENT | Facility: OTHER | Age: 85
End: 2025-03-03
Payer: MEDICARE

## 2025-03-05 ENCOUNTER — TELEPHONE (OUTPATIENT)
Dept: CASE MANAGEMENT | Facility: OTHER | Age: 85
End: 2025-03-05
Payer: MEDICARE

## 2025-03-05 ENCOUNTER — PATIENT OUTREACH (OUTPATIENT)
Dept: CASE MANAGEMENT | Facility: OTHER | Age: 85
End: 2025-03-05
Payer: MEDICARE

## 2025-03-05 DIAGNOSIS — I10 PRIMARY HYPERTENSION: Primary | ICD-10-CM

## 2025-03-05 DIAGNOSIS — M15.0 PRIMARY OSTEOARTHRITIS INVOLVING MULTIPLE JOINTS: ICD-10-CM

## 2025-03-05 DIAGNOSIS — R53.83 FATIGUE, UNSPECIFIED TYPE: ICD-10-CM

## 2025-03-05 NOTE — PLAN OF CARE
Problem: Hypertension  Goal: Make Healthy Diet Choices  Outcome: Met     Problem: Hypertension  Goal: Make Healthy Diet Choices  Intervention: My Healthy Diet To Do List  Description:   Why is this important?  Taking small steps to change how you eat is a good place to start.       Problem: Osteoarthritis  Goal: Manage Pain  Outcome: Met     Problem: Osteoarthritis  Goal: Manage Pain  Intervention: My Pain Management To Do List  Description:   Why is this important?  Day-to-day life can be hard when you have joint pain.  Pain medicine is just one piece of the treatment puzzle. There are many things you can do to manage pain and stay strong.  Lifestyle changes like stopping smoking and eating foods with Vitamin D and calcium keeps your bones and muscles healthy. Your joints are better when supported by strong muscles.  You can try these action steps to help you manage your pain.

## 2025-03-05 NOTE — OUTREACH NOTE
[facility] Interim Update    RN-ACM spoke to patient via incoming call. Patient was pleasant and appreciative of support. 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 Patient is doing well.     AMBULATORY CASE MANAGEMENT NOTE    Names and Relationships of Patient/Support Persons:  -         Education Documentation  No documentation found.        Kerry CERNA  Ambulatory Case Management    3/5/2025, 16:11 EST

## 2025-04-03 ENCOUNTER — TELEPHONE (OUTPATIENT)
Dept: CASE MANAGEMENT | Facility: OTHER | Age: 85
End: 2025-04-03
Payer: MEDICARE

## 2025-04-03 NOTE — TELEPHONE ENCOUNTER
30 day chart review completed. No changes in patients POC since last RN-ACM outreach. RN-ACM will continue monitoring at this time.

## 2025-05-08 ENCOUNTER — TELEPHONE (OUTPATIENT)
Dept: CASE MANAGEMENT | Facility: OTHER | Age: 85
End: 2025-05-08
Payer: MEDICARE

## 2025-05-08 NOTE — TELEPHONE ENCOUNTER
60 day chart review completed. No changes in patients POC since last RN-ACM outreach. RN-ACM will continue monitoring at this time.

## 2025-06-02 ENCOUNTER — OFFICE VISIT (OUTPATIENT)
Dept: INTERNAL MEDICINE | Facility: CLINIC | Age: 85
End: 2025-06-02
Payer: MEDICARE

## 2025-06-02 VITALS
TEMPERATURE: 98 F | WEIGHT: 162.7 LBS | SYSTOLIC BLOOD PRESSURE: 154 MMHG | HEART RATE: 71 BPM | DIASTOLIC BLOOD PRESSURE: 64 MMHG | HEIGHT: 63 IN | BODY MASS INDEX: 28.83 KG/M2 | OXYGEN SATURATION: 97 %

## 2025-06-02 DIAGNOSIS — H02.843 PAIN AND SWELLING OF EYELID OF RIGHT EYE: ICD-10-CM

## 2025-06-02 DIAGNOSIS — H02.89 PAIN AND SWELLING OF EYELID OF RIGHT EYE: ICD-10-CM

## 2025-06-02 DIAGNOSIS — S00.11XA BLACK EYE OF RIGHT SIDE, INITIAL ENCOUNTER: Primary | ICD-10-CM

## 2025-06-02 DIAGNOSIS — S01.111A LACERATION OF RIGHT EYEBROW WITHOUT COMPLICATION, INITIAL ENCOUNTER: ICD-10-CM

## 2025-06-02 PROCEDURE — 1160F RVW MEDS BY RX/DR IN RCRD: CPT

## 2025-06-02 PROCEDURE — 1126F AMNT PAIN NOTED NONE PRSNT: CPT

## 2025-06-02 PROCEDURE — 1159F MED LIST DOCD IN RCRD: CPT

## 2025-06-02 PROCEDURE — 99213 OFFICE O/P EST LOW 20 MIN: CPT

## 2025-06-02 PROCEDURE — 3078F DIAST BP <80 MM HG: CPT

## 2025-06-02 PROCEDURE — 3077F SYST BP >= 140 MM HG: CPT

## 2025-06-02 NOTE — PROGRESS NOTES
Acute Office Visit      Date: 2025   Patient Name: Heidi Youssef  : 1940   MRN: [id number]     Chief Complaint:    Chief Complaint   Patient presents with    Head Injury    Black Eye       History of Present Illness: Heidi Youssef is a 84 y.o. female who presented after a fall that occurred yesterday at approximately 3 PM in [place]. She reports that she was carrying a pizza box and missed a curb, resulting in a fall. She denies any loss of consciousness at the time of the incident. Immediately following the fall, she applied ice packs intermittently during her ride home.    The patient sustained a laceration on her eyebrow, which bled minimally for about 10-15 minutes. She reports localized pain above the eye at the site of the laceration but denies any headaches, dizziness, nausea, or vomiting since the fall. She also denies any changes in vision, weakness, or numbness in her limbs, and has not experienced any difficulty with speech or understanding.    In terms of other injuries, she mentions slight scrapes on her knees, which are not painful. She denies any previous falls and reports a history of occasional balance issues due to a diagnosed Vestibular Schwannoma, which has remained stable for the past year and a half with no current plans for surgical intervention. She is not on any medications, only taking vitamins.    The patient reports no changes in her ability to perform daily activities and is able to walk and move around as usual.    The patient presents to the clinic with an elevated systolic BP, she reports that she checked her BP this morning at home and her systolic was 126.  Subjective      I have reviewed the patients family history, social history, past medical history, past surgical history and have updated it as appropriate.     Medications:     Current Outpatient Medications:     acetaminophen (TYLENOL) 650 MG 8 hr tablet, Take 1 tablet by mouth Every 12 (Twelve) Hours As  Needed for Moderate Pain., Disp: , Rfl:     benzonatate (Tessalon Perles) 100 MG capsule, Take 1 capsule by mouth 3 (Three) Times a Day As Needed for Cough., Disp: 30 capsule, Rfl: 0    Cyanocobalamin (VITAMIN B-12 PO), Take 1,000 mcg by mouth Daily. (Patient taking differently: Take 500 mg by mouth Daily.), Disp: , Rfl:     fluticasone (FLONASE) 50 MCG/ACT nasal spray, Administer 2 sprays into the nostril(s) as directed by provider Daily., Disp: 18.2 g, Rfl: 0    latanoprost (XALATAN) 0.005 % ophthalmic solution, Apply 1 drop to eye(s) as directed by provider Daily. Left eye (Patient taking differently: Administer 1 drop to both eyes Daily. Left eye), Disp: , Rfl:     Misc Natural Products (GLUCOSAMINE CHONDROITIN TRIPLE) tablet, Take 2 tablets by mouth Daily., Disp: , Rfl:     Multiple Vitamins-Minerals (PRESERVISION AREDS 2+MULTI VIT) capsule, Take 2 capsules by mouth Daily., Disp: , Rfl:     polyethyl glycol-propyl glycol (SYSTANE) 0.4-0.3 % solution ophthalmic solution (artificial tears), Administer 1 drop to both eyes 4 (Four) Times a Day., Disp: , Rfl:     cetirizine (zyrTEC) 5 MG tablet, Take 1 tablet by mouth Daily for 30 days., Disp: 30 tablet, Rfl: 0    Current Facility-Administered Medications:     cyanocobalamin injection 1,000 mcg, 1,000 mcg, Intramuscular, Weekly, Kyle Piedra MD, 1,000 mcg at 06/13/23 1327    Allergies:   Allergies   Allergen Reactions    Morphine Nausea Only    Aspirin Palpitations, Other (See Comments) and Unknown - Low Severity     causes tachycardia    Caffeine Other (See Comments)     tachycardia    Chocolate Other (See Comments)     tachycardia    Ibuprofen Unknown - Low Severity    Naproxen Unknown - Low Severity    Nsaids Palpitations and Other (See Comments)     tachycardia    Sulfacetamide Unknown - Low Severity    Sulfamethoxazole-Trimethoprim Other (See Comments)     Severe joint pain  muscle weakness  Severe joint pain       Objective     Physical Exam: Please  "see above  Vital Signs:   Vitals:    06/02/25 1011   BP: 154/64   BP Location: Left arm   Patient Position: Lying   Cuff Size: Adult   Pulse: 71   Temp: 98 °F (36.7 °C)   TempSrc: Infrared   SpO2: 97%   Weight: 73.8 kg (162 lb 11.2 oz)   Height: 160 cm (62.99\")   PainSc: 0-No pain     Body mass index is 28.83 kg/m².    Physical Exam  HENT:      Head: Contusion, right periorbital erythema and laceration present.                 Assessment / Plan      Assessment/Plan:   Problem List Items Addressed This Visit    None  Visit Diagnoses         Black eye of right side, initial encounter    -  Primary      Pain and swelling of eyelid of right eye          Laceration of right eyebrow without complication, initial encounter                - Instructed patient to monitor for symptoms concerning for head bleed such as headache, nausea or vomiting, confusion or altered mental status, increased dizziness or loss of balance, changes in vision, difficulty speaking or understanding speech, new weakness or numbness in any part of the body.  If any of these develop she should seek medical attention at the emergency department immediately.    -Instructed patient to clean the laceration with soap and water and to keep the area dry to promote healing.    -Educated the patient on signs of infection such as increased redness or swelling around the wound, warmth or tenderness at the site, and pus.  Encouraged her to reach out to the clinic if she notices any signs of infection.    -Instructed the patient to monitor her blood pressure at home regularly as it was slightly elevated during today's visit.      Follow Up:   Return if symptoms worsen or fail to improve.    DELORES Mccullough [facility] [address]  [facility]  [address] [facility] [address] [place] [phone]  Fax [phone]  Phone [phone]  "

## 2025-06-05 ENCOUNTER — TELEPHONE (OUTPATIENT)
Dept: CASE MANAGEMENT | Facility: OTHER | Age: 85
End: 2025-06-05
Payer: MEDICARE

## 2025-06-05 NOTE — TELEPHONE ENCOUNTER
90 day chart review completed. No changes in patients POC since last RN-ACM outreach. RN-ACM will continue monitoring at this time.

## 2025-07-03 ENCOUNTER — TELEPHONE (OUTPATIENT)
Dept: CASE MANAGEMENT | Facility: OTHER | Age: 85
End: 2025-07-03
Payer: MEDICARE

## 2025-07-03 NOTE — TELEPHONE ENCOUNTER
120 day chart review completed. No changes in patients POC since last RN-ACM outreach. RN-ACM will continue monitoring at this time.

## 2025-08-19 RX ORDER — METHYLPREDNISOLONE 4 MG/1
TABLET ORAL
Qty: 21 TABLET | Refills: 0 | Status: SHIPPED | OUTPATIENT
Start: 2025-08-19

## 2025-08-27 DIAGNOSIS — H53.2 DIPLOPIA: Primary | ICD-10-CM

## 2025-08-28 ENCOUNTER — LAB (OUTPATIENT)
Dept: LAB | Facility: HOSPITAL | Age: 85
End: 2025-08-28
Payer: MEDICARE

## 2025-08-28 DIAGNOSIS — H53.2 DIPLOPIA: ICD-10-CM

## 2025-08-28 LAB — CRP SERPL-MCNC: <0.3 MG/DL (ref 0–0.5)

## 2025-08-28 PROCEDURE — 86140 C-REACTIVE PROTEIN: CPT

## 2025-08-28 PROCEDURE — 85652 RBC SED RATE AUTOMATED: CPT

## 2025-08-29 LAB — ERYTHROCYTE [SEDIMENTATION RATE] IN BLOOD: 24 MM/HR (ref 0–30)
